# Patient Record
Sex: MALE | Race: WHITE | Employment: OTHER | URBAN - METROPOLITAN AREA
[De-identification: names, ages, dates, MRNs, and addresses within clinical notes are randomized per-mention and may not be internally consistent; named-entity substitution may affect disease eponyms.]

---

## 2017-11-14 RX ORDER — HYDROXYZINE HYDROCHLORIDE 10 MG/1
10 TABLET, FILM COATED ORAL
COMMUNITY
End: 2018-03-13

## 2017-11-15 ENCOUNTER — HOSPITAL ENCOUNTER (OUTPATIENT)
Dept: CARDIAC CATH/INVASIVE PROCEDURES | Age: 82
Discharge: HOME | End: 2017-11-15
Attending: INTERNAL MEDICINE | Admitting: INTERNAL MEDICINE
Payer: MEDICARE

## 2017-11-15 VITALS
RESPIRATION RATE: 24 BRPM | SYSTOLIC BLOOD PRESSURE: 148 MMHG | DIASTOLIC BLOOD PRESSURE: 74 MMHG | WEIGHT: 252.06 LBS | HEART RATE: 69 BPM | HEIGHT: 71 IN | TEMPERATURE: 98.5 F | BODY MASS INDEX: 35.29 KG/M2 | OXYGEN SATURATION: 94 %

## 2017-11-15 PROCEDURE — 74011000250 HC RX REV CODE- 250

## 2017-11-15 PROCEDURE — 74011250636 HC RX REV CODE- 250/636

## 2017-11-15 PROCEDURE — 74011250636 HC RX REV CODE- 250/636: Performed by: INTERNAL MEDICINE

## 2017-11-15 PROCEDURE — 77030013797 CARDIAC CATHETERIZATION

## 2017-11-15 PROCEDURE — 74011636320 HC RX REV CODE- 636/320: Performed by: INTERNAL MEDICINE

## 2017-11-15 RX ORDER — METOPROLOL SUCCINATE 25 MG/1
50 TABLET, EXTENDED RELEASE ORAL DAILY
Status: DISCONTINUED | OUTPATIENT
Start: 2017-11-15 | End: 2017-11-15 | Stop reason: HOSPADM

## 2017-11-15 RX ORDER — METOPROLOL SUCCINATE 50 MG/1
50 TABLET, EXTENDED RELEASE ORAL DAILY
Qty: 30 TAB | Refills: 6 | Status: SHIPPED | OUTPATIENT
Start: 2017-11-15 | End: 2018-03-13

## 2017-11-15 RX ORDER — MIDAZOLAM HYDROCHLORIDE 1 MG/ML
.5-2 INJECTION, SOLUTION INTRAMUSCULAR; INTRAVENOUS
Status: DISCONTINUED | OUTPATIENT
Start: 2017-11-15 | End: 2017-11-15 | Stop reason: HOSPADM

## 2017-11-15 RX ORDER — NITROGLYCERIN 0.4 MG/1
0.4 TABLET SUBLINGUAL
Status: DISCONTINUED | OUTPATIENT
Start: 2017-11-15 | End: 2017-11-15 | Stop reason: HOSPADM

## 2017-11-15 RX ORDER — SODIUM CHLORIDE 0.9 % (FLUSH) 0.9 %
5-10 SYRINGE (ML) INJECTION AS NEEDED
Status: DISCONTINUED | OUTPATIENT
Start: 2017-11-15 | End: 2017-11-15 | Stop reason: HOSPADM

## 2017-11-15 RX ORDER — SIMVASTATIN 20 MG/1
20 TABLET, FILM COATED ORAL
Status: DISCONTINUED | OUTPATIENT
Start: 2017-11-15 | End: 2017-11-15 | Stop reason: HOSPADM

## 2017-11-15 RX ORDER — MIDAZOLAM HYDROCHLORIDE 1 MG/ML
INJECTION, SOLUTION INTRAMUSCULAR; INTRAVENOUS
Status: COMPLETED
Start: 2017-11-15 | End: 2017-11-15

## 2017-11-15 RX ORDER — SOLIFENACIN SUCCINATE 5 MG/1
10 TABLET, FILM COATED ORAL DAILY
Status: DISCONTINUED | OUTPATIENT
Start: 2017-11-16 | End: 2017-11-15 | Stop reason: HOSPADM

## 2017-11-15 RX ORDER — LIDOCAINE HYDROCHLORIDE 10 MG/ML
INJECTION INFILTRATION; PERINEURAL
Status: COMPLETED
Start: 2017-11-15 | End: 2017-11-15

## 2017-11-15 RX ORDER — IBUPROFEN 200 MG
400 TABLET ORAL
Status: ON HOLD | COMMUNITY
End: 2018-01-10

## 2017-11-15 RX ORDER — FENTANYL CITRATE 50 UG/ML
INJECTION, SOLUTION INTRAMUSCULAR; INTRAVENOUS
Status: COMPLETED
Start: 2017-11-15 | End: 2017-11-15

## 2017-11-15 RX ORDER — FENTANYL CITRATE 50 UG/ML
25-100 INJECTION, SOLUTION INTRAMUSCULAR; INTRAVENOUS
Status: DISCONTINUED | OUTPATIENT
Start: 2017-11-15 | End: 2017-11-15 | Stop reason: HOSPADM

## 2017-11-15 RX ORDER — HYDROXYZINE HYDROCHLORIDE 10 MG/1
10 TABLET, FILM COATED ORAL
Status: DISCONTINUED | OUTPATIENT
Start: 2017-11-15 | End: 2017-11-15 | Stop reason: HOSPADM

## 2017-11-15 RX ORDER — HEPARIN SODIUM 200 [USP'U]/100ML
500 INJECTION, SOLUTION INTRAVENOUS
Status: DISCONTINUED | OUTPATIENT
Start: 2017-11-15 | End: 2017-11-15 | Stop reason: HOSPADM

## 2017-11-15 RX ORDER — HEPARIN SODIUM 200 [USP'U]/100ML
INJECTION, SOLUTION INTRAVENOUS
Status: COMPLETED
Start: 2017-11-15 | End: 2017-11-15

## 2017-11-15 RX ORDER — IBUPROFEN 400 MG/1
400 TABLET ORAL
Status: DISCONTINUED | OUTPATIENT
Start: 2017-11-15 | End: 2017-11-15 | Stop reason: HOSPADM

## 2017-11-15 RX ORDER — TELMISARTAN 40 MG/1
20 TABLET ORAL DAILY
Status: DISCONTINUED | OUTPATIENT
Start: 2017-11-16 | End: 2017-11-15 | Stop reason: HOSPADM

## 2017-11-15 RX ORDER — METOPROLOL SUCCINATE 50 MG/1
50 TABLET, EXTENDED RELEASE ORAL DAILY
Qty: 30 TAB | Refills: 6 | Status: ON HOLD | OUTPATIENT
Start: 2017-11-15 | End: 2017-11-29

## 2017-11-15 RX ORDER — ACETAMINOPHEN 325 MG/1
650 TABLET ORAL
Status: DISCONTINUED | OUTPATIENT
Start: 2017-11-15 | End: 2017-11-15 | Stop reason: HOSPADM

## 2017-11-15 RX ORDER — LIDOCAINE HYDROCHLORIDE 10 MG/ML
3-30 INJECTION INFILTRATION; PERINEURAL
Status: DISCONTINUED | OUTPATIENT
Start: 2017-11-15 | End: 2017-11-15 | Stop reason: HOSPADM

## 2017-11-15 RX ORDER — SODIUM CHLORIDE 9 MG/ML
50 INJECTION, SOLUTION INTRAVENOUS CONTINUOUS
Status: DISCONTINUED | OUTPATIENT
Start: 2017-11-15 | End: 2017-11-15 | Stop reason: HOSPADM

## 2017-11-15 RX ORDER — SODIUM CHLORIDE 0.9 % (FLUSH) 0.9 %
5-10 SYRINGE (ML) INJECTION EVERY 8 HOURS
Status: DISCONTINUED | OUTPATIENT
Start: 2017-11-15 | End: 2017-11-15 | Stop reason: HOSPADM

## 2017-11-15 RX ORDER — METOPROLOL SUCCINATE 50 MG/1
50 TABLET, EXTENDED RELEASE ORAL DAILY
Qty: 30 TAB | Refills: 6 | Status: SHIPPED | OUTPATIENT
Start: 2017-11-15 | End: 2017-11-15

## 2017-11-15 RX ADMIN — LIDOCAINE HYDROCHLORIDE 14 ML: 10 INJECTION, SOLUTION INFILTRATION; PERINEURAL at 11:39

## 2017-11-15 RX ADMIN — FENTANYL CITRATE 50 MCG: 50 INJECTION, SOLUTION INTRAMUSCULAR; INTRAVENOUS at 11:38

## 2017-11-15 RX ADMIN — LIDOCAINE HYDROCHLORIDE 14 ML: 10 INJECTION INFILTRATION; PERINEURAL at 11:39

## 2017-11-15 RX ADMIN — HEPARIN SODIUM 1000 UNITS: 200 INJECTION, SOLUTION INTRAVENOUS at 11:29

## 2017-11-15 RX ADMIN — IOPAMIDOL 120 ML: 612 INJECTION, SOLUTION INTRAVENOUS at 12:01

## 2017-11-15 RX ADMIN — MIDAZOLAM HYDROCHLORIDE 1 MG: 1 INJECTION, SOLUTION INTRAMUSCULAR; INTRAVENOUS at 11:38

## 2017-11-15 RX ADMIN — SODIUM CHLORIDE 50 ML/HR: 9 INJECTION, SOLUTION INTRAVENOUS at 10:42

## 2017-11-15 NOTE — ROUTINE PROCESS
TRANSFER - OUT REPORT:  Verbal report given to 58Khushboo Jai Arreola RN(name) on Mary Funk being transferred to Care(unit) for routine progression of care   Report consisted of patients Situation, Background, Assessment and   Recommendations(SBAR). Information from the following report(s) Kardex, Procedure Summary, Recent Results, Med Rec Status and Cardiac Rhythm NSR was reviewed with the receiving nurse.     Cath Lab Report:    Procedure:  FerrenZanCanavan ] LHC  [ ] RHC  [ ] PTCA   [ ] Peripheral   [ ] Pacemaker [ ] LIAM  [ ] [facility]    Access site:   [ ] Radial     [ ] Brachial     Ferren.Canavan ] Femoral    [ ] Jugular   [ ] Chest Wall       Sheath:           FerrenZanCanavan ] Pulled in Cath Lab   [ ] In place   [ ] To be pulled after:         Closure:          [ ] Burma Arnoldo [ ] Radial Band  [ ] Right [ ] Left    [ ] Manual Pressure     FerrenZanCanavan ] [place]in[place] Anton     [ ] Star Close    [ ] Per Close    [ ] Safe Guard    Site Assessment:   Katen ] Clean, Dry, No bleeding    [ ] Minor oozing          [ ] Hematoma: Description:    Stents(s) Placement:  [ ] Left Main:                 [ ] LAD:                [ ] Circ:                [ ] RCA:                [ ] EF:     [ ] Peripheral:      Denisen.Canavan ] N/A        Intra procedure Medications:    Fentanyl:50 mcg  Versed: 1 mg  Lines:        Peripheral IV 11/15/17 Right Forearm (Active)   Site Assessment Clean, dry, & intact 11/15/2017 10:42 AM   Phlebitis Assessment 0 11/15/2017 10:42 AM   Infiltration Assessment 0 11/15/2017 10:42 AM   Dressing Status Clean, dry, & intact 11/15/2017 10:42 AM          Patient Vitals for the past 4 hrs:   Temp Pulse Resp BP SpO2   11/15/17 1026 97.7 °F (36.5 °C) (!) 57 16 145/68 94 %         Extended / Orthostatic Vitals:    Vital Signs  Level of Consciousness: Alert (11/15/17 1026)  Temp: 97.7 °F (36.5 °C) (11/15/17 1026)  Temp Source: Oral (11/15/17 1026)  Pulse (Heart Rate): (!) 57 (11/15/17 1026)  Heart Rate Source: Monitor (11/15/17 1026)  Cardiac Rhythm: Normal sinus rhythm (11/15/17 1026)  Resp Rate: 16 (11/15/17 1026)  BP: 145/68 (11/15/17 1026)  MAP (Calculated): 94 (11/15/17 1026)  BP 1 Location: Right arm (11/15/17 1026)  BP 1 Method: Automatic (11/15/17 1026)  BP Patient Position: At rest (11/15/17 1026)  MEWS Score: 1 (11/15/17 1026)         Oxygen Therapy  O2 Sat (%): 94 % (11/15/17 1026)  O2 Device: Room air (11/15/17 1026)          Opportunity for questions and clarification was provided.

## 2017-11-15 NOTE — PROGRESS NOTES
Pt arrived to care unit S/P cardiac cath. Rt femoral accessed with Tegaderm dressing in place. No signs of bleeding nor hematoma.

## 2017-11-15 NOTE — PROGRESS NOTES
Pt discharged via amb in care of daughters, pt aaox3 at time of discharge, dressing to right groin area clean, dry and intact. Pedal pulses clean, dry and intact.  Arm bands removed and shredded

## 2017-11-15 NOTE — IP AVS SNAPSHOT
Jean Lala 
 
 
 [address] [facility] [address] 
[phone] Patient: Skye Rosario MRN: [id number] HIE:37/30/[id number] About your hospitalization You were admitted on:  November 15, 2017 You last received care in the:  [address] You were discharged on:  November 15, 2017 Why you were hospitalized Your primary diagnosis was:  Not on File Things You Need To Do (next 8 weeks) Follow up with Leonides Morton MD  
as scheduled Phone:  [phone] Where:  [address], [address] [facility] [address] [id number] Follow up with Taylor Altamirano NP Phone:  [phone] Where:  [address], [address] [facility] [address] Discharge Orders None A check elisabeth indicates which time of day the medication should be taken. My Medications TAKE these medications as instructed Instructions Each Dose to Equal  
 Morning Noon Evening Bedtime  
 hydrOXYzine HCl 10 mg tablet Commonly known as:  ATARAX Your last dose was: Your next dose is: Take 10 mg by mouth nightly. 10 mg  
    
   
   
   
  
 ibuprofen 200 mg tablet Commonly known as:  MOTRIN Your last dose was: Your next dose is: Take 400 mg by mouth every six (6) hours as needed for Pain. 400 mg  
    
   
   
   
  
 * metoprolol succinate 50 mg XL tablet Commonly known as:  TOPROL XL Your last dose was: Your next dose is: Take 1 Tab by mouth daily. 50 mg  
    
   
   
   
  
 * metoprolol succinate 50 mg XL tablet Commonly known as:  TOPROL-XL Your last dose was: Your next dose is: Take 1 Tab by mouth daily. 50 mg  
    
   
   
   
  
 MICARDIS 20 mg tablet Generic drug:  telmisartan Your last dose was: Your next dose is: Take  by mouth daily. MYRBETRIQ 25 mg ER tablet Generic drug:  mirabegron ER Your last dose was: Your next dose is: Take 50 mg by mouth daily. 50 mg  
    
   
   
   
  
 VESIcare 10 mg tablet Generic drug:  solifenacin Your last dose was: Your next dose is: TAKE 1 TABLET DAILY ZOCOR 20 mg tablet Generic drug:  simvastatin Your last dose was: Your next dose is: Take  by mouth nightly. * Notice: This list has 2 medication(s) that are the same as other medications prescribed for you. Read the directions carefully, and ask your doctor or other care provider to review them with you. Where to Get Your Medications These medications were sent to [address], [address] [place] [address] Phone:  [phone]  
  metoprolol succinate 50 mg XL tablet  
 metoprolol succinate 50 mg XL tablet Discharge Instructions HEART CATHETERIZATION/ANGIOGRAPHY DISCHARGE INSTRUCTIONS 1. Check puncture site frequently for swelling or bleeding. If there is any bleeding, lie down and apply pressure over the area with a clean towel or washcloth. Notify your doctor for any redness, swelling, drainage, or oozing from the puncture site. Notify your doctor for any fever or chills. If the extremity becomes cold, numb, or painful go to the emergency room DISCHARGE SUMMARY from Nurse PATIENT INSTRUCTIONS: 
 
After general anesthesia or intravenous sedation, for 24 hours or while taking prescription Narcotics: 
Limit your activities Do not drive and operate hazardous machinery Do not make important personal or business decisions Do  not drink alcoholic beverages If you have not urinated within 8 hours after discharge, please contact your surgeon on call. Report the following to your surgeon: Excessive pain, swelling, redness or odor of or around the surgical area Temperature over 100.5 Nausea and vomiting lasting longer than 4 hours or if unable to take medications Any signs of decreased circulation or nerve impairment to extremity: change in color, persistent  numbness, tingling, coldness or increase pain Any questions What to do at Home: 
Recommended activity: Activity as tolerated and no driving for today, *  Please give a list of your current medications to your Primary Care Provider. *  Please update this list whenever your medications are discontinued, doses are 
    changed, or new medications (including over-the-counter products) are added. *  Please carry medication information at all times in case of emergency situations. These are general instructions for a healthy lifestyle: No smoking/ No tobacco products/ Avoid exposure to second hand smoke Surgeon General's Warning:  Quitting smoking now greatly reduces serious risk to your health. Obesity, smoking, and sedentary lifestyle greatly increases your risk for illness A healthy diet, regular physical exercise & weight monitoring are important for maintaining a healthy lifestyle You may be retaining fluid if you have a history of heart failure or if you experience any of the following symptoms:  Weight gain of 3 pounds or more overnight or 5 pounds in a week, increased swelling in our hands or feet or shortness of breath while lying flat in bed. Please call your doctor as soon as you notice any of these symptoms; do not wait until your next office visit. Recognize signs and symptoms of STROKE: 
 
F-face looks uneven A-arms unable to move or move unevenly S-speech slurred or non-existent T-time-call 911 as soon as signs and symptoms begin-DO NOT go Back to bed or wait to see if you get better-TIME IS BRAIN. Warning Signs of HEART ATTACK Call 911 if you have these symptoms: Chest discomfort. Most heart attacks involve discomfort in the center of the chest that lasts more than a few minutes, or that goes away and comes back. It can feel like uncomfortable pressure, squeezing, fullness, or pain. Discomfort in other areas of the upper body. Symptoms can include pain or discomfort in one or both arms, the back, neck, jaw, or stomach. Shortness of breath with or without chest discomfort. Other signs may include breaking out in a cold sweat, nausea, or lightheadedness. Don't wait more than five minutes to call [address]! Fast action can save your life. Calling 911 is almost always the fastest way to get lifesaving treatment. Emergency Medical Services staff can begin treatment when they arrive  up to an hour sooner than if someone gets to the hospital by car. The discharge information has been reviewed with the patient. The patient verbalized understanding. Discharge medications reviewed with the patient and appropriate educational materials and side effects teaching were provided. 2. ___________________________________________________________________________________________________________________________________ 3. Activity should be limited for the next 48 hours. Climb stairs as little as possible and avoid any stooping, bending, or strenuous activity for 48 hours. No heavy lifting (anything over 10 pounds) for 3 days. 4. You may resume your usual diet. Drink more fluids than usual. 
5. Have a responsible person drive you home and stay with you for at least 24 hours after your heart catheterization/angiography. 6. You may remove bandage from your {ARM/GROIN:63315} in 24 hours. You may shower in 24 hours. No tub baths, hot tubs, or swimming for 1 week. Do not place any lotions, creams, powders, or ointments over puncture site for 1 week. You may place a clean band-aid over the puncture site each day for 5 days. Change daily. I have read the above instructions and have had the opportunity to ask questions. Patient: ________________________   Date: 11/15/2017 Witness: _______________________   Date: 11/15/2017 Introducing [facility]! Dear Kita Lopez: Thank you for requesting a Social Touch account. Our records indicate that you already have an active Social Touch account. You can access your account anytime at [email or web address] Did you know that you can access your hospital and ER discharge instructions at any time in Social Touch? You can also review all of your test results from your hospital stay or ER visit. Additional Information If you have questions, please visit the Frequently Asked Questions section of the Social Touch website at [email or web address] Remember, Social Touch is NOT to be used for urgent needs. For medical emergencies, dial 911. Now available from your iPhone and Android! Providers Seen During Your Hospitalization Provider Specialty Primary office phone Luis A Martino MD Cardiology [phone] Your Primary Care Physician (PCP) Primary Care Physician Office Phone Office Fax Dereck Dailey [phone] You are allergic to the following No active allergies Recent Documentation Height Weight BMI Smoking Status 1.791 m 114.3 kg 35.66 kg/m2 Former Smoker Emergency Contacts Name Discharge Info Relation Home Work Mobile ChavarriaNahum DISCHARGE CAREGIVER [3] Spouse [3] [phone] [address] CAREGIVER [3] Daughter [21] [phone] Patient Belongings The following personal items are in your possession at time of discharge: 
     Visual Aid: Glasses   Hearing Aids/Status: With patient Please provide this summary of care documentation to your next provider. Signatures-by signing, you are acknowledging that this After Visit Summary has been reviewed with you and you have received a copy. Patient Signature:  ____________________________________________________________ Date:  ____________________________________________________________  
  
Laila Artist Provider Signature:  ____________________________________________________________ Date:  ____________________________________________________________

## 2017-11-15 NOTE — H&P
Date of Surgery Update:  Master Eye was seen and examined. History and physical has been reviewed. The patient has been examined. There have been no significant clinical changes since the completion of the originally dated History and Physical.  Plan cath with moderate sedation.     Signed By: Jcarlos Banks MD     November 15, 2017 11:31 AM

## 2017-11-15 NOTE — ROUTINE PROCESS
Cardiac Cath Lab:  Pre Procedure Chart Check     Patients chart was accessed and reviewed for possible and/or scheduled procedure. Creatinine Clearance:  Creatinine clearance 105.871 ml/min  Total Contrast  Load:  3 x estimated clearance amount=  317.613   ml    75% of Contrast Load:  0.75 x Total Contrast Load=      238.2  ml    No results for input(s): WBC, RBC, HCT, HGB, PLT, INR, APTT, PTP, NA, K, BUN, CREA, GFRAA, GFRNA, CA, MAG, CPK, CKMB, CKNDX, CKND1, TROPT, TROIQ, BNPP, BNP, HCTEXT, HGBEXT, PLTEXT in the last 72 hours. No lab exists for component: DDIMER, GLUCOSE, INREXT    BMI: Body mass index is 35.66 kg/(m^2).     ALLERGIES: No Known Allergies    Lines:        Peripheral IV 11/15/17 Right Forearm (Active)   Site Assessment Clean, dry, & intact 11/15/2017 10:42 AM   Phlebitis Assessment 0 11/15/2017 10:42 AM   Infiltration Assessment 0 11/15/2017 10:42 AM   Dressing Status Clean, dry, & intact 11/15/2017 10:42 AM          History:    Past Medical History:   Diagnosis Date    CAP (community acquired pneumonia)     DI (detrusor instability)     Erectile dysfunction     [facility] [address]     Urgency of urination     Urinary frequency      Past Surgical History:   Procedure Laterality Date    HX CHOLECYSTECTOMY      HX HERNIA REPAIR      HX KNEE REPLACEMENT      bi-late    HX SHOULDER REPLACEMENT       Patient Active Problem List   Diagnosis Code    Malignant neoplasm of prostate ([facility] [address] C61

## 2017-11-15 NOTE — PROCEDURES
Brief Cardiac Catheterization Procedure Note    Patient: Angélica Walsh MRN: [id number]  SSN: [id number]    YOB: 1935  Age: 80 y.o. Sex: male      Date of Procedure: 11/15/2017     Pre-procedure Diagnosis:  Coronary Artery Disease, hypertension    Post-procedure Diagnosis: Coronary Artery Disease, hypertension    Procedure: Left Heart Catheterization, bilateral renal angiograms    Performed By: Dulce Matthews MD     Anesthesia: Moderate Sedation    Estimated Blood Loss: Less than 10 mL      Specimens: None    Findings: Severe 3 vessel CAD. Complications: None    Implants: None    Recommendations: Continue medical therapy. Cardiac surgery consult.      Signed By: Dulce Matthews MD     November 15, 2017

## 2017-11-15 NOTE — DISCHARGE INSTRUCTIONS
HEART CATHETERIZATION/ANGIOGRAPHY DISCHARGE INSTRUCTIONS    1. Check puncture site frequently for swelling or bleeding. If there is any bleeding, lie down and apply pressure over the area with a clean towel or washcloth. Notify your doctor for any redness, swelling, drainage, or oozing from the puncture site. Notify your doctor for any fever or chills. If the extremity becomes cold, numb, or painful go to the emergency room  DISCHARGE SUMMARY from Nurse    PATIENT INSTRUCTIONS:    After general anesthesia or intravenous sedation, for 24 hours or while taking prescription Narcotics:  Limit your activities  Do not drive and operate hazardous machinery  Do not make important personal or business decisions  Do  not drink alcoholic beverages  If you have not urinated within 8 hours after discharge, please contact your surgeon on call. Report the following to your surgeon:  Excessive pain, swelling, redness or odor of or around the surgical area  Temperature over 100.5  Nausea and vomiting lasting longer than 4 hours or if unable to take medications  Any signs of decreased circulation or nerve impairment to extremity: change in color, persistent  numbness, tingling, coldness or increase pain  Any questions    What to do at Home:  Recommended activity: Activity as tolerated and no driving for today,     *  Please give a list of your current medications to your Primary Care Provider. *  Please update this list whenever your medications are discontinued, doses are      changed, or new medications (including over-the-counter products) are added. *  Please carry medication information at all times in case of emergency situations. These are general instructions for a healthy lifestyle:    No smoking/ No tobacco products/ Avoid exposure to second hand smoke  Surgeon General's Warning:  Quitting smoking now greatly reduces serious risk to your health.     Obesity, smoking, and sedentary lifestyle greatly increases your risk for illness    A healthy diet, regular physical exercise & weight monitoring are important for maintaining a healthy lifestyle    You may be retaining fluid if you have a history of heart failure or if you experience any of the following symptoms:  Weight gain of 3 pounds or more overnight or 5 pounds in a week, increased swelling in our hands or feet or shortness of breath while lying flat in bed. Please call your doctor as soon as you notice any of these symptoms; do not wait until your next office visit. Recognize signs and symptoms of STROKE:    F-face looks uneven    A-arms unable to move or move unevenly    S-speech slurred or non-existent    T-time-call 911 as soon as signs and symptoms begin-DO NOT go       Back to bed or wait to see if you get better-TIME IS BRAIN. Warning Signs of HEART ATTACK     Call 911 if you have these symptoms:  Chest discomfort. Most heart attacks involve discomfort in the center of the chest that lasts more than a few minutes, or that goes away and comes back. It can feel like uncomfortable pressure, squeezing, fullness, or pain. Discomfort in other areas of the upper body. Symptoms can include pain or discomfort in one or both arms, the back, neck, jaw, or stomach. Shortness of breath with or without chest discomfort. Other signs may include breaking out in a cold sweat, nausea, or lightheadedness. Don't wait more than five minutes to call 911 - MINUTES MATTER! Fast action can save your life. Calling 911 is almost always the fastest way to get lifesaving treatment. Emergency Medical Services staff can begin treatment when they arrive -- up to an hour sooner than if someone gets to the hospital by car. The discharge information has been reviewed with the patient. The patient verbalized understanding.   Discharge medications reviewed with the patient and appropriate educational materials and side effects teaching were provided. 2. ___________________________________________________________________________________________________________________________________  3. Activity should be limited for the next 48 hours. Climb stairs as little as possible and avoid any stooping, bending, or strenuous activity for 48 hours. No heavy lifting (anything over 10 pounds) for 3 days. 4. You may resume your usual diet. Drink more fluids than usual.  5. Have a responsible person drive you home and stay with you for at least 24 hours after your heart catheterization/angiography. 6. You may remove bandage from your {ARM/GROIN:35527} in 24 hours. You may shower in 24 hours. No tub baths, hot tubs, or swimming for 1 week. Do not place any lotions, creams, powders, or ointments over puncture site for 1 week. You may place a clean band-aid over the puncture site each day for 5 days. Change daily. I have read the above instructions and have had the opportunity to ask questions.       Patient: ________________________   Date: 11/15/2017    Witness: _______________________   Date: 11/15/2017

## 2017-11-16 ENCOUNTER — TELEPHONE (OUTPATIENT)
Dept: CARDIOTHORACIC SURGERY | Age: 82
End: 2017-11-16

## 2017-11-16 NOTE — TELEPHONE ENCOUNTER
Called and spoke to patient to set up a consult with Dr. Kane Fontaine . Referred from Dr. Kylah Alvarez  He is ok with next Wed.  At 11:00 AM

## 2017-11-17 ENCOUNTER — TELEPHONE (OUTPATIENT)
Dept: CARDIOTHORACIC SURGERY | Age: 82
End: 2017-11-17

## 2017-11-17 NOTE — TELEPHONE ENCOUNTER
S/w regarding pt's scheduled appt for Monday up at [facility]  @ 11:00 AM.  Pt is aware of date/time/location of appt.

## 2017-11-18 NOTE — PROCEDURES
[address] [facility] CATH LAB    Name:  Kaitlin Tapia  MR#:  [id number]  :  1935  Account #:  [de-identified]  Date of Adm:  11/15/2017  Date of Service:  11/15/2017      PROCEDURES PERFORMED  1. Cardiac catheterization. 2. Renal angiogram.    PREOPERATIVE DIAGNOSIS: Coronary artery disease. POSTOPERATIVE DIAGNOSIS: Coronary artery disease. BRIEF CLINICAL HISTORY: The patient is a 44-year-old man with  multiple risk factors for coronary artery disease. He has had some  exertional dyspnea and chest tightness. Therefore, it was felt that he  should have cardiac catheterization and coronary angiography. SPECIMENS REMOVED: None. ESTIMATED BLOOD LOSS: Minimal.    TECHNIQUE: The patient was brought to catheterization lab, prepped  and placed on the table in the usual fashion. The right groin was  prepped and draped. After lidocaine local anesthesia, a 6-[place]  sheath was placed in the right femoral artery using micropuncture  technique. Next, the right coronary catheter was introduced and right  coronary angiograms were obtained. Right catheter was exchanged for  a left coronary catheter. Left coronary angiograms were obtained. Next, the right catheter was reintroduced and bilateral selective renal  angiograms were performed. Next, the right femoral angiogram was  performed through the sheath. The artery was sufficiently large to use  a closure device. The patient was then reprepped, redraped and we  changed gloves. Angio-Seal was deployed. Hemostasis was assured. A dry sterile dressing was applied. The patient returned to recovery  room in satisfactory condition, having tolerated the procedure well. There were no complications. RESULTS  HEMODYNAMICS: ANGIOGRAPHY    1. RIGHT CORONARY ARTERY: Right coronary artery is dominant  and supplies the posterior descending coronary artery.  Right common  iliac artery is ectatic and diffusely diseased throughout its course. There is a subtotal occlusion at the crux affecting the posterolateral  branches. The PDA has diffuse disease in it. 2. LEFT CORONARY ARTERY: The left main coronary artery is  patent. The left anterior descending has a severe stenosis in its  proximal aspect compromised by the lumen, but looks like 90%. Also,  the diagonal branch has a severe stenosis estimated to be about 90%. There is a ramus intermedius branch which is a long subtotal  occlusion. The circumflex also has significant disease with in a long  segment of occlusion in posterolateral vessels and first OM, which is  small and appears to have a subtotal occlusion as well. 3. RIGHT RENAL ARTERY: Right renal artery is patent. 4. LEFT RENAL ARTERY: There are 2 left renal arteries. There is a  small one to the upper pole of the left kidney and a lower one which  supplies rest of the left kidney. Renals are patent. CONCLUSIONS  1. Severe triple-vessel coronary disease. 2. Patent renal arteries. RECOMMENDATIONS: Continue medical therapy. Surgical  consultation for revascularization.         MD Tremaine Montalvo / Sarah Page  D:  11/17/2017   12:36  T:  11/17/2017   20:30  Job #:  [id number]

## 2017-11-19 NOTE — PROGRESS NOTES
CARDIOTHORACIC SURGERY CONSULTATION NOTE    11/19/2017  4:53 PM    I am seeing this patient for the first time in consultation at the request of Dr. Maximo Parks. I have also reviewed his records and pertinent studies, and have obtained additional information on the patient's history from the patient's daughterwho was present during the evaluation. Landon Whitt is a [de-identified] y.o. male who presents with dyspnea. This has been present for 5 - 6 years, and is increasing in severity recently. He is able to climb a flight of stairs, but slowly. He also experiences pain between his shoulder blades. The pain is moderate and is without radiation. It is not associated with exertion, emotional stress, or eating. It disappears when he takes ibuprofen. He has also been experiencing dizziness, but denies chest pain, chest pressure/discomfort, claudication, fatigue, lower extremity edema, near-syncope, orthopnea, palpitations, paroxysmal nocturnal dyspnea, syncope, tachypnea. A stress test was apparently negative, according to him (I do not have those results in front of me at this time). Cardiac risk factors include dyslipidemia, hypertension and a family history of CAD  There is no recent history of smoking of diabetes. Past Medical History:   Diagnosis Date    CAP (community acquired pneumonia)     DI (detrusor instability)     Erectile dysfunction     Prostate cancer ([facility] [address]     Urgency of urination     Urinary frequency        The past medical history is also notable for gout, varicose veins in his both legs, arthritis, pneumonia, and mitral valve prolapse. Past Surgical History:   Procedure Laterality Date    HX CHOLECYSTECTOMY      HX HERNIA REPAIR      HX KNEE REPLACEMENT      bi-late    HX SHOULDER REPLACEMENT         The past surgical history is also notable for vein stripping from the left leg.     Present medications include   Current Outpatient Prescriptions   Medication Sig Dispense Refill    ibuprofen (MOTRIN) 200 mg tablet Take 400 mg by mouth every six (6) hours as needed for Pain.  metoprolol succinate (TOPROL XL) 50 mg XL tablet Take 1 Tab by mouth daily. 30 Tab 6    metoprolol succinate (TOPROL-XL) 50 mg XL tablet Take 1 Tab by mouth daily. 30 Tab 6    mirabegron ER (MYRBETRIQ) 25 mg ER tablet Take 50 mg by mouth daily.  hydrOXYzine HCl (ATARAX) 10 mg tablet Take 10 mg by mouth nightly.  VESICARE 10 mg tablet TAKE 1 TABLET DAILY 90 Tab 0    simvastatin (ZOCOR) 20 mg tablet Take  by mouth nightly.  telmisartan (MICARDIS) 20 mg tablet Take  by mouth daily. Drug allergies: No Known Allergies    Family History: There is a history of heart disease in the family. Social History: Smoking history as above. He consumes alcohol socially. He lives with his spouse and is a retired .     REVIEW OF SYSTEMS:   Constitutional:        negative for significant weight gain or loss recently       negative for fevers, chills    Integumentary:       negative for recent rashes  Eyes:        negative for diplopia, transient visual loss  ENMT:        positive for hearing loss        negative for sinus congestion        negative for sore throat  Respiratory:        negative for chronic cough        negative for recent productive cough  Cardiovascular: as noted above in history   Gastrointestinal:        negative for abdominal pain        negative for diarrhea       negative for constipation  Genitourinary:        negative for dysuria  Musculoskeletal:        negative for chronic back pain        negative for joint pain        negative for recent fractures        negative for leg cramping  Hematologic / Lymphatic:        negative for easy bruisability        negative for clots in legs  Neurological:       negative for headaches***        negative for seizures  Psychiatric:        positive for anxiety        negative for depression    PHYSICAL EXAMINATION:  Vital signs: BP Readings from Last 3 Encounters:   11/15/17 148/74   02/10/17 120/60   02/08/16 128/70     @FBBY(70)@  Wt Readings from Last 3 Encounters:   11/15/17 114.3 kg (252 lb 1 oz)   02/10/17 115.2 kg (254 lb)   02/08/16 114.8 kg (253 lb)     Ht Readings from Last 3 Encounters:   11/15/17 5' 10.5\" (1.791 m)   02/10/17 5' 10\" (1.778 m)   02/08/16 5' 10\" (1.778 m)     General appearance:  He appeared well-nourished and of large build. Integument: The skin was warm and dry and had fair turgor. There were no lower extremity venous stasis changes. Head and Neck: The head was normocephalic and was atraumatic. The neck was supple with good range of motion. Thyromegaly was absent, and cervical and supraclavicular lymphadenopathy were absent. EENMT: Conjunctivae were not injected. The sclerae were anicteric, and the naris was not patent on the left. Oral mucosa were not moist.  The tongue was in the midline. Dentition was good. Back: CVA and vertebral tenderness were absent. He was not kyphotic. Lungs: Respirations were not labored, and the lungs were clear to auscultation bilaterally, without rales, without rhonchi, and without wheezes. Heart: The rate and rhythm were regular, without an S3, without JVD, and without a murmur. The PMI was not displaced laterally. Abdomen: The abdomen was globoid and was soft and was not tender, with good bowel sounds, and hepatomegaly was absent, and splenomegaly was absent. Pulsatile masses and bruits in the abdomen were absent. Vascular: Carotid pulses were 1+ bilaterally without bruits, and radial pulses were 2+ bilaterally. Pedal pulses were present bilaterally. Varicose veins were present in the right leg. Extremities: Clubbing was absent, cyanosis was absent, and pedal edema was absent. Neurologic: He was alert and oriented, and was a good historian. Strength and motion appeared normal and symmetrical in his extremities.    Psychiatric: He was pleasant, calm, and had a normal affect. LABORATORIES:   No results found for: WBC, HCT, PLT, HCTEXT, PLTEXT   No results found for: NA, K, CL, CO2, GLU, BUN, CREA    I have also personally reviewed the cardiac catheterization images. Coronary arteriography was noted to reveal the following atheromatous plaque stenoses in the native coronary arteries:   Proximal left anterior descending 95%  mid LAD 70%  1st diagonal 95%  proximal LCX subtotally occluded  distal RCA 60%  posterolateral branch of the RCA 95%. All of his coronary arteries were small in caliber and heavily diseased. Impression:  Diagnoses:  1. Coronary artery disease (progressive, severe)  2. Essential hypertension  3. hypercholesterolemia    His 3-vessel CAD is quite severe, with nonbypassable targets except perhaps for the LAD. Thus I doubt that he would have much potential benefit from surgical coronary revascularization given that the conduits would be unlikely to stay open, if we could even place them at all. I have discussed the operation in detail with him and his daughter, along with the alternatives, which would include medical therapy and / or PCI. I also outlined the risks and benefits of the surgery, which would include, but not be limited to, operative mortality, stroke, pneumonia, renal failure, infection, bleeding and need for re-exploration, perioperative myocardial infarction, postoperative arrhythmias, sternal dehiscence, hand dysesthesias and/or weakness, and blood component transfusions. Recommendations:  Given the poor quality of his targets, I think CABG is not an option and PCI needs to be considered. Thank you for involving me in his care.     Fan Baker MD                                  [place]  Physical Exam

## 2017-11-20 ENCOUNTER — OFFICE VISIT (OUTPATIENT)
Dept: CARDIOTHORACIC SURGERY | Age: 82
End: 2017-11-20

## 2017-11-20 VITALS — DIASTOLIC BLOOD PRESSURE: 64 MMHG | HEART RATE: 68 BPM | SYSTOLIC BLOOD PRESSURE: 120 MMHG | RESPIRATION RATE: 18 BRPM

## 2017-11-20 DIAGNOSIS — I25.118 CORONARY ARTERY DISEASE OF NATIVE ARTERY OF NATIVE HEART WITH STABLE ANGINA PECTORIS (HCC): Primary | ICD-10-CM

## 2017-11-20 NOTE — LETTER
2017 Patient:  Magdalena Hagen MRN:     [id number] :     1935 Dear Issa Lopez: 
 
I had the pleasure of seeing Magdalena Hagen in consultation today at [facility].  Thanks very much for involving me in his care. He is a [de-identified] y.o. male who presents with dyspnea. Cardiac catheterization revealed severe three-vessel coronary artery disease with poor targets due to small size and diffuse disease. I had a lengthy discussion with him and his daughter regarding treatment options. He will be seeing you tomorrow to discuss PCI vs medical therapy. I appreciate you referring him along to me. If you have any questions about the case, please feel free to contact me. Best regards, Eddie Tineo MD 
Medical Director, [facility] [place] DR. KYLE SANTIAGO

## 2017-11-20 NOTE — PROGRESS NOTES
CARDIOTHORACIC SURGERY CONSULTATION NOTE    11/19/2017  4:53 PM    I am seeing this patient for the first time in consultation at the request of Dr. Cheryl Meier. I have also reviewed his records and pertinent studies, and have obtained additional information on the patient's history from the patient's daughterwho was present during the evaluation. Liban Wiggins is a [de-identified] y.o. male who presents with dyspnea. This has been present for 5 - 6 years, and is increasing in severity recently. He is able to climb a flight of stairs, but slowly. He also experiences pain between his shoulder blades. The pain is moderate and is without radiation. It is not associated with exertion, emotional stress, or eating. It disappears when he takes ibuprofen. He has also been experiencing dizziness, but denies chest pain, chest pressure/discomfort, claudication, fatigue, lower extremity edema, near-syncope, orthopnea, palpitations, paroxysmal nocturnal dyspnea, syncope, tachypnea. A stress test was apparently negative, according to him (I do not have those results in front of me at this time). Cardiac risk factors include dyslipidemia, hypertension and a family history of CAD  There is no recent history of smoking of diabetes. Past Medical History:   Diagnosis Date    CAP (community acquired pneumonia)     DI (detrusor instability)     Erectile dysfunction     Prostate cancer ([facility] [address]     Urgency of urination     Urinary frequency        The past medical history is also notable for gout, varicose veins in his both legs, arthritis, pneumonia, and mitral valve prolapse. Past Surgical History:   Procedure Laterality Date    HX CHOLECYSTECTOMY      HX HERNIA REPAIR      HX KNEE REPLACEMENT      bi-late    HX SHOULDER REPLACEMENT         The past surgical history is also notable for vein stripping from the left leg.     Present medications include   Current Outpatient Prescriptions   Medication Sig Dispense Refill    ibuprofen (MOTRIN) 200 mg tablet Take 400 mg by mouth every six (6) hours as needed for Pain.  metoprolol succinate (TOPROL XL) 50 mg XL tablet Take 1 Tab by mouth daily. 30 Tab 6    metoprolol succinate (TOPROL-XL) 50 mg XL tablet Take 1 Tab by mouth daily. 30 Tab 6    mirabegron ER (MYRBETRIQ) 25 mg ER tablet Take 50 mg by mouth daily.  hydrOXYzine HCl (ATARAX) 10 mg tablet Take 10 mg by mouth nightly.  VESICARE 10 mg tablet TAKE 1 TABLET DAILY 90 Tab 0    simvastatin (ZOCOR) 20 mg tablet Take  by mouth nightly.  telmisartan (MICARDIS) 20 mg tablet Take  by mouth daily. Drug allergies: No Known Allergies    Family History: There is a history of heart disease in the family. Social History: Smoking history as above. He consumes alcohol socially. He lives with his spouse and is a retired .     REVIEW OF SYSTEMS:   Constitutional:        negative for significant weight gain or loss recently       negative for fevers, chills    Integumentary:       negative for recent rashes  Eyes:        negative for diplopia, transient visual loss  ENMT:        positive for hearing loss        negative for sinus congestion        negative for sore throat  Respiratory:        negative for chronic cough        negative for recent productive cough  Cardiovascular: as noted above in history   Gastrointestinal:        negative for abdominal pain        negative for diarrhea       negative for constipation  Genitourinary:        negative for dysuria  Musculoskeletal:        negative for chronic back pain        negative for joint pain        negative for recent fractures        negative for leg cramping  Hematologic / Lymphatic:        negative for easy bruisability        negative for clots in legs  Neurological:       negative for headaches        negative for seizures  Psychiatric:        positive for anxiety        negative for depression    PHYSICAL EXAMINATION:  Vital signs: BP Readings from Last 3 Encounters:   11/15/17 148/74   02/10/17 120/60   02/08/16 128/70     @BXAP(44)@  Wt Readings from Last 3 Encounters:   11/15/17 114.3 kg (252 lb 1 oz)   02/10/17 115.2 kg (254 lb)   02/08/16 114.8 kg (253 lb)     Ht Readings from Last 3 Encounters:   11/15/17 5' 10.5\" (1.791 m)   02/10/17 5' 10\" (1.778 m)   02/08/16 5' 10\" (1.778 m)     General appearance:  He appeared well-nourished and of large build. Integument: The skin was warm and dry and had fair turgor. There were no lower extremity venous stasis changes. Head and Neck: The head was normocephalic and was atraumatic. The neck was supple with good range of motion. Thyromegaly was absent, and cervical and supraclavicular lymphadenopathy were absent. EENMT: Conjunctivae were not injected. The sclerae were anicteric, and the naris was not patent on the left. Oral mucosa were not moist.  The tongue was in the midline. Dentition was good. Back: CVA and vertebral tenderness were absent. He was not kyphotic. Lungs: Respirations were not labored, and the lungs were clear to auscultation bilaterally, without rales, without rhonchi, and without wheezes. Heart: The rate and rhythm were regular, without an S3, without JVD, and without a murmur. The PMI was not displaced laterally. Abdomen: The abdomen was globoid and was soft and was not tender, with good bowel sounds, and hepatomegaly was absent, and splenomegaly was absent. Pulsatile masses and bruits in the abdomen were absent. Vascular: Carotid pulses were 1+ bilaterally without bruits, and radial pulses were 2+ bilaterally. Pedal pulses were present bilaterally. Varicose veins were present in the right leg. Extremities: Clubbing was absent, cyanosis was absent, and pedal edema was absent. Neurologic: He was alert and oriented, and was a good historian. Strength and motion appeared normal and symmetrical in his extremities.    Psychiatric: He was pleasant, calm, and had a normal affect. LABORATORIES:   No results found for: WBC, HCT, PLT, HCTEXT, PLTEXT   No results found for: NA, K, CL, CO2, GLU, BUN, CREA    I have also personally reviewed the cardiac catheterization images. Coronary arteriography was noted to reveal the following atheromatous plaque stenoses in the native coronary arteries:   Proximal left anterior descending 95%  mid LAD 70%  1st diagonal 95%  proximal LCX subtotally occluded  distal RCA 60%  posterolateral branch of the RCA 95%. All of his coronary arteries were small in caliber and heavily diseased. Impression:  Diagnoses:  1. Coronary artery disease (progressive, severe)  2. Essential hypertension  3. hypercholesterolemia    His 3-vessel CAD is quite severe, with nonbypassable targets except perhaps for the LAD. Thus I doubt that he would have much potential benefit from surgical coronary revascularization given that the conduits would be unlikely to stay open, if we could even place them at all. I have discussed the operation in detail with him and his daughter, along with the alternatives, which would include medical therapy and / or PCI. I also outlined the risks and benefits of the surgery, which would include, but not be limited to, operative mortality, stroke, pneumonia, renal failure, infection, bleeding and need for re-exploration, perioperative myocardial infarction, postoperative arrhythmias, sternal dehiscence, hand dysesthesias and/or weakness, and blood component transfusions. Recommendations:  Given the poor quality of his targets, I think CABG is not an option and PCI needs to be considered. Thank you for involving me in his care.     Manuel Fletcher MD

## 2017-11-28 RX ORDER — FENTANYL CITRATE 50 UG/ML
25-100 INJECTION, SOLUTION INTRAMUSCULAR; INTRAVENOUS
Status: CANCELLED | OUTPATIENT
Start: 2017-11-28

## 2017-11-28 RX ORDER — LIDOCAINE HYDROCHLORIDE 10 MG/ML
3-30 INJECTION INFILTRATION; PERINEURAL
Status: CANCELLED | OUTPATIENT
Start: 2017-11-28

## 2017-11-28 RX ORDER — HEPARIN SODIUM 200 [USP'U]/100ML
500 INJECTION, SOLUTION INTRAVENOUS
Status: CANCELLED | OUTPATIENT
Start: 2017-11-28

## 2017-11-28 RX ORDER — MIDAZOLAM HYDROCHLORIDE 1 MG/ML
.5-2 INJECTION, SOLUTION INTRAMUSCULAR; INTRAVENOUS
Status: CANCELLED | OUTPATIENT
Start: 2017-11-28

## 2017-11-29 ENCOUNTER — HOSPITAL ENCOUNTER (OUTPATIENT)
Dept: CARDIAC CATH/INVASIVE PROCEDURES | Age: 82
Discharge: HOME | End: 2017-11-29
Attending: INTERNAL MEDICINE | Admitting: INTERNAL MEDICINE
Payer: MEDICARE

## 2017-11-29 VITALS
HEART RATE: 45 BPM | TEMPERATURE: 98.2 F | WEIGHT: 247.13 LBS | HEIGHT: 71 IN | OXYGEN SATURATION: 92 % | BODY MASS INDEX: 34.6 KG/M2 | RESPIRATION RATE: 18 BRPM | DIASTOLIC BLOOD PRESSURE: 67 MMHG | SYSTOLIC BLOOD PRESSURE: 138 MMHG

## 2017-11-29 LAB
BASOPHILS # BLD: 0 K/UL (ref 0–0.06)
BASOPHILS NFR BLD: 1 % (ref 0–2)
DIFFERENTIAL METHOD BLD: ABNORMAL
EOSINOPHIL # BLD: 0.2 K/UL (ref 0–0.4)
EOSINOPHIL NFR BLD: 3 % (ref 0–5)
ERYTHROCYTE [DISTWIDTH] IN BLOOD BY AUTOMATED COUNT: 14.9 % (ref 11.6–14.5)
HCT VFR BLD AUTO: 41.3 % (ref 36–48)
HGB BLD-MCNC: 13.5 G/DL (ref 13–16)
LYMPHOCYTES # BLD: 2.2 K/UL (ref 0.9–3.6)
LYMPHOCYTES NFR BLD: 26 % (ref 21–52)
MCH RBC QN AUTO: 29.6 PG (ref 24–34)
MCHC RBC AUTO-ENTMCNC: 32.7 G/DL (ref 31–37)
MCV RBC AUTO: 90.6 FL (ref 74–97)
MONOCYTES # BLD: 0.7 K/UL (ref 0.05–1.2)
MONOCYTES NFR BLD: 8 % (ref 3–10)
NEUTS SEG # BLD: 5.4 K/UL (ref 1.8–8)
NEUTS SEG NFR BLD: 62 % (ref 40–73)
PLATELET # BLD AUTO: 221 K/UL (ref 135–420)
PMV BLD AUTO: 12 FL (ref 9.2–11.8)
RBC # BLD AUTO: 4.56 M/UL (ref 4.7–5.5)
WBC # BLD AUTO: 8.5 K/UL (ref 4.6–13.2)

## 2017-11-29 PROCEDURE — 36415 COLL VENOUS BLD VENIPUNCTURE: CPT | Performed by: INTERNAL MEDICINE

## 2017-11-29 PROCEDURE — 85025 COMPLETE CBC W/AUTO DIFF WBC: CPT | Performed by: INTERNAL MEDICINE

## 2017-11-29 RX ORDER — CLOPIDOGREL BISULFATE 75 MG/1
75 TABLET ORAL
COMMUNITY
End: 2019-03-18

## 2017-11-29 RX ORDER — SODIUM CHLORIDE 9 MG/ML
50 INJECTION, SOLUTION INTRAVENOUS CONTINUOUS
Status: DISCONTINUED | OUTPATIENT
Start: 2017-11-29 | End: 2017-11-29

## 2017-11-29 NOTE — PROGRESS NOTES
Pt came in to the care unit , c/o feeling sick, chest congestion and diarrhea for 2 days. Dr Janina Beverly notified, came to assess pt and decide to cancel the case for today. CBC ordered and drawn before discharge home. Pt left with Family, discharge to home.

## 2017-11-29 NOTE — DISCHARGE INSTRUCTIONS
Cardiac Catheterization/Angiography Discharge Instructions    *Check the puncture site frequently for swelling or bleeding. If you see any bleeding, lie down and apply pressure over the area with a clean town or washcloth. Notify your doctor for any redness, swelling, drainage or oozing from the puncture site. Notify your doctor for any fever or chills. *If the leg or arm with the puncture becomes cold, numb or painful, call Dr George Grounds   *Activity should be limited for the next 48 hours. Climb stairs as little as possible and avoid any stooping, bending or strenuous activity for 48 hours. No heavy lifting (anything over 10 pounds) for three days. *Do not drive for 48 hours. *You may resume your usual diet. Drink more fluids than usual.    *Have a responsible person drive you home and stay with you for at least 24 hours after your heart catheterization/angiography. *You may remove the bandage from your {ARM/GROIN:42747} in 24 hours. You may shower in 24 hours. No tub baths, hot tubs or swimming for one week. Do not place any lotions, creams, powders, ointments over the puncture site for one week. You may place a clean band-aid over the puncture site each day for 5 days. Change this daily. Sedation for a Medical Procedure: Care Instructions  Your Care Instructions    For a minor procedure or surgery, you will get a sedative to help you relax. This drug will make you sleepy. It is usually given in a vein (by IV). A shot may also be used to numb the area. If you had local anesthesia, you may feel some pain and discomfort as it wears off. If you have pain, don't be afraid to say so. Pain medicine works better if you take it before the pain gets bad. Common side effects from sedation include:  · Feeling sleepy. (Your doctors and nurses will make sure you are not too sleepy to go home.)  · Nausea and vomiting. This usually does not last long. · Feeling tired.   Follow-up care is a key part of your treatment and safety. Be sure to make and go to all appointments, and call your doctor if you are having problems. It's also a good idea to know your test results and keep a list of the medicines you take. How can you care for yourself at home? Activity  ? · Don't do anything for 24 hours that requires attention to detail. It takes time for the medicine effects to completely wear off.   ? · For your safety, you should not drive or operate any machinery that could be dangerous until the medicine wears off and you can think clearly and react easily. ? · Rest when you feel tired. Getting enough sleep will help you recover. Diet  ? · You can eat your normal diet, unless your doctor gives you other instructions. If your stomach is upset, try clear liquids and bland, low-fat foods like plain toast or rice. ? · Drink plenty of fluids (unless your doctor tells you not to). ? · Don't drink alcohol for 24 hours. Medicines  ? · Be safe with medicines. Read and follow all instructions on the label. ¨ If the doctor gave you a prescription medicine for pain, take it as prescribed. ¨ If you are not taking a prescription pain medicine, ask your doctor if you can take an over-the-counter medicine. ? · If you think your pain medicine is making you sick to your stomach:  ¨ Take your medicine after meals (unless your doctor has told you not to). ¨ Ask your doctor for a different pain medicine. When should you call for help? Call 911 anytime you think you may need emergency care. For example, call if:  ? · You have severe trouble breathing. ? · You passed out (lost consciousness). ?Call your doctor now or seek immediate medical care if:  ? · You have trouble breathing. ? · You have ongoing or worsening nausea or vomiting. ? · You have a fever. ? · You have a new or worse headache. ? · The medicine is not wearing off and you can't think clearly. ? Watch closely for changes in your health, and be sure to contact your doctor if:  ? · You do not get better as expected. Where can you learn more? Go to http://ann-jose.info/. Enter B381 in the search box to learn more about \"Sedation for a Medical Procedure: Care Instructions. \"  Current as of: August 14, 2016  Content Version: 11.4  © 1439-3706 [employer]. Care instructions adapted under license by [employer] (which disclaims liability or warranty for this information). If you have questions about a medical condition or this instruction, always ask your healthcare professional. [place] [address] any warranty or liability for your use of this information. DISCHARGE SUMMARY from Nurse    PATIENT INSTRUCTIONS:    After general anesthesia or intravenous sedation, for 24 hours or while taking prescription Narcotics:  · Limit your activities  · Do not drive and operate hazardous machinery  · Do not make important personal or business decisions  · Do  not drink alcoholic beverages  · If you have not urinated within 8 hours after discharge, please contact your surgeon on call. Report the following to your surgeon:  · Excessive pain, swelling, redness or odor of or around the surgical area  · Temperature over 100.5  · Nausea and vomiting lasting longer than 4 hours or if unable to take medications  · Any signs of decreased circulation or nerve impairment to extremity: change in color, persistent  numbness, tingling, coldness or increase pain  · Any questions    What to do at Home:  Recommended activity: Activity as tolerated,     *  Please give a list of your current medications to your Primary Care Provider. *  Please update this list whenever your medications are discontinued, doses are      changed, or new medications (including over-the-counter products) are added. *  Please carry medication information at all times in case of emergency situations.     These are general instructions for a healthy lifestyle:    No smoking/ No tobacco products/ Avoid exposure to second hand smoke  Surgeon General's Warning:  Quitting smoking now greatly reduces serious risk to your health. Obesity, smoking, and sedentary lifestyle greatly increases your risk for illness    A healthy diet, regular physical exercise & weight monitoring are important for maintaining a healthy lifestyle    You may be retaining fluid if you have a history of heart failure or if you experience any of the following symptoms:  Weight gain of 3 pounds or more overnight or 5 pounds in a week, increased swelling in our hands or feet or shortness of breath while lying flat in bed. Please call your doctor as soon as you notice any of these symptoms; do not wait until your next office visit. Recognize signs and symptoms of STROKE:    F-face looks uneven    A-arms unable to move or move unevenly    S-speech slurred or non-existent    T-time-call 911 as soon as signs and symptoms begin-DO NOT go       Back to bed or wait to see if you get better-TIME IS BRAIN. Warning Signs of HEART ATTACK     Call 911 if you have these symptoms:   Chest discomfort. Most heart attacks involve discomfort in the center of the chest that lasts more than a few minutes, or that goes away and comes back. It can feel like uncomfortable pressure, squeezing, fullness, or pain.  Discomfort in other areas of the upper body. Symptoms can include pain or discomfort in one or both arms, the back, neck, jaw, or stomach.  Shortness of breath with or without chest discomfort.  Other signs may include breaking out in a cold sweat, nausea, or lightheadedness. Don't wait more than five minutes to call 911 - MINUTES MATTER! Fast action can save your life. Calling 911 is almost always the fastest way to get lifesaving treatment.  Emergency Medical Services staff can begin treatment when they arrive -- up to an hour sooner than if someone gets to the [facility] by car. The discharge information has been reviewed with the patient and family. The patient and family verbalized understanding. Discharge medications reviewed with the patient and family and appropriate educational materials and side effects teaching were provided.       Patient armband removed and shredded    ___________________________________________________________________________________________________________________________________

## 2017-12-12 RX ORDER — ASPIRIN 81 MG/1
81 TABLET ORAL DAILY
COMMUNITY
End: 2018-10-19

## 2017-12-13 ENCOUNTER — HOSPITAL ENCOUNTER (INPATIENT)
Dept: CARDIAC CATH/INVASIVE PROCEDURES | Age: 82
LOS: 1 days | Discharge: HOME | DRG: 247 | End: 2017-12-15
Attending: INTERNAL MEDICINE | Admitting: INTERNAL MEDICINE
Payer: MEDICARE

## 2017-12-13 PROBLEM — I25.10 CAD (CORONARY ARTERY DISEASE): Status: ACTIVE | Noted: 2017-12-13

## 2017-12-13 LAB
ALBUMIN SERPL-MCNC: 3.3 G/DL (ref 3.4–5)
ALBUMIN/GLOB SERPL: 1 {RATIO} (ref 0.8–1.7)
ALP SERPL-CCNC: 84 U/L (ref 45–117)
ALT SERPL-CCNC: 23 U/L (ref 16–61)
ANION GAP SERPL CALC-SCNC: 5 MMOL/L (ref 3–18)
APTT PPP: 29.6 SEC (ref 23–36.4)
AST SERPL-CCNC: 20 U/L (ref 15–37)
BASOPHILS # BLD: 0 K/UL (ref 0–0.06)
BASOPHILS NFR BLD: 1 % (ref 0–2)
BILIRUB SERPL-MCNC: 0.7 MG/DL (ref 0.2–1)
BUN SERPL-MCNC: 14 MG/DL (ref 7–18)
BUN/CREAT SERPL: 14 (ref 12–20)
CALCIUM SERPL-MCNC: 8.8 MG/DL (ref 8.5–10.1)
CHLORIDE SERPL-SCNC: 107 MMOL/L (ref 100–108)
CO2 SERPL-SCNC: 32 MMOL/L (ref 21–32)
CREAT SERPL-MCNC: 1.02 MG/DL (ref 0.6–1.3)
DIFFERENTIAL METHOD BLD: ABNORMAL
EOSINOPHIL # BLD: 0.2 K/UL (ref 0–0.4)
EOSINOPHIL NFR BLD: 3 % (ref 0–5)
ERYTHROCYTE [DISTWIDTH] IN BLOOD BY AUTOMATED COUNT: 14.8 % (ref 11.6–14.5)
GLOBULIN SER CALC-MCNC: 3.4 G/DL (ref 2–4)
GLUCOSE SERPL-MCNC: 111 MG/DL (ref 74–99)
HCT VFR BLD AUTO: 41.4 % (ref 36–48)
HGB BLD-MCNC: 13.6 G/DL (ref 13–16)
INR PPP: 1.1 (ref 0.8–1.2)
LYMPHOCYTES # BLD: 2.4 K/UL (ref 0.9–3.6)
LYMPHOCYTES NFR BLD: 36 % (ref 21–52)
MCH RBC QN AUTO: 29.8 PG (ref 24–34)
MCHC RBC AUTO-ENTMCNC: 32.9 G/DL (ref 31–37)
MCV RBC AUTO: 90.6 FL (ref 74–97)
MONOCYTES # BLD: 0.4 K/UL (ref 0.05–1.2)
MONOCYTES NFR BLD: 5 % (ref 3–10)
NEUTS SEG # BLD: 3.7 K/UL (ref 1.8–8)
NEUTS SEG NFR BLD: 55 % (ref 40–73)
PLATELET # BLD AUTO: 176 K/UL (ref 135–420)
PMV BLD AUTO: 11.5 FL (ref 9.2–11.8)
POTASSIUM SERPL-SCNC: 4.2 MMOL/L (ref 3.5–5.5)
PROT SERPL-MCNC: 6.7 G/DL (ref 6.4–8.2)
PROTHROMBIN TIME: 13.7 SEC (ref 11.5–15.2)
RBC # BLD AUTO: 4.57 M/UL (ref 4.7–5.5)
SODIUM SERPL-SCNC: 144 MMOL/L (ref 136–145)
WBC # BLD AUTO: 6.7 K/UL (ref 4.6–13.2)

## 2017-12-13 PROCEDURE — 80053 COMPREHEN METABOLIC PANEL: CPT | Performed by: INTERNAL MEDICINE

## 2017-12-13 PROCEDURE — 99218 HC RM OBSERVATION: CPT

## 2017-12-13 PROCEDURE — 36415 COLL VENOUS BLD VENIPUNCTURE: CPT | Performed by: INTERNAL MEDICINE

## 2017-12-13 PROCEDURE — 93452 LEFT HRT CATH W/VENTRCLGRPHY: CPT

## 2017-12-13 PROCEDURE — 85730 THROMBOPLASTIN TIME PARTIAL: CPT | Performed by: INTERNAL MEDICINE

## 2017-12-13 PROCEDURE — 74011250637 HC RX REV CODE- 250/637: Performed by: INTERNAL MEDICINE

## 2017-12-13 PROCEDURE — 74011250636 HC RX REV CODE- 250/636: Performed by: INTERNAL MEDICINE

## 2017-12-13 PROCEDURE — 77030027138 HC INCENT SPIROMETER -A

## 2017-12-13 PROCEDURE — 74011000250 HC RX REV CODE- 250

## 2017-12-13 PROCEDURE — 85025 COMPLETE CBC W/AUTO DIFF WBC: CPT | Performed by: INTERNAL MEDICINE

## 2017-12-13 PROCEDURE — 85610 PROTHROMBIN TIME: CPT | Performed by: INTERNAL MEDICINE

## 2017-12-13 PROCEDURE — 74011250636 HC RX REV CODE- 250/636

## 2017-12-13 RX ORDER — HEPARIN SODIUM 200 [USP'U]/100ML
INJECTION, SOLUTION INTRAVENOUS
Status: DISPENSED
Start: 2017-12-13 | End: 2017-12-13

## 2017-12-13 RX ORDER — HEPARIN SODIUM 1000 [USP'U]/ML
4000 INJECTION, SOLUTION INTRAVENOUS; SUBCUTANEOUS
Status: DISCONTINUED | OUTPATIENT
Start: 2017-12-13 | End: 2017-12-13 | Stop reason: HOSPADM

## 2017-12-13 RX ORDER — TRIPROLIDINE/PSEUDOEPHEDRINE 2.5MG-60MG
200 TABLET ORAL
Status: DISCONTINUED | OUTPATIENT
Start: 2017-12-13 | End: 2017-12-15 | Stop reason: HOSPADM

## 2017-12-13 RX ORDER — TELMISARTAN 40 MG/1
20 TABLET ORAL DAILY
Status: DISCONTINUED | OUTPATIENT
Start: 2017-12-13 | End: 2017-12-15 | Stop reason: HOSPADM

## 2017-12-13 RX ORDER — HEPARIN SODIUM 200 [USP'U]/100ML
500 INJECTION, SOLUTION INTRAVENOUS
Status: DISCONTINUED | OUTPATIENT
Start: 2017-12-13 | End: 2017-12-13 | Stop reason: HOSPADM

## 2017-12-13 RX ORDER — METOPROLOL SUCCINATE 25 MG/1
50 TABLET, EXTENDED RELEASE ORAL DAILY
Status: DISCONTINUED | OUTPATIENT
Start: 2017-12-13 | End: 2017-12-13 | Stop reason: SDUPTHER

## 2017-12-13 RX ORDER — LORAZEPAM 0.5 MG/1
0.5 TABLET ORAL
Status: DISCONTINUED | OUTPATIENT
Start: 2017-12-13 | End: 2017-12-15 | Stop reason: HOSPADM

## 2017-12-13 RX ORDER — FENTANYL CITRATE 50 UG/ML
25-100 INJECTION, SOLUTION INTRAMUSCULAR; INTRAVENOUS
Status: DISCONTINUED | OUTPATIENT
Start: 2017-12-13 | End: 2017-12-13 | Stop reason: HOSPADM

## 2017-12-13 RX ORDER — HYDROXYZINE HYDROCHLORIDE 10 MG/1
10 TABLET, FILM COATED ORAL
Status: DISCONTINUED | OUTPATIENT
Start: 2017-12-13 | End: 2017-12-15 | Stop reason: HOSPADM

## 2017-12-13 RX ORDER — SOLIFENACIN SUCCINATE 5 MG/1
10 TABLET, FILM COATED ORAL DAILY
Status: DISCONTINUED | OUTPATIENT
Start: 2017-12-13 | End: 2017-12-15 | Stop reason: HOSPADM

## 2017-12-13 RX ORDER — CLOPIDOGREL BISULFATE 75 MG/1
75 TABLET ORAL DAILY
Status: DISCONTINUED | OUTPATIENT
Start: 2017-12-13 | End: 2017-12-13 | Stop reason: SDUPTHER

## 2017-12-13 RX ORDER — IBUPROFEN 200 MG
200 TABLET ORAL
Status: DISCONTINUED | OUTPATIENT
Start: 2017-12-13 | End: 2017-12-13 | Stop reason: SDUPTHER

## 2017-12-13 RX ORDER — METOPROLOL SUCCINATE 50 MG/1
50 TABLET, EXTENDED RELEASE ORAL DAILY
Status: DISCONTINUED | OUTPATIENT
Start: 2017-12-13 | End: 2017-12-15 | Stop reason: HOSPADM

## 2017-12-13 RX ORDER — SIMVASTATIN 20 MG/1
20 TABLET, FILM COATED ORAL
Status: DISCONTINUED | OUTPATIENT
Start: 2017-12-13 | End: 2017-12-15 | Stop reason: HOSPADM

## 2017-12-13 RX ORDER — FENTANYL CITRATE 50 UG/ML
INJECTION, SOLUTION INTRAMUSCULAR; INTRAVENOUS
Status: COMPLETED
Start: 2017-12-13 | End: 2017-12-13

## 2017-12-13 RX ORDER — GUAIFENESIN 100 MG/5ML
81 LIQUID (ML) ORAL DAILY
Status: DISCONTINUED | OUTPATIENT
Start: 2017-12-13 | End: 2017-12-15 | Stop reason: HOSPADM

## 2017-12-13 RX ORDER — LIDOCAINE HYDROCHLORIDE 10 MG/ML
INJECTION INFILTRATION; PERINEURAL
Status: COMPLETED
Start: 2017-12-13 | End: 2017-12-13

## 2017-12-13 RX ORDER — BIVALIRUDIN 250 MG/5ML
0.75 INJECTION, POWDER, LYOPHILIZED, FOR SOLUTION INTRAVENOUS ONCE
Status: DISCONTINUED | OUTPATIENT
Start: 2017-12-13 | End: 2017-12-13 | Stop reason: HOSPADM

## 2017-12-13 RX ORDER — GUAIFENESIN 100 MG/5ML
81 LIQUID (ML) ORAL DAILY
Status: DISCONTINUED | OUTPATIENT
Start: 2017-12-13 | End: 2017-12-13 | Stop reason: SDUPTHER

## 2017-12-13 RX ORDER — CLOPIDOGREL BISULFATE 75 MG/1
75 TABLET ORAL DAILY
Status: DISCONTINUED | OUTPATIENT
Start: 2017-12-13 | End: 2017-12-15 | Stop reason: HOSPADM

## 2017-12-13 RX ORDER — MIDAZOLAM HYDROCHLORIDE 1 MG/ML
INJECTION, SOLUTION INTRAMUSCULAR; INTRAVENOUS
Status: COMPLETED
Start: 2017-12-13 | End: 2017-12-13

## 2017-12-13 RX ORDER — SODIUM CHLORIDE 900 MG/100ML
INJECTION INTRAVENOUS
Status: DISPENSED
Start: 2017-12-13 | End: 2017-12-13

## 2017-12-13 RX ORDER — NITROGLYCERIN 5 MG/ML
INJECTION, SOLUTION INTRAVENOUS
Status: DISPENSED
Start: 2017-12-13 | End: 2017-12-13

## 2017-12-13 RX ORDER — LIDOCAINE HYDROCHLORIDE 10 MG/ML
10-30 INJECTION INFILTRATION; PERINEURAL
Status: DISCONTINUED | OUTPATIENT
Start: 2017-12-13 | End: 2017-12-13 | Stop reason: HOSPADM

## 2017-12-13 RX ORDER — MIDAZOLAM HYDROCHLORIDE 1 MG/ML
.5-2 INJECTION, SOLUTION INTRAMUSCULAR; INTRAVENOUS
Status: DISCONTINUED | OUTPATIENT
Start: 2017-12-13 | End: 2017-12-13 | Stop reason: HOSPADM

## 2017-12-13 RX ORDER — SODIUM CHLORIDE 9 MG/ML
50 INJECTION, SOLUTION INTRAVENOUS CONTINUOUS
Status: DISCONTINUED | OUTPATIENT
Start: 2017-12-13 | End: 2017-12-15 | Stop reason: HOSPADM

## 2017-12-13 RX ORDER — BIVALIRUDIN 250 MG/5ML
INJECTION, POWDER, LYOPHILIZED, FOR SOLUTION INTRAVENOUS
Status: DISCONTINUED
Start: 2017-12-13 | End: 2017-12-13

## 2017-12-13 RX ADMIN — SODIUM CHLORIDE 50 ML/HR: 900 INJECTION, SOLUTION INTRAVENOUS at 07:52

## 2017-12-13 RX ADMIN — SIMVASTATIN 20 MG: 20 TABLET, FILM COATED ORAL at 23:20

## 2017-12-13 RX ADMIN — SODIUM CHLORIDE 50 ML/HR: 900 INJECTION, SOLUTION INTRAVENOUS at 18:50

## 2017-12-13 RX ADMIN — CLOPIDOGREL BISULFATE 75 MG: 75 TABLET ORAL at 10:03

## 2017-12-13 RX ADMIN — HYDROXYZINE HYDROCHLORIDE 10 MG: 10 TABLET ORAL at 23:20

## 2017-12-13 RX ADMIN — FENTANYL CITRATE 50 MCG: 50 INJECTION, SOLUTION INTRAMUSCULAR; INTRAVENOUS at 08:25

## 2017-12-13 RX ADMIN — SODIUM CHLORIDE 50 ML/HR: 900 INJECTION, SOLUTION INTRAVENOUS at 10:05

## 2017-12-13 RX ADMIN — SODIUM CHLORIDE 50 ML/HR: 900 INJECTION, SOLUTION INTRAVENOUS at 08:42

## 2017-12-13 RX ADMIN — LIDOCAINE HYDROCHLORIDE 15 ML: 10 INJECTION, SOLUTION INFILTRATION; PERINEURAL at 08:43

## 2017-12-13 RX ADMIN — MIDAZOLAM HYDROCHLORIDE 1 MG: 1 INJECTION, SOLUTION INTRAMUSCULAR; INTRAVENOUS at 08:25

## 2017-12-13 RX ADMIN — METOPROLOL SUCCINATE 50 MG: 50 TABLET, EXTENDED RELEASE ORAL at 10:03

## 2017-12-13 RX ADMIN — LIDOCAINE HYDROCHLORIDE 15 ML: 10 INJECTION INFILTRATION; PERINEURAL at 08:43

## 2017-12-13 RX ADMIN — TELMISARTAN 20 MG: 40 TABLET ORAL at 10:03

## 2017-12-13 RX ADMIN — ASPIRIN 81 MG 81 MG: 81 TABLET ORAL at 10:03

## 2017-12-13 RX ADMIN — SOLIFENACIN SUCCINATE 10 MG: 5 TABLET, FILM COATED ORAL at 10:04

## 2017-12-13 NOTE — ROUTINE PROCESS
Cardiac Cath Lab:  Pre Procedure Chart Check     Patients chart was accessed and reviewed for possible and/or scheduled procedure. Creatinine Clearance: Total Contrast  Load:  3 x estimated clearance amount=  317.4  ml    75% of Contrast Load:  0.75 x Total Contrast Load=  238.0      ml    Recent Labs      12/13/17   0719   INR  1.1   APTT  29.6   PTP  13.7       BMI: Body mass index is 35.57 kg/(m^2).     ALLERGIES: No Known Allergies    Lines:        Peripheral IV 12/13/17 Right Forearm (Active)   Site Assessment Clean, dry, & intact 12/13/2017  7:51 AM   Phlebitis Assessment 0 12/13/2017  7:51 AM   Infiltration Assessment 0 12/13/2017  7:51 AM   Dressing Status Clean, dry, & intact 12/13/2017  7:51 AM   Dressing Type Tape;Transparent 12/13/2017  7:51 AM   Hub Color/Line Status Infusing 12/13/2017  7:51 AM   Action Taken Open ports on tubing capped 12/13/2017  7:51 AM   Alcohol Cap Used Yes 12/13/2017  7:51 AM          History:    Past Medical History:   Diagnosis Date    CAP (community acquired pneumonia)     DI (detrusor instability)     Erectile dysfunction     Prostate cancer ([address]     Urgency of urination     Urinary frequency      Past Surgical History:   Procedure Laterality Date    HX CHOLECYSTECTOMY      HX HERNIA REPAIR      HX KNEE REPLACEMENT      bi-late    HX SHOULDER REPLACEMENT       Patient Active Problem List   Diagnosis Code    Malignant neoplasm of prostate ([address] Shira Joseph

## 2017-12-13 NOTE — ROUTINE PROCESS
TRANSFER - IN REPORT:    Verbal report received from [address] [place] [address], RN(name) on Landon Whitt  being received from Cath Lab(unit) for routine progression of care      Report consisted of patients Situation, Background, Assessment and   Recommendations(SBAR). Information from the following report(s) SBAR, Kardex, Procedure Summary, Intake/Output, MAR, Recent Results and Med Rec Status was reviewed with the receiving nurse. Opportunity for questions and clarification was provided. Assessment completed upon patients arrival to unit and care assumed.

## 2017-12-13 NOTE — PROGRESS NOTES
1100 Pt transferred from the Cath lab. No signs of distress noted. Oriented to room. Call bell and phone are within reach.

## 2017-12-13 NOTE — PROGRESS NOTES
Patient in for cath as outpatient. Had issues with right groin sheath and it had to be removed. Will monitor groin, etc overnight and proceed with cath tomorrow AM if all is well.      Bee Dhillon MD

## 2017-12-13 NOTE — ROUTINE PROCESS
TRANSFER - OUT REPORT:    Verbal report given to Marianne Coates RN(name) on Anibal Hurtado  being transferred to [address] [place] [address] for routine progression of care       Report consisted of patients Situation, Background, Assessment and   Recommendations(SBAR). Information from the following report(s) SBAR, Kardex, Procedure Summary, Intake/Output, Accordion, Recent Results, Med Rec Status and Cardiac Rhythm SB was reviewed with the receiving nurse. Lines:   Peripheral IV 12/13/17 Right Forearm (Active)   Site Assessment Clean, dry, & intact 12/13/2017  7:51 AM   Phlebitis Assessment 0 12/13/2017  7:51 AM   Infiltration Assessment 0 12/13/2017  7:51 AM   Dressing Status Clean, dry, & intact 12/13/2017  7:51 AM   Dressing Type Tape;Transparent 12/13/2017  7:51 AM   Hub Color/Line Status Infusing 12/13/2017  7:51 AM   Action Taken Open ports on tubing capped 12/13/2017  7:51 AM   Alcohol Cap Used Yes 12/13/2017  7:51 AM        Opportunity for questions and clarification was provided.       Patient transported with:   Monitor

## 2017-12-13 NOTE — H&P
Date of Surgery Update:  Ab Trevizo was seen and examined. History and physical has been reviewed. The patient has been examined. There have been no significant clinical changes since the completion of the originally dated History and Physical. Plan cath with moderate sedation.        Signed By: Jairon Will MD     December 13, 2017 8:24 AM

## 2017-12-13 NOTE — ROUTINE PROCESS
TRANSFER - OUT REPORT:  Verbal report given to Antoine Benoit RN on Estefany Nichols being transferred to care unit for routine post - op   Report consisted of patients Situation, Background, Assessment and   Recommendations(SBAR). Information from the following report(s) SBAR was reviewed with the receiving nurse.     Cath Lab Report:    Procedure:  [ ] LHC  [ ] RHC  [ ] PTCA   [x ] Peripheral   [ ] Pacemaker [ ] LIAM  [ ] [address]    Access site:   [ ] Radial     [ ] Brachial     [RFA ] Femoral    [ ] Jugular   [ ] Chest Wall       Sheath:           [x ] Pulled in Cath Lab   [ ] In place   [ ] To be pulled after:         Closure:          [ ] TR Band [ ] Radial Band  [ ] Right [ ] Left    Elver.Pablo ] Manual Pressure     [ ] Dunham Karine     [ ] Star Close    [ ] Per Close    [ ] Safe Guard    Site Assessment:   [x ] Clean, Dry, No bleeding    [ ] Minor oozing          [ ] Hematoma: Description:  Lines:        Peripheral IV 12/13/17 Right Forearm (Active)   Site Assessment Clean, dry, & intact 12/13/2017  7:51 AM   Phlebitis Assessment 0 12/13/2017  7:51 AM   Infiltration Assessment 0 12/13/2017  7:51 AM   Dressing Status Clean, dry, & intact 12/13/2017  7:51 AM   Dressing Type Tape;Transparent 12/13/2017  7:51 AM   Hub Color/Line Status Infusing 12/13/2017  7:51 AM   Action Taken Open ports on tubing capped 12/13/2017  7:51 AM   Alcohol Cap Used Yes 12/13/2017  7:51 AM          Patient Vitals for the past 4 hrs:   Temp Pulse Resp BP SpO2   12/13/17 0859 98.1 °F (36.7 °C) (!) 58 14 141/74 98 %   12/13/17 0744 97.9 °F (36.6 °C) (!) 48 - 145/65 -         Extended / Orthostatic Vitals:    Vital Signs  Level of Consciousness: Alert (12/13/17 0859)  Temp: 98.1 °F (36.7 °C) (12/13/17 0859)  Temp Source: Oral (12/13/17 0859)  Pulse (Heart Rate): (!) 58 (12/13/17 0859)  Heart Rate Source: Monitor (12/13/17 0859)  Cardiac Rhythm: Sinus bradycardia (12/13/17 0859)  Resp Rate: 14 (12/13/17 0859)  BP: 141/74 (12/13/17 0859)  MAP (Calculated): 96 (12/13/17 0859)  BP 1 Location: Right arm (12/13/17 0744)  BP 1 Method: Automatic (12/13/17 0744)  BP Patient Position: At rest (12/13/17 0744)  MEWS Score: 0 (12/13/17 0859)         Oxygen Therapy  O2 Sat (%): 98 % (12/13/17 0859)  O2 Device: Nasal cannula (12/13/17 0859)  O2 Flow Rate (L/min): 2 l/min (12/13/17 0859)          Opportunity for questions and clarification was provided.

## 2017-12-13 NOTE — ROUTINE PROCESS
TRANSFER - IN REPORT:    Verbal report received from Antoine Benoit RN on Estefany Simone  being received from care unit for ordered procedure      Report consisted of patients Situation, Background, Assessment and   Recommendations(SBAR). Information from the following report(s) SBAR was reviewed with the receiving nurse. Opportunity for questions and clarification was provided. Assessment completed upon patients arrival to unit and care assumed.        Sheath:                            Peripheral Intravenous Line:  Peripheral IV 12/13/17 Right Forearm (Active)   Site Assessment Clean, dry, & intact 12/13/2017  7:51 AM   Phlebitis Assessment 0 12/13/2017  7:51 AM   Infiltration Assessment 0 12/13/2017  7:51 AM   Dressing Status Clean, dry, & intact 12/13/2017  7:51 AM   Dressing Type Tape;Transparent 12/13/2017  7:51 AM   Hub Color/Line Status Infusing 12/13/2017  7:51 AM   Action Taken Open ports on tubing capped 12/13/2017  7:51 AM   Alcohol Cap Used Yes 12/13/2017  7:51 AM       Extended / Orthostatic Vitals:    Vital Signs  Level of Consciousness: Alert (12/13/17 0744)  Temp: 97.9 °F (36.6 °C) (12/13/17 0744)  Temp Source: Oral (12/13/17 0744)  Pulse (Heart Rate): (!) 48 (12/13/17 0744)  Heart Rate Source: Monitor (12/13/17 0744)  Cardiac Rhythm: Sinus bradycardia (12/13/17 0744)  BP: 145/65 (12/13/17 0744)  MAP (Calculated): 92 (12/13/17 0744)  BP 1 Location: Right arm (12/13/17 0744)  BP 1 Method: Automatic (12/13/17 0744)  BP Patient Position: At rest (12/13/17 0744)         Oxygen Therapy  O2 Device: Room air (12/13/17 0744)    Accompanied by Cath Lab [facility]

## 2017-12-13 NOTE — IP AVS SNAPSHOT
Roseann Su 
 
 
 [address] [facility] [address] 
[phone] Patient: Hemant Schreiber MRN: [id number] THQ:[id number] About your hospitalization You were admitted on:  December 13, 2017 You last received care in the:  [facility] You were discharged on:  December 15, 2017 Why you were hospitalized Your primary diagnosis was:  Not on File Your diagnoses also included:  Cad (Coronary Artery Disease) Things You Need To Do (next 8 weeks) Follow up with Jami Bansal MD  
[de-identified] office is closed today. Please call Monday morning to schedule a follow-up appointment. Phone:  [phone] Where:  [address], [address] [facility] [address] [address] Follow up with Niraj Ngo NP Phone:  [phone] Where:  [address], [address] [place] [address] Discharge Orders None A check elisabeth indicates which time of day the medication should be taken. My Medications TAKE these medications as instructed Instructions Each Dose to Equal  
 Morning Noon Evening Bedtime  
 aspirin delayed-release 81 mg tablet Your next dose is:  12/16/17 am  
   
 Take 81 mg by mouth daily. 81 mg  
    
  
   
   
   
  
 hydrOXYzine HCl 10 mg tablet Commonly known as:  ATARAX Your next dose is:  12/15/17 bedtime Take 10 mg by mouth nightly. 10 mg  
    
   
   
   
  
  
 ibuprofen 200 mg tablet Commonly known as:  MOTRIN Your last dose was:  None given today Your next dose is: Take as directed Take 400 mg by mouth every six (6) hours as needed for Pain. 400 mg  
    
   
   
   
  
 metoprolol succinate 50 mg XL tablet Commonly known as:  TOPROL XL Your next dose is:  12/16/17 am 
  
   
 Take 1 Tab by mouth daily. 50 mg  
    
  
   
   
   
  
 MICARDIS 20 mg tablet Generic drug:  telmisartan Your next dose is:  12/15/17 bedtime Take  by mouth daily. MYRBETRIQ 25 mg ER tablet Generic drug:  mirabegron ER Your next dose is:  12/15/17 noon Notes to Patient:  Not available by pharmacy this morning Take 50 mg by mouth daily. 50 mg  
    
   
  
   
   
  
 PLAVIX 75 mg Tab Generic drug:  clopidogrel Your next dose is:  12/16/17 am 
  
   
 Take 75 mg by mouth. 75 mg  
    
  
   
   
   
  
 VESIcare 10 mg tablet Generic drug:  solifenacin Your next dose is:  12/15/17 bedtime TAKE 1 TABLET DAILY ZOCOR 20 mg tablet Generic drug:  simvastatin Your next dose is:  12/15/17 bedtime Take  by mouth nightly. Discharge Instructions DISCHARGE SUMMARY from Nurse PATIENT INSTRUCTIONS: 
 
 
F-face looks uneven A-arms unable to move or move unevenly S-speech slurred or non-existent T-time-call 911 as soon as signs and symptoms begin-DO NOT go Back to bed or wait to see if you get better-TIME IS BRAIN. Warning Signs of HEART ATTACK Call 911 if you have these symptoms: 
? Chest discomfort. Most heart attacks involve discomfort in the center of the chest that lasts more than a few minutes, or that goes away and comes back. It can feel like uncomfortable pressure, squeezing, fullness, or pain. ? Discomfort in other areas of the upper body. Symptoms can include pain or discomfort in one or both arms, the back, neck, jaw, or stomach. ? Shortness of breath with or without chest discomfort. ? Other signs may include breaking out in a cold sweat, nausea, or lightheadedness. Don't wait more than five minutes to call [address]! Fast action can save your life.  Calling 911 is almost always the fastest way to get lifesaving treatment. Emergency Medical Services staff can begin treatment when they arrive  up to an hour sooner than if someone gets to the hospital by car. The discharge information has been reviewed with the patient. The patient verbalized understanding. Discharge medications reviewed with the patient and appropriate educational materials and side effects teaching were provided. Patient armband removed and shredded 
 
___________________________________________________________________________________________________________________________________ Cardiac Catheterization/Angiography Discharge Instructions *Check the puncture site frequently for swelling or bleeding. If you see any bleeding, lie down and apply pressure over the area with a clean town or washcloth. Notify your doctor for any redness, swelling, drainage or oozing from the puncture site. Notify your doctor for any fever or chills. *If the leg or arm with the puncture becomes cold, numb or painful, call Dr Benjamin Swan *Activity should be limited for the next 48 hours. Climb stairs as little as possible and avoid any stooping, bending or strenuous activity for 48 hours. No heavy lifting (anything over 10 pounds) for three days. *Do not drive for 48 hours. *You may resume your usual diet. Drink more fluids than usual. 
 
*Have a responsible person drive you home and stay with you for at least 24 hours after your heart catheterization/angiography. *You may remove the bandage from your Right in 24 hours. You may shower in 24 hours. No tub baths, hot tubs or swimming for one week. Do not place any lotions, creams, powders, ointments over the puncture site for one week. You may place a clean band-aid over the puncture site each day for 5 days. Change this daily. Sedation for a Medical Procedure: Care Instructions Your Care Instructions For a minor procedure or surgery, you will get a sedative to help you relax. This drug will make you sleepy. It is usually given in a vein (by IV). A shot may also be used to numb the area. If you had local anesthesia, you may feel some pain and discomfort as it wears off. If you have pain, don't be afraid to say so. Pain medicine works better if you take it before the pain gets bad. Common side effects from sedation include: · Feeling sleepy. (Your doctors and nurses will make sure you are not too sleepy to go home.) · Nausea and vomiting. This usually does not last long. · Feeling tired. Follow-up care is a key part of your treatment and safety. Be sure to make and go to all appointments, and call your doctor if you are having problems. It's also a good idea to know your test results and keep a list of the medicines you take. How can you care for yourself at home? Activity ? · Don't do anything for 24 hours that requires attention to detail. It takes time for the medicine effects to completely wear off.  
? · For your safety, you should not drive or operate any machinery that could be dangerous until the medicine wears off and you can think clearly and react easily. ? · Rest when you feel tired. Getting enough sleep will help you recover. Diet ? · You can eat your normal diet, unless your doctor gives you other instructions. If your stomach is upset, try clear liquids and bland, low-fat foods like plain toast or rice. ? · Drink plenty of fluids (unless your doctor tells you not to). ? · Don't drink alcohol for 24 hours. Medicines ? · Be safe with medicines. Read and follow all instructions on the label. ¨ If the doctor gave you a prescription medicine for pain, take it as prescribed. ¨ If you are not taking a prescription pain medicine, ask your doctor if you can take an over-the-counter medicine.   
? · If you think your pain medicine is making you sick to your stomach: 
 ¨ Take your medicine after meals (unless your doctor has told you not to). ¨ Ask your doctor for a different pain medicine. When should you call for help? Call 911 anytime you think you may need emergency care. For example, call if: 
? · You have severe trouble breathing. ? · You passed out (lost consciousness). ?Call your doctor now or seek immediate medical care if: 
? · You have trouble breathing. ? · You have ongoing or worsening nausea or vomiting. ? · You have a fever. ? · You have a new or worse headache. ? · The medicine is not wearing off and you can't think clearly. ? Watch closely for changes in your health, and be sure to contact your doctor if: 
? · You do not get better as expected. Where can you learn more? Go to http://ann-jose.info/. Enter O104 in the search box to learn more about \"Sedation for a Medical Procedure: Care Instructions. \" Current as of: August 14, 2016 Content Version: 11.4 © 9982-5722 [employer] Wonks. Care instructions adapted under license by [employer] (which disclaims liability or warranty for this information). If you have questions about a medical condition or this instruction, always ask your healthcare professional. [place] [address] any warranty or liability for your use of this information. DISCHARGE SUMMARY from Nurse PATIENT INSTRUCTIONS: 
 
 
F-face looks uneven A-arms unable to move or move unevenly S-speech slurred or non-existent T-time-call 911 as soon as signs and symptoms begin-DO NOT go Back to bed or wait to see if you get better-TIME IS BRAIN. Warning Signs of HEART ATTACK Call 911 if you have these symptoms: 
? Chest discomfort.  Most heart attacks involve discomfort in the center of the chest that lasts more than a few minutes, or that goes away and comes back. It can feel like uncomfortable pressure, squeezing, fullness, or pain. ? Discomfort in other areas of the upper body. Symptoms can include pain or discomfort in one or both arms, the back, neck, jaw, or stomach. ? Shortness of breath with or without chest discomfort. ? Other signs may include breaking out in a cold sweat, nausea, or lightheadedness. Don't wait more than five minutes to call [address]! Fast action can save your life. Calling 911 is almost always the fastest way to get lifesaving treatment. Emergency Medical Services staff can begin treatment when they arrive  up to an hour sooner than if someone gets to the hospital by car. The discharge information has been reviewed with the patient and caregiver. The patient and caregiver verbalized understanding. Discharge medications reviewed with the patient and guardian and appropriate educational materials and side effects teaching were provided. ___________________________________________________________________________________________________________________________________ FreakOuthart Announcement We are excited to announce that we are making your provider's discharge notes available to you in Yu Rong. You will see these notes when they are completed and signed by the physician that discharged you from your recent hospital stay. If you have any questions or concerns about any information you see in FreakOuthart, please call the Health Information Department where you were seen or reach out to your Primary Care Provider for more information about your plan of care. Introducing [facility] [place] [facility]! Dear Karen Pete: Thank you for requesting a Yu Rong account. Our records indicate that you already have an active Yu Rong account. You can access your account anytime at [email or web address] Did you know that you can access your hospital and ER discharge instructions at any time in YuDoGlobal? You can also review all of your test results from your hospital stay or ER visit. Additional Information If you have questions, please visit the Frequently Asked Questions section of the YuDoGlobal website at [email or web address] Remember, YuDoGlobal is NOT to be used for urgent needs. For medical emergencies, dial 911. Now available from your iPhone and Android! Providers Seen During Your Hospitalization Provider Specialty Primary office phone Jay Tyler MD Cardiology [phone] Your Primary Care Physician (PCP) Primary Care Physician Office Phone Office Fax Stacy Pretty [phone] You are allergic to the following No active allergies Recent Documentation Height Weight BMI Smoking Status 1.791 m 112.9 kg 35.21 kg/m2 Former Smoker Emergency Contacts Name Discharge Info Relation Home Work Mobile ChavarriaМаринаreece DISCHARGE CAREGIVER [3] Spouse [3] [phone] Lisa Zamora DISCHARGE CAREGIVER [3] Daughter [21] [phone] Patient Belongings The following personal items are in your possession at time of discharge: 
  Dental Appliances: None  Visual Aid: Glasses      Home Medications: None   Jewelry: Watch, With patient  Clothing: Footwear, Pants, Shirt, Undergarments    Other Valuables: Cell Phone, Eyeglasses  Personal Items Sent to Safe: n/a Discharge Instructions Attachments/References CARDIAC REHABILITATION (ENGLISH) Patient Handouts Cardiac Rehabilitation: Care Instructions Your Care Instructions Cardiac rehabilitation is a program for people who have a heart problem, such as a heart attack, heart failure, or a heart valve disease.  The program includes exercise, lifestyle changes, education, and emotional support. Cardiac rehab can help you improve the quality of your life through better overall health. It can help you lose weight and feel better about yourself. On your cardiac rehab team, you may have your doctor, a nurse specialist, a dietitian, and a physical therapist. They will design your cardiac rehab program specifically for you. You will learn how to reduce your risk for heart problems, how to manage stress, and how to eat a heart-healthy diet. By the end of the program, you will be ready to maintain a healthier lifestyle on your own. Follow-up care is a key part of your treatment and safety. Be sure to make and go to all appointments, and call your doctor if you are having problems. It's also a good idea to know your test results and keep a list of the medicines you take. How can you care for yourself at home? · Take your medicines exactly as prescribed. Call your doctor if you think you are having a problem with your medicine. You will get more details on the specific medicines your doctor prescribes. · Weigh yourself every day if your doctor tells you to. Watch for sudden weight gain. Weigh yourself on the same scale with the same amount of clothing at the same time of day. · Plan your meals so that you are eating heart-healthy foods. ¨ Eat a variety of foods daily. Fresh fruits and vegetables and whole-grains are good choices. ¨ Limit your fat intake, especially saturated and trans fat. ¨ Limit salt (sodium). ¨ Increase fiber in your diet. ¨ Limit alcohol. · Learn how to take your pulse so that you can track your heart rate during exercise. · Always check with your doctor before you begin a new exercise program. 
· Warm up before you exercise and cool down afterward for at least 15 minutes each. This will help your heart gradually prepare for and recover from exercise and avoid pushing your heart too hard. · Stop exercising if you have any unusual discomfort, such as chest pain. · Do not smoke. Smoking can make heart problems worse. If you need help quitting, talk to your doctor about stop-smoking programs and medicines. These can increase your chances of quitting for good. When should you call for help? Call 911 anytime you think you may need emergency care. For example, call if: 
? · You have severe trouble breathing. ? · You cough up pink, foamy mucus and you have trouble breathing. ? · You have symptoms of a heart attack. These may include: ¨ Chest pain or pressure, or a strange feeling in the chest. 
¨ Sweating. ¨ Shortness of breath. ¨ Nausea or vomiting. ¨ Pain, pressure, or a strange feeling in the back, neck, jaw, or upper belly or in one or both shoulders or arms. ¨ Lightheadedness or sudden weakness. ¨ A fast or irregular heartbeat. After you call 911, the  may tell you to chew 1 adult-strength or 2 to 4 low-dose aspirin. Wait for an ambulance. Do not try to drive yourself. ? · You have angina symptoms (such as chest pain or pressure) that do not go away with rest or are not getting better within 5 minutes after you take a dose of nitroglycerin. ? · You have symptoms of a stroke. These may include: 
¨ Sudden numbness, tingling, weakness, or loss of movement in your face, arm, or leg, especially on only one side of your body. ¨ Sudden vision changes. ¨ Sudden trouble speaking. ¨ Sudden confusion or trouble understanding simple statements. ¨ Sudden problems with walking or balance. ¨ A sudden, severe headache that is different from past headaches. ? · You passed out (lost consciousness). ?Call your doctor now or seek immediate medical care if: 
? · You have new or increased shortness of breath. ? · You are dizzy or lightheaded, or you feel like you may faint. ? · You gain weight suddenly, such as more than 2 to 3 pounds in a day or 5 pounds in a week. (Your doctor may suggest a different range of weight gain.) ? · You have increased swelling in your legs, ankles, or feet. ? Watch closely for changes in your health, and be sure to contact your doctor if you have any problems. Where can you learn more? Go to http://ann-jose.info/. Enter S198 in the search box to learn more about \"Cardiac Rehabilitation: Care Instructions. \" Current as of: September 21, 2016 Content Version: 11.4 © 2006-2017 Healthwise, Incorporated. Care instructions adapted under license by [employer] (which disclaims liability or warranty for this information). If you have questions about a medical condition or this instruction, always ask your healthcare professional. [address] any warranty or liability for your use of this information. Please provide this summary of care documentation to your next provider. Signatures-by signing, you are acknowledging that this After Visit Summary has been reviewed with you and you have received a copy. Patient Signature:  ____________________________________________________________ Date:  ____________________________________________________________  
  
Yadi Lobato Provider Signature:  ____________________________________________________________ Date:  ____________________________________________________________

## 2017-12-13 NOTE — PROGRESS NOTES
Pt is all prepped and ready for procedure    0910 Pt back to care unit , Rt femoral accessed, procedure was not completed, will reattempts tomorrow. Tegaderm in place, dry and intact. Pt to be admitted to Telemetry, supervisor Spoke to for a bed assignment. 1115 Pt transferred in to room 359 via stretcher in stable condition , Bedrest for 4 hours till 1300.

## 2017-12-14 PROCEDURE — 74011000258 HC RX REV CODE- 258: Performed by: INTERNAL MEDICINE

## 2017-12-14 PROCEDURE — 99218 HC RM OBSERVATION: CPT

## 2017-12-14 PROCEDURE — 74011000250 HC RX REV CODE- 250: Performed by: INTERNAL MEDICINE

## 2017-12-14 PROCEDURE — 65660000000 HC RM CCU STEPDOWN

## 2017-12-14 PROCEDURE — 74011250636 HC RX REV CODE- 250/636: Performed by: INTERNAL MEDICINE

## 2017-12-14 PROCEDURE — 02703ZZ DILATION OF CORONARY ARTERY, ONE ARTERY, PERCUTANEOUS APPROACH: ICD-10-PCS | Performed by: INTERNAL MEDICINE

## 2017-12-14 PROCEDURE — B2111ZZ FLUOROSCOPY OF MULTIPLE CORONARY ARTERIES USING LOW OSMOLAR CONTRAST: ICD-10-PCS | Performed by: INTERNAL MEDICINE

## 2017-12-14 PROCEDURE — 4A023N7 MEASUREMENT OF CARDIAC SAMPLING AND PRESSURE, LEFT HEART, PERCUTANEOUS APPROACH: ICD-10-PCS | Performed by: INTERNAL MEDICINE

## 2017-12-14 PROCEDURE — B240ZZ3 ULTRASONOGRAPHY OF SINGLE CORONARY ARTERY, INTRAVASCULAR: ICD-10-PCS | Performed by: INTERNAL MEDICINE

## 2017-12-14 PROCEDURE — 027034Z DILATION OF CORONARY ARTERY, ONE ARTERY WITH DRUG-ELUTING INTRALUMINAL DEVICE, PERCUTANEOUS APPROACH: ICD-10-PCS | Performed by: INTERNAL MEDICINE

## 2017-12-14 PROCEDURE — 74011250636 HC RX REV CODE- 250/636

## 2017-12-14 PROCEDURE — 74011000250 HC RX REV CODE- 250

## 2017-12-14 PROCEDURE — 74011250637 HC RX REV CODE- 250/637: Performed by: INTERNAL MEDICINE

## 2017-12-14 PROCEDURE — 74011636320 HC RX REV CODE- 636/320: Performed by: INTERNAL MEDICINE

## 2017-12-14 PROCEDURE — 92928 PRQ TCAT PLMT NTRAC ST 1 LES: CPT

## 2017-12-14 RX ORDER — SODIUM CHLORIDE 0.9 % (FLUSH) 0.9 %
5-10 SYRINGE (ML) INJECTION AS NEEDED
Status: DISCONTINUED | OUTPATIENT
Start: 2017-12-14 | End: 2017-12-15 | Stop reason: HOSPADM

## 2017-12-14 RX ORDER — SODIUM CHLORIDE 900 MG/100ML
INJECTION INTRAVENOUS
Status: DISPENSED
Start: 2017-12-14 | End: 2017-12-14

## 2017-12-14 RX ORDER — BIVALIRUDIN 250 MG/5ML
INJECTION, POWDER, LYOPHILIZED, FOR SOLUTION INTRAVENOUS
Status: DISPENSED
Start: 2017-12-14 | End: 2017-12-14

## 2017-12-14 RX ORDER — MIDAZOLAM HYDROCHLORIDE 1 MG/ML
INJECTION, SOLUTION INTRAMUSCULAR; INTRAVENOUS
Status: COMPLETED
Start: 2017-12-14 | End: 2017-12-14

## 2017-12-14 RX ORDER — CLOPIDOGREL 300 MG/1
TABLET, FILM COATED ORAL
Status: DISPENSED
Start: 2017-12-14 | End: 2017-12-14

## 2017-12-14 RX ORDER — LIDOCAINE HYDROCHLORIDE 10 MG/ML
INJECTION INFILTRATION; PERINEURAL
Status: COMPLETED
Start: 2017-12-14 | End: 2017-12-14

## 2017-12-14 RX ORDER — MIDAZOLAM HYDROCHLORIDE 1 MG/ML
.5-2 INJECTION, SOLUTION INTRAMUSCULAR; INTRAVENOUS
Status: DISCONTINUED | OUTPATIENT
Start: 2017-12-14 | End: 2017-12-14 | Stop reason: HOSPADM

## 2017-12-14 RX ORDER — LIDOCAINE HYDROCHLORIDE 10 MG/ML
3-20 INJECTION INFILTRATION; PERINEURAL
Status: DISCONTINUED | OUTPATIENT
Start: 2017-12-14 | End: 2017-12-14 | Stop reason: HOSPADM

## 2017-12-14 RX ORDER — SODIUM CHLORIDE 0.9 % (FLUSH) 0.9 %
5-10 SYRINGE (ML) INJECTION EVERY 8 HOURS
Status: DISCONTINUED | OUTPATIENT
Start: 2017-12-14 | End: 2017-12-15 | Stop reason: HOSPADM

## 2017-12-14 RX ORDER — HEPARIN SODIUM 1000 [USP'U]/ML
1000-4000 INJECTION, SOLUTION INTRAVENOUS; SUBCUTANEOUS
Status: DISCONTINUED | OUTPATIENT
Start: 2017-12-14 | End: 2017-12-14 | Stop reason: HOSPADM

## 2017-12-14 RX ORDER — LIDOCAINE HYDROCHLORIDE AND EPINEPHRINE 10; 10 MG/ML; UG/ML
1.5 INJECTION, SOLUTION INFILTRATION; PERINEURAL ONCE
Status: COMPLETED | OUTPATIENT
Start: 2017-12-14 | End: 2017-12-14

## 2017-12-14 RX ORDER — BIVALIRUDIN 250 MG/5ML
INJECTION, POWDER, LYOPHILIZED, FOR SOLUTION INTRAVENOUS
Status: COMPLETED
Start: 2017-12-14 | End: 2017-12-14

## 2017-12-14 RX ORDER — HEPARIN SODIUM 200 [USP'U]/100ML
500 INJECTION, SOLUTION INTRAVENOUS
Status: DISCONTINUED | OUTPATIENT
Start: 2017-12-14 | End: 2017-12-14 | Stop reason: HOSPADM

## 2017-12-14 RX ORDER — CLOPIDOGREL 300 MG/1
300 TABLET, FILM COATED ORAL ONCE
Status: COMPLETED | OUTPATIENT
Start: 2017-12-14 | End: 2017-12-14

## 2017-12-14 RX ORDER — HEPARIN SODIUM 200 [USP'U]/100ML
INJECTION, SOLUTION INTRAVENOUS
Status: COMPLETED
Start: 2017-12-14 | End: 2017-12-14

## 2017-12-14 RX ORDER — FENTANYL CITRATE 50 UG/ML
25-100 INJECTION, SOLUTION INTRAMUSCULAR; INTRAVENOUS
Status: DISCONTINUED | OUTPATIENT
Start: 2017-12-14 | End: 2017-12-14 | Stop reason: HOSPADM

## 2017-12-14 RX ORDER — GUAIFENESIN 100 MG/5ML
LIQUID (ML) ORAL
Status: DISPENSED
Start: 2017-12-14 | End: 2017-12-14

## 2017-12-14 RX ORDER — ONDANSETRON 2 MG/ML
4 INJECTION INTRAMUSCULAR; INTRAVENOUS
Status: DISCONTINUED | OUTPATIENT
Start: 2017-12-14 | End: 2017-12-15 | Stop reason: HOSPADM

## 2017-12-14 RX ORDER — FENTANYL CITRATE 50 UG/ML
INJECTION, SOLUTION INTRAMUSCULAR; INTRAVENOUS
Status: COMPLETED
Start: 2017-12-14 | End: 2017-12-14

## 2017-12-14 RX ADMIN — FENTANYL CITRATE 50 MCG: 50 INJECTION, SOLUTION INTRAMUSCULAR; INTRAVENOUS at 09:40

## 2017-12-14 RX ADMIN — IOPAMIDOL 300 ML: 612 INJECTION, SOLUTION INTRAVENOUS at 09:47

## 2017-12-14 RX ADMIN — SIMVASTATIN 20 MG: 20 TABLET, FILM COATED ORAL at 21:48

## 2017-12-14 RX ADMIN — FENTANYL CITRATE 25 MCG: 50 INJECTION, SOLUTION INTRAMUSCULAR; INTRAVENOUS at 09:08

## 2017-12-14 RX ADMIN — LIDOCAINE HYDROCHLORIDE 15 ML: 10 INJECTION, SOLUTION INFILTRATION; PERINEURAL at 08:13

## 2017-12-14 RX ADMIN — BIVALIRUDIN 1.75 MG/KG/HR: 250 INJECTION, POWDER, LYOPHILIZED, FOR SOLUTION INTRAVENOUS at 08:21

## 2017-12-14 RX ADMIN — HYDROXYZINE HYDROCHLORIDE 10 MG: 10 TABLET ORAL at 21:48

## 2017-12-14 RX ADMIN — BIVALIRUDIN 1.75 MG/KG/HR: 250 INJECTION, POWDER, LYOPHILIZED, FOR SOLUTION INTRAVENOUS at 09:06

## 2017-12-14 RX ADMIN — MIDAZOLAM HYDROCHLORIDE 1 MG: 1 INJECTION, SOLUTION INTRAMUSCULAR; INTRAVENOUS at 07:46

## 2017-12-14 RX ADMIN — LIDOCAINE HYDROCHLORIDE 15 ML: 10 INJECTION INFILTRATION; PERINEURAL at 08:13

## 2017-12-14 RX ADMIN — FENTANYL CITRATE 25 MCG: 50 INJECTION, SOLUTION INTRAMUSCULAR; INTRAVENOUS at 08:08

## 2017-12-14 RX ADMIN — HEPARIN SODIUM 1000 UNITS: 200 INJECTION, SOLUTION INTRAVENOUS at 08:09

## 2017-12-14 RX ADMIN — ONDANSETRON 4 MG: 2 INJECTION INTRAMUSCULAR; INTRAVENOUS at 13:41

## 2017-12-14 RX ADMIN — SODIUM CHLORIDE 50 ML/HR: 900 INJECTION, SOLUTION INTRAVENOUS at 16:34

## 2017-12-14 RX ADMIN — CLOPIDOGREL BISULFATE 300 MG: 300 TABLET, FILM COATED ORAL at 09:50

## 2017-12-14 RX ADMIN — FENTANYL CITRATE 25 MCG: 50 INJECTION, SOLUTION INTRAMUSCULAR; INTRAVENOUS at 09:48

## 2017-12-14 RX ADMIN — BIVALIRUDIN 1.75 MG/KG/HR: 250 INJECTION, POWDER, LYOPHILIZED, FOR SOLUTION INTRAVENOUS at 11:35

## 2017-12-14 RX ADMIN — MIDAZOLAM HYDROCHLORIDE 0.5 MG: 1 INJECTION, SOLUTION INTRAMUSCULAR; INTRAVENOUS at 08:08

## 2017-12-14 RX ADMIN — BIVALIRUDIN 1.75 MG/KG/HR: 250 INJECTION, POWDER, LYOPHILIZED, FOR SOLUTION INTRAVENOUS at 10:05

## 2017-12-14 RX ADMIN — FENTANYL CITRATE 50 MCG: 50 INJECTION, SOLUTION INTRAMUSCULAR; INTRAVENOUS at 07:46

## 2017-12-14 RX ADMIN — LIDOCAINE HYDROCHLORIDE,EPINEPHRINE BITARTRATE 4 ML: 10; .01 INJECTION, SOLUTION INFILTRATION; PERINEURAL at 12:35

## 2017-12-14 RX ADMIN — MIDAZOLAM HYDROCHLORIDE 0.5 MG: 1 INJECTION, SOLUTION INTRAMUSCULAR; INTRAVENOUS at 09:07

## 2017-12-14 RX ADMIN — FENTANYL CITRATE 25 MCG: 50 INJECTION, SOLUTION INTRAMUSCULAR; INTRAVENOUS at 09:25

## 2017-12-14 RX ADMIN — BIVALIRUDIN 84.5 MG: 250 INJECTION, POWDER, LYOPHILIZED, FOR SOLUTION INTRAVENOUS at 08:21

## 2017-12-14 RX ADMIN — ASPIRIN 81 MG 81 MG: 81 TABLET ORAL at 09:50

## 2017-12-14 NOTE — PROGRESS NOTES
Pt returned to care unit  S/P stent placement. Rt femoral accessed, Tegaderm in place, with some bleeding, safeguard dressing applied by RCIS.

## 2017-12-14 NOTE — H&P
Date of Surgery Update:  Estefany Nichols was seen and examined. History and physical has been reviewed. The patient has been examined. There have been no significant clinical changes since the completion of the originally dated History and Physical.  Plan cath with moderate sedation.      Signed By: Yulissa Hamlin MD     December 14, 2017 7:57 AM

## 2017-12-14 NOTE — ROUTINE PROCESS
Bedside and Verbal shift change report given to Elise Edward RN (oncoming nurse) by Pietro Garza RN   (offgoing nurse). Report included the following information SBAR, Kardex, Procedure Summary, Intake/Output, MAR, Recent Results and Med Rec Status.

## 2017-12-14 NOTE — PROGRESS NOTES
1930: Assumed patient care, he was alert and oriented to person, place, time and situation. Right groin cath site dressing was clean, dry and intact with no signs of active bleeding, hematoma or infection noted. Respiratory status was stable on room air. Vital signs were stable. MEWS score was a two. Patient denied any nausea vomiting dizziness or anxiety. White board was updated and explained. Bed was locked and in lowest position. Call bell, water and personal belongings were within reach. Patient had no questions, comments or concerns after bedside shift report. 0000: Patient was NPO for a possible cardiac stent placement in the morning. He verbalized understanding. 0700: Patient had an uneventful shift. Respiratory status, vital signs and MEWS score remained stable. Patient was resting quietly with no signs of distress noted. Bed locked and in lowest position. Call bell water and personal belongings were within reach. Patient had no questions, comments or concerns after bedside shift report.  Bedside report given to Liliana Lentz R.N.

## 2017-12-14 NOTE — PROGRESS NOTES
TRANSFER - OUT REPORT:    Verbal report given to Roge Escudero RN on Jane Ledezma  being transferred to [facility] for routine progression of care       Report consisted of patients Situation, Background, Assessment and   Recommendations(SBAR). Information from the following report(s) Procedure Summary, Intake/Output, MAR and Cardiac Rhythm Sinus Henry Willson was reviewed with the receiving nurse. Lines:   Peripheral IV 12/13/17 Right Forearm (Active)   Site Assessment Clean, dry, & intact 12/13/2017  7:30 PM   Phlebitis Assessment 0 12/13/2017  7:30 PM   Infiltration Assessment 0 12/13/2017  7:30 PM   Dressing Status Clean, dry, & intact 12/13/2017  7:30 PM   Dressing Type Transparent;Tape 12/13/2017  7:30 PM   Hub Color/Line Status Pink;Flushed;Patent;Capped 12/13/2017  7:30 PM   Action Taken Open ports on tubing capped 12/13/2017  7:30 PM   Alcohol Cap Used Yes 12/13/2017  7:30 PM        Opportunity for questions and clarification was provided.       Patient transported with:   Monitor  Registered Nurse

## 2017-12-14 NOTE — PROCEDURES
Brief Cardiac Catheterization Procedure Note    Patient: Endy Vivar MRN: [id number]  SSN: [id number]    YOB: 1935  Age: [de-identified] y.o. Sex: male      Date of Procedure: 12/14/2017     Pre-procedure Diagnosis:  Coronary Artery Disease    Post-procedure Diagnosis: Same    Procedure: Left Heart Catheterization with Percutaneous Coronary Intervention    Performed By: Rosalina Bailey MD     Anesthesia: Moderate Sedation    Estimated Blood Loss: Less than 10 mL      Specimens: None    Findings: LAD, diagonal, CX and RCA stenoses. LAD treated with JUDY, angioplasty to Diag    Complications: None    Implants: 3.5mm x 16mm [facility] JUDY    Recommendations: Continue medical therapy.     Signed By: Rosalina Bailey MD     December 14, 2017

## 2017-12-14 NOTE — PROGRESS NOTES
8321 Patient transported to cath lab for procedure    0730 Assumed responsibility for patient from Macy Aceves RN. Patient had issues with room staying warm during the night and room would need to be changed. 1508 Received report from [facility] in cardiac care unit. Patient to be returning to floor shortly. 1545 Patient back on floor. Telemetry box and linens changed. [phone] with pharmacy about overdue meds from 900. Pharmacy advised to speak with [facility] as meds were sent to  for administration. Linwood Cottrell in [facility] stated patient did not receive any medications sent from pharmacy. Called pharmacy back and was instructed to send message to retime overdue medications. Cath lab advised to hold plavix as patient received loading dose in cath lab. Bedside shift change report given to Melody Mar RN (oncoming nurse) by Gage Mahajan RN (offgoing nurse). Report included the following information SBAR, Kardex and MAR.

## 2017-12-14 NOTE — PROGRESS NOTES
Dressing reapplied and pressure held for 30 minutes manually.   No hematoma or bleeding noted when checked with Markus Diaz RN

## 2017-12-14 NOTE — PHYSICIAN ADVISORY
Letter of Determination: Upgrade from Observation to Inpatient Status    Jane Ledezma was hospitalized on 12/13/2017 for a scheduled cardiac cath. The procedure was cancelled due to issues with right groin sheath, which had to be removed. His hospital stay has already crossed one medically necessary midnight for management of complication related to vascular access, requiring continued monitoring. On day 2, ongoing hospitalization is warranted for this patient with coronary disease, who just underwent left heart cath with PCI, has persistent bradycardia, with current plans including continued monitoring, and is expected to remain hospitalized through [facility]. Since Mr. Jane Ledezma is expected to need at least two midnights of medically necessary hospital care, he will meet the benchmark for Inpatient Admission status in accordance with CMS regulation Section 43 .3. Based on the documented clinical condition and care plan, we recommend upgrading patient's hospitalization status from OBSERVATION to INPATIENT status. The final decision regarding the patient's hospitalization status depends on the attending physician's clinical judgment.        Ana Aguayo MD, MARTA, FACP, [facility]  Physician [place].    December 14, 2017   12:16 PM

## 2017-12-14 NOTE — PROGRESS NOTES
Puncture site to right groin continues to steadily ooze. Dressing now saturated. Cath lab staff notified.

## 2017-12-14 NOTE — PROGRESS NOTES
Dressing changed and lido-epi administered to stop capillary bleeding at right groin per Dr. Lucius Jay. No hematoma or bleeding noted with RN after redressing.

## 2017-12-14 NOTE — PROGRESS NOTES
NUTRITION SCREENING    Recommendations: Continue w/ POC    RD ASSESSMENT/PLAN:     Diet:  Cardiac  Height: 5' 10.5\" (179.1 cm)     Food Allergies: NKFA  Weight: 112.7 kg (248 lb 7.3 oz)    PO Intake:  Patient Vitals for the past 100 hrs:   % Diet Eaten   12/13/17 1930 100 %   12/13/17 1707 100 %      BMI: 35.1 kg/m^2 is  obese (30%-39.9% BMI)      PMH: hernia, gout, anxiety, varicose veins, osteoarthritis, prostate ca, hyperlipidemia, mitral valve disorder, murmur, HTN, anxiety, chest pain, LVH, arthritis    Current Hospital Problems: Pt here for cath lab procedure in observation now. Nutrition intervention not currently indicated. Pt is not at nutritional risk at this time. Will rescreen per policy.      REASON FOR ASSESSMENT:   []  RN Referral:    [x] MST score >/=2  Malnutrition Screening Tool (MST):  Recently Lost Weight Without Trying: No  If Yes, How Much Weight Loss: Unsure  Eating Poorly Due to Decreased Appetite: No  MST Score: Mckenna Puga [address], RD  Pager: [phone]

## 2017-12-14 NOTE — ROUTINE PROCESS
Cardiac Cath Lab:  Pre Procedure Chart Check     Patients chart was accessed and reviewed for possible and/or scheduled procedure. Creatinine Clearance:  CREATININE: 1.02 MG/DL (12/13/17 0940)  Estimated creatinine clearance: 73.2 mL/min    Total Contrast  Load:  3 x estimated clearance amount=  219.6  ml    75% of Contrast Load:  0.75 x Total Contrast Load=   164.7   ml    Recent Labs      12/13/17   0940  12/13/17   0719   WBC  6.7   --    RBC  4.57*   --    HCT  41.4   --    HGB  13.6   --    PLT  176   --    INR   --   1.1   APTT   --   29.6   PTP   --   13.7   NA  144   --    K  4.2   --    BUN  14   --    CREA  1.02   --    GFRAA  >60   --    GFRNA  >60   --    CA  8.8   --        BMI: Body mass index is 35.15 kg/(m^2).     ALLERGIES: No Known Allergies    Lines:        Peripheral IV 12/13/17 Right Forearm (Active)   Site Assessment Clean, dry, & intact 12/13/2017  7:30 PM   Phlebitis Assessment 0 12/13/2017  7:30 PM   Infiltration Assessment 0 12/13/2017  7:30 PM   Dressing Status Clean, dry, & intact 12/13/2017  7:30 PM   Dressing Type Transparent;Tape 12/13/2017  7:30 PM   Hub Color/Line Status Pink;Flushed;Patent;Capped 12/13/2017  7:30 PM   Action Taken Open ports on tubing capped 12/13/2017  7:30 PM   Alcohol Cap Used Yes 12/13/2017  7:30 PM          History:    Past Medical History:   Diagnosis Date    CAP (community acquired pneumonia)     DI (detrusor instability)     Erectile dysfunction     Prostate cancer ([facility] [address]     Urgency of urination     Urinary frequency      Past Surgical History:   Procedure Laterality Date    HX CHOLECYSTECTOMY      HX HERNIA REPAIR      HX KNEE REPLACEMENT      bi-late    HX SHOULDER REPLACEMENT       Patient Active Problem List   Diagnosis Code    Malignant neoplasm of prostate ([facility] [address] C61    CAD (coronary artery disease) I25.10

## 2017-12-14 NOTE — ROUTINE PROCESS
TRANSFER - OUT REPORT:  Verbal report given to Professor Aneudy Curtis RN(name) on Wilber Pearson being transferred to CARE(unit) for routine progression of care   Report consisted of patients Situation, Background, Assessment and   Recommendations(SBAR). Information from the following report(s) Procedure Summary, Recent Results, Med Rec Status and Cardiac Rhythm SB was reviewed with the receiving nurse.     Cath Lab Report:    Procedure:  [facility] ] PTCA      Access site:  [facility] Femoral   RIGHT    [facility]           Closure:             [facility] Angio Seal     [facility]   [facility] Clean, Dry, No bleeding    [ ] Minor oozing          [ ] Hematoma: Description:    Stents(s) Placement:  [ ] Left Main:                 [facility] ] LAD:                [ ] Circ:                [ ] RCA:                [ ] EF:     [ ] Peripheral:      [ ] N/A    Infusion [X ]Angiomax   Intra procedure Medications:    Fentanyl:200 MCG  Versed:2 MG  PLAVIX 300 MG  ASA 81 MG    Lines:        Peripheral IV 12/13/17 Right Forearm (Active)   Site Assessment Clean, dry, & intact 12/13/2017  7:30 PM   Phlebitis Assessment 0 12/13/2017  7:30 PM   Infiltration Assessment 0 12/13/2017  7:30 PM   Dressing Status Clean, dry, & intact 12/13/2017  7:30 PM   Dressing Type Transparent;Tape 12/13/2017  7:30 PM   Hub Color/Line Status Pink;Flushed;Patent;Capped 12/13/2017  7:30 PM   Action Taken Open ports on tubing capped 12/13/2017  7:30 PM   Alcohol Cap Used Yes 12/13/2017  7:30 PM          Patient Vitals for the past 4 hrs:   Temp Pulse Resp BP SpO2   12/14/17 0954 98 °F (36.7 °C) (!) 44 18 148/73 98 %   12/14/17 0741 97.5 °F (36.4 °C) (!) 49 20 152/59 99 %         Extended / Orthostatic Vitals:    Vital Signs  Level of Consciousness: Alert (12/14/17 0954)  Temp: 98 °F (36.7 °C) (12/14/17 0954)  Temp Source: Oral (12/14/17 0954)  Pulse (Heart Rate): (!) 44 (12/14/17 0954)  Heart Rate Source: Monitor (12/14/17 9322)  Cardiac Rhythm: Sinus bradycardia (12/14/17 0954)  Resp Rate: 18 (12/14/17 0954)  BP: 148/73 (12/14/17 0954)  MAP (Monitor): 78 (12/13/17 1000)  MAP (Calculated): 98 (12/14/17 0954)  BP 1 Location: Left arm (12/14/17 0954)  BP 1 Method: Automatic (12/14/17 0954)  BP Patient Position: At rest (12/14/17 0954)  MEWS Score: 2 (12/14/17 0954)         Oxygen Therapy  O2 Sat (%): 98 % (12/14/17 0954)  Pulse via Oximetry: 51 beats per minute (12/13/17 1000)  O2 Device: Room air (12/14/17 0741)  O2 Flow Rate (L/min): 2 l/min (12/13/17 0859)          Opportunity for questions and clarification was provided.

## 2017-12-15 VITALS
HEART RATE: 49 BPM | DIASTOLIC BLOOD PRESSURE: 60 MMHG | HEIGHT: 71 IN | TEMPERATURE: 98.1 F | RESPIRATION RATE: 16 BRPM | WEIGHT: 248.9 LBS | BODY MASS INDEX: 34.85 KG/M2 | OXYGEN SATURATION: 93 % | SYSTOLIC BLOOD PRESSURE: 118 MMHG

## 2017-12-15 PROCEDURE — 74011250637 HC RX REV CODE- 250/637: Performed by: INTERNAL MEDICINE

## 2017-12-15 RX ADMIN — CLOPIDOGREL BISULFATE 75 MG: 75 TABLET ORAL at 08:56

## 2017-12-15 RX ADMIN — METOPROLOL SUCCINATE 50 MG: 50 TABLET, EXTENDED RELEASE ORAL at 08:57

## 2017-12-15 RX ADMIN — ASPIRIN 81 MG 81 MG: 81 TABLET ORAL at 08:56

## 2017-12-15 NOTE — PROGRESS NOTES
1930: Report received from [facility]. Pt appears stable. Groin site clean dry and intact. Will monitor. 2200: Pt took PO meds at this time. Tolerated well. No acute changes. No c/o pain. Will monitor    0000: Pt resting. No requests at this time. Will monitor. 0730: Bedside and Verbal shift change report given to Belkys SHER (oncoming nurse) by Regis Cespedes RN   (offgoing nurse). Report included the following information SBAR, Kardex and MAR. Uneventful shift. Groin assessment completed with off going nurse.

## 2017-12-15 NOTE — DISCHARGE INSTRUCTIONS
DISCHARGE SUMMARY from Nurse    PATIENT INSTRUCTIONS:    After general anesthesia or intravenous sedation, for 24 hours or while taking prescription Narcotics:  · Limit your activities  · Do not drive and operate hazardous machinery  · Do not make important personal or business decisions  · Do  not drink alcoholic beverages  · If you have not urinated within 8 hours after discharge, please contact your surgeon on call. Report the following to your surgeon:  · Excessive pain, swelling, redness or odor of or around the surgical area  · Temperature over 100.5  · Nausea and vomiting lasting longer than 4 hours or if unable to take medications  · Any signs of decreased circulation or nerve impairment to extremity: change in color, persistent  numbness, tingling, coldness or increase pain  · Any questions    What to do at Home:  Recommended activity: Activity as tolerated. please follow up with Dr Anibal Holland. *  Please give a list of your current medications to your Primary Care Provider. *  Please update this list whenever your medications are discontinued, doses are      changed, or new medications (including over-the-counter products) are added. *  Please carry medication information at all times in case of emergency situations. These are general instructions for a healthy lifestyle:    No smoking/ No tobacco products/ Avoid exposure to second hand smoke  Surgeon General's Warning:  Quitting smoking now greatly reduces serious risk to your health.     Obesity, smoking, and sedentary lifestyle greatly increases your risk for illness    A healthy diet, regular physical exercise & weight monitoring are important for maintaining a healthy lifestyle    You may be retaining fluid if you have a history of heart failure or if you experience any of the following symptoms:  Weight gain of 3 pounds or more overnight or 5 pounds in a week, increased swelling in our hands or feet or shortness of breath while lying flat in bed. Please call your doctor as soon as you notice any of these symptoms; do not wait until your next office visit. Recognize signs and symptoms of STROKE:    F-face looks uneven    A-arms unable to move or move unevenly    S-speech slurred or non-existent    T-time-call 911 as soon as signs and symptoms begin-DO NOT go       Back to bed or wait to see if you get better-TIME IS BRAIN. Warning Signs of HEART ATTACK     Call 911 if you have these symptoms:   Chest discomfort. Most heart attacks involve discomfort in the center of the chest that lasts more than a few minutes, or that goes away and comes back. It can feel like uncomfortable pressure, squeezing, fullness, or pain.  Discomfort in other areas of the upper body. Symptoms can include pain or discomfort in one or both arms, the back, neck, jaw, or stomach.  Shortness of breath with or without chest discomfort.  Other signs may include breaking out in a cold sweat, nausea, or lightheadedness. Don't wait more than five minutes to call 911 - MINUTES MATTER! Fast action can save your life. Calling 911 is almost always the fastest way to get lifesaving treatment. Emergency Medical Services staff can begin treatment when they arrive -- up to an hour sooner than if someone gets to the hospital by car. The discharge information has been reviewed with the patient. The patient verbalized understanding. Discharge medications reviewed with the patient and appropriate educational materials and side effects teaching were provided. Patient armband removed and shredded    ___________________________________________________________________________________________________________________________________                     Cardiac Catheterization/Angiography Discharge Instructions    *Check the puncture site frequently for swelling or bleeding.  If you see any bleeding, lie down and apply pressure over the area with a clean town or washcloth. Notify your doctor for any redness, swelling, drainage or oozing from the puncture site. Notify your doctor for any fever or chills. *If the leg or arm with the puncture becomes cold, numb or painful, call Dr Te Stinson     *Activity should be limited for the next 48 hours. Climb stairs as little as possible and avoid any stooping, bending or strenuous activity for 48 hours. No heavy lifting (anything over 10 pounds) for three days. *Do not drive for 48 hours. *You may resume your usual diet. Drink more fluids than usual.    *Have a responsible person drive you home and stay with you for at least 24 hours after your heart catheterization/angiography. *You may remove the bandage from your Right in 24 hours. You may shower in 24 hours. No tub baths, hot tubs or swimming for one week. Do not place any lotions, creams, powders, ointments over the puncture site for one week. You may place a clean band-aid over the puncture site each day for 5 days. Change this daily. Sedation for a Medical Procedure: Care Instructions  Your Care Instructions    For a minor procedure or surgery, you will get a sedative to help you relax. This drug will make you sleepy. It is usually given in a vein (by IV). A shot may also be used to numb the area. If you had local anesthesia, you may feel some pain and discomfort as it wears off. If you have pain, don't be afraid to say so. Pain medicine works better if you take it before the pain gets bad. Common side effects from sedation include:  · Feeling sleepy. (Your doctors and nurses will make sure you are not too sleepy to go home.)  · Nausea and vomiting. This usually does not last long. · Feeling tired. Follow-up care is a key part of your treatment and safety. Be sure to make and go to all appointments, and call your doctor if you are having problems. It's also a good idea to know your test results and keep a list of the medicines you take.   How can you care for yourself at home? Activity  ? · Don't do anything for 24 hours that requires attention to detail. It takes time for the medicine effects to completely wear off.   ? · For your safety, you should not drive or operate any machinery that could be dangerous until the medicine wears off and you can think clearly and react easily. ? · Rest when you feel tired. Getting enough sleep will help you recover. Diet  ? · You can eat your normal diet, unless your doctor gives you other instructions. If your stomach is upset, try clear liquids and bland, low-fat foods like plain toast or rice. ? · Drink plenty of fluids (unless your doctor tells you not to). ? · Don't drink alcohol for 24 hours. Medicines  ? · Be safe with medicines. Read and follow all instructions on the label. ¨ If the doctor gave you a prescription medicine for pain, take it as prescribed. ¨ If you are not taking a prescription pain medicine, ask your doctor if you can take an over-the-counter medicine. ? · If you think your pain medicine is making you sick to your stomach:  ¨ Take your medicine after meals (unless your doctor has told you not to). ¨ Ask your doctor for a different pain medicine. When should you call for help? Call 911 anytime you think you may need emergency care. For example, call if:  ? · You have severe trouble breathing. ? · You passed out (lost consciousness). ?Call your doctor now or seek immediate medical care if:  ? · You have trouble breathing. ? · You have ongoing or worsening nausea or vomiting. ? · You have a fever. ? · You have a new or worse headache. ? · The medicine is not wearing off and you can't think clearly. ? Watch closely for changes in your health, and be sure to contact your doctor if:  ? · You do not get better as expected. Where can you learn more? Go to http://ann-jose.info/.   Enter T768 in the search box to learn more about \"Sedation for a Medical Procedure: Care Instructions. \"  Current as of: August 14, 2016  Content Version: 11.4  © 1100-7508 [employer]. Care instructions adapted under license by [employer] (which disclaims liability or warranty for this information). If you have questions about a medical condition or this instruction, always ask your healthcare professional. [address] any warranty or liability for your use of this information. DISCHARGE SUMMARY from Nurse    PATIENT INSTRUCTIONS:    After general anesthesia or intravenous sedation, for 24 hours or while taking prescription Narcotics:  · Limit your activities  · Do not drive and operate hazardous machinery  · Do not make important personal or business decisions  · Do  not drink alcoholic beverages  · If you have not urinated within 8 hours after discharge, please contact your surgeon on call. Report the following to your surgeon:  · Excessive pain, swelling, redness or odor of or around the surgical area  · Temperature over 100.5  · Nausea and vomiting lasting longer than 4 hours or if unable to take medications  · Any signs of decreased circulation or nerve impairment to extremity: change in color, persistent  numbness, tingling, coldness or increase pain  · Any questions    What to do at Home:  Recommended activity: Activity as tolerated,     *  Please give a list of your current medications to your Primary Care Provider. *  Please update this list whenever your medications are discontinued, doses are      changed, or new medications (including over-the-counter products) are added. *  Please carry medication information at all times in case of emergency situations. These are general instructions for a healthy lifestyle:    No smoking/ No tobacco products/ Avoid exposure to second hand smoke  Surgeon General's Warning:  Quitting smoking now greatly reduces serious risk to your health.     Obesity, smoking, and sedentary lifestyle greatly increases your risk for illness    A healthy diet, regular physical exercise & weight monitoring are important for maintaining a healthy lifestyle    You may be retaining fluid if you have a history of heart failure or if you experience any of the following symptoms:  Weight gain of 3 pounds or more overnight or 5 pounds in a week, increased swelling in our hands or feet or shortness of breath while lying flat in bed. Please call your doctor as soon as you notice any of these symptoms; do not wait until your next office visit. Recognize signs and symptoms of STROKE:    F-face looks uneven    A-arms unable to move or move unevenly    S-speech slurred or non-existent    T-time-call 911 as soon as signs and symptoms begin-DO NOT go       Back to bed or wait to see if you get better-TIME IS BRAIN. Warning Signs of HEART ATTACK     Call 911 if you have these symptoms:   Chest discomfort. Most heart attacks involve discomfort in the center of the chest that lasts more than a few minutes, or that goes away and comes back. It can feel like uncomfortable pressure, squeezing, fullness, or pain.  Discomfort in other areas of the upper body. Symptoms can include pain or discomfort in one or both arms, the back, neck, jaw, or stomach.  Shortness of breath with or without chest discomfort.  Other signs may include breaking out in a cold sweat, nausea, or lightheadedness. Don't wait more than five minutes to call 911 - MINUTES MATTER! Fast action can save your life. Calling 911 is almost always the fastest way to get lifesaving treatment. Emergency Medical Services staff can begin treatment when they arrive -- up to an hour sooner than if someone gets to the hospital by car. The discharge information has been reviewed with the patient and caregiver. The patient and caregiver verbalized understanding.   Discharge medications reviewed with the patient and guardian and appropriate educational materials and side effects teaching were provided.   ___________________________________________________________________________________________________________________________________

## 2017-12-15 NOTE — DISCHARGE SUMMARY
Cardiology Discharge Summary     Patient ID:  Lola Cao  [id number]  31 y.o.  1935    Admit Date: 12/13/2017    Discharge Date: 12/15/2017     Admitting Physician: Lisbeth Chi MD     Discharge Physician: Lisbeth Chi MD    * Admission Diagnoses: care unit  CAD (coronary artery disease)  CAD (coronary artery disease)  CAD (coronary artery disease)    * Discharge Diagnoses:   Hospital Problems as of 12/15/2017  Date Reviewed: 12/15/2017          Codes Class Noted - Resolved POA    CAD (coronary artery disease) ICD-10-CM: I25.10  ICD-9-CM: 414.00  12/13/2017 - Present Unknown              * Discharge Condition: good    * Cardiology Procedures this Admission:  Left heart catheterization with PCI  Cardiology and IR Orders (Through next 24h)    Start     Ordered    12/14/17 0730  [facility] CATH LAB PROC  [[id number]]  ONE TIME      12/14/17 0726    12/14/17 0000  CARDIAC CATHETERIZATION  [[id number]]  ONE TIME      12/13/17 0920    12/13/17 0845  [facility] CATH LAB PROC  [[id number]]  ONE TIME      12/13/17 0832    12/13/17 0800  CARDIAC CATHETERIZATION  [[id number]]  ONE TIME      12/12/17 1053          * Hospital Course: Had to remove sheath before procedure because of kink in sheath probably caused by entering a side branch. Kept in hospital for groin issues. Cath yesterday with PCI to LAD and Diag. Stable overnight. Groin and pulses are okay. Consults: None    Discharge Exam:     Visit Vitals    /60 (BP 1 Location: Left arm, BP Patient Position: At rest)    Pulse (!) 49    Temp 98.1 °F (36.7 °C)    Resp 16    Ht 5' 10.5\" (1.791 m)    Wt 112.9 kg (248 lb 14.4 oz)    SpO2 93%    BMI 35.21 kg/m2     General Appearance:  Well developed, well nourished,alert and oriented x 3, and individual in no acute distress. Ears/Nose/Mouth/Throat:   Hearing grossly normal.         Neck: Supple. Chest:   Lungs clear to auscultation bilaterally. Cardiovascular:  Regular rate and rhythm, S1, S2 normal, no murmur. Abdomen:   Soft, non-tender, bowel sounds are active. Extremities: No edema bilaterally. Skin: Warm and dry. * Disposition: Home    Discharge Medications: Current Discharge Medication List      CONTINUE these medications which have NOT CHANGED    Details   aspirin delayed-release 81 mg tablet Take 81 mg by mouth daily. clopidogrel (PLAVIX) 75 mg tab Take 75 mg by mouth. ibuprofen (MOTRIN) 200 mg tablet Take 400 mg by mouth every six (6) hours as needed for Pain.      metoprolol succinate (TOPROL XL) 50 mg XL tablet Take 1 Tab by mouth daily. Qty: 30 Tab, Refills: 6      mirabegron ER (MYRBETRIQ) 25 mg ER tablet Take 50 mg by mouth daily. hydrOXYzine HCl (ATARAX) 10 mg tablet Take 10 mg by mouth nightly. VESICARE 10 mg tablet TAKE 1 TABLET DAILY  Qty: 90 Tab, Refills: 0      simvastatin (ZOCOR) 20 mg tablet Take  by mouth nightly. Associated Diagnoses: Malignant neoplasm of prostate (HCC)      telmisartan (MICARDIS) 20 mg tablet Take  by mouth daily. Associated Diagnoses: Malignant neoplasm of prostate ([address]             * Follow-up Care/Patient Instructions: Activity:Activity as tolerated  Diet: Cardiac Diet  Wound Care: Keep wound clean and dry    Follow-up Information     Follow up With Details Comments Contact Info    Mark Yates MD  Physician's office is closed today. Please call Monday morning to schedule a follow-up appointment.  [address] [facility]  [address] [place] [address],    [id number] [facility]  [phone]            Signed:  Mark Yates MD  12/15/2017  10:28 AM

## 2017-12-15 NOTE — PROGRESS NOTES
Chart Reviewed. Noted patient admitted to the hospital for further medical management. Care Management to follow up with patient and/or family for transition of care needs prn. Readmission Risk Assessment: Low Risk and MSSP/Good Help ACO patients    RRAT Score: 1 - 12    Initial Assessment:    Emergency Contact: see face sheet    Pertinent Medical Hx: Gout, arthiriitis, prostate cancer, anxiety    PCP/Specialists: NIKKI Dinero    Community Services:     DME:     Low Risk Care Transition Plan:  1. Evaluate for [facility] or [address] coordination of resources  2. Involve patient/caregiver in assessment, planning, education and implement of intervention. 3. CM daily patient care huddles/interdisciplinary rounds. 4. PCP/Specialist appointment within 7 - 10 days made prior to discharge. 5. Facilitate transportation and logistics for follow-up appointments.   6. Handoff to [address] Nurse Navigator or PCP practice

## 2017-12-15 NOTE — PROGRESS NOTES
D/c plan: Home today  Met with patient at bedside and Dr. Thony Rosenbaum. Patient states he is ready for d/c, Dr. Thony Rosenbaum concurs. Patient states he lives with his wife, states he still drives. He denies needs or other dme equipment at this time. Dr. Thony Rosenbaum informed patient to call his office for follow up appointment. His office is closed today. Care Management Interventions  PCP Verified by CM:  Yes  Transition of Care Consult (CM Consult): Discharge Planning  Current Support Network: Lives with Spouse  Confirm Follow Up Transport: Family  Plan discussed with Pt/Family/Caregiver: Yes  Freedom of Choice Offered: Yes  Discharge Location  Discharge Placement: Home with family assistance

## 2017-12-15 NOTE — PROGRESS NOTES
Problem: Falls - Risk of  Goal: *Absence of Falls  Document Chelsie Fall Risk and appropriate interventions in the flowsheet.    Outcome: Progressing Towards Goal  Fall Risk Interventions:            Medication Interventions: Bed/chair exit alarm, Patient to call before getting OOB, Teach patient to arise slowly         History of Falls Interventions: Bed/chair exit alarm, Room close to nurse's station, Investigate reason for fall

## 2017-12-15 NOTE — ROUTINE PROCESS
Dual AVS reviewed with ANJELICA. [address] [place]. All medications reviewed individually with patient. Opportunities for questions and concerns provided. Patient discharged via (mode of transport ie. Car, ambulance or air transport) car with daughter. Patient's arm band appropriately discarded.

## 2017-12-16 NOTE — PROCEDURES
[facility] CATH    Mak Gui  MR#: [id number]  : 1935  ACCOUNT #: [de-identified]   DATE OF SERVICE: 2017    PREOPERATIVE DIAGNOSIS: Coronary artery disease. POSTOPERATIVE DIAGNOSIS: Coronary artery disease. PROCEDURES PERFORMED:   1. Left heart catheterization. 2. Coronary angiogram.  3. Percutaneous coronary intervention. 4. Intravascular ultrasound or IVUS. 5. Stent deployment left anterior descending coronary artery. 6. Angioplasty diagonal coronary artery. CODES: 80110 with 26 modifier, [id number] and [id number] with a 26 modifier, 90789 for conscious sedation first 15 minutes, and 99153 x6 for each additional 15 minutes of conscious sedation for the procedure. SURGEON: Kim Davila MD     BRIEF CLINICAL HISTORY: The patient is an [de-identified] man who had a catheterization some time ago showing triple vessel coronary disease. He was referred to surgery, but they had felt that surgery was not suitable and recommended coronary intervention on the vessels that could be intervened on. Accordingly, he is brought in at this time for catheterization and intervention on his LAD and diagonal.     TECHNIQUE: The patient was brought to the catheterization laboratory and placed on the table in the usual fashion. Right groin was prepped and draped. After lidocaine local anesthesia and conscious sedation, a 7-Setswana sheath was placed in the right femoral artery using micropuncture technique. Next coronary angiograms were obtained. Next, a guide was introduced. We used an EBU 3.75 guide. After Angiomax bolus, BMW guidewire was used to wire the LAD. Next a Luge wire was used to wire the diagonal. The diagonal was then dilated with a 2.0 x 12 balloon. We attempted a 2 mm to 6 mm cutting balloon. That was unsuccessful. We were unable to deploy a stent in the diagonal because of calcium spicules. We then dilated the diagonal with a 2.5 x 12 mm balloon.  Next, the LAD was dilated with a 3.0 balloon. Subsequently, IVUS was introduced and the LAD was visualized with the IVUS system. We chose a 3.5 x 16 mm stent. The stent was then deployed. There was a residual stenosis in the stent where the original stenosis was. We post-dilated that with an NC TREK noncompliant balloon up to 17 atmospheres which resulted in full deployment of the stent in that section. Post-stent deployment angiogram revealed an open stent site with no residual stenosis and no dissection. The diagonal remained patent after the angioplasty, but there was still a narrowing at its origin. Next, the guidewire and balloon were removed. Angiogram was performed through the sheath. The artery was sufficiently large to use a closure device and the sheath entered above the bifurcation. The patient was then reprepped, redraped, and we changed gloves. Angio-Seal was deployed. Hemostasis was assured. A dry sterile dressing was applied, and patient returned to recovery in satisfactory condition having tolerated the procedure well. There were no complications. RESULTS   1. Triple-vessel coronary artery disease. 2. Successful intervention in the left anterior descending with deployment of a 3.5 x 16 mm [employer] drug-eluting stenting without residual stenosis or dissection. PLAN: Continue medical followup. Consider further intervention in the future.       MD Cj Perez  D: 12/14/2017 10:45     T: 12/16/2017 16:40  JOB #: [id number]

## 2018-01-09 RX ORDER — ALPRAZOLAM 0.5 MG/1
0.5 TABLET ORAL
COMMUNITY
End: 2018-03-13

## 2018-01-10 ENCOUNTER — HOSPITAL ENCOUNTER (OUTPATIENT)
Dept: CARDIAC CATH/INVASIVE PROCEDURES | Age: 83
Setting detail: OBSERVATION
Discharge: HOME | End: 2018-01-11
Attending: INTERNAL MEDICINE | Admitting: INTERNAL MEDICINE
Payer: MEDICARE

## 2018-01-10 DIAGNOSIS — F41.9 ANXIETY: Primary | ICD-10-CM

## 2018-01-10 PROBLEM — Z95.5 STENTED CORONARY ARTERY: Status: ACTIVE | Noted: 2018-01-10

## 2018-01-10 LAB
ANION GAP SERPL CALC-SCNC: 9 MMOL/L (ref 3–18)
BUN SERPL-MCNC: 19 MG/DL (ref 7–18)
BUN/CREAT SERPL: 19 (ref 12–20)
CALCIUM SERPL-MCNC: 8.9 MG/DL (ref 8.5–10.1)
CHLORIDE SERPL-SCNC: 105 MMOL/L (ref 100–108)
CO2 SERPL-SCNC: 28 MMOL/L (ref 21–32)
CREAT SERPL-MCNC: 1 MG/DL (ref 0.6–1.3)
ERYTHROCYTE [DISTWIDTH] IN BLOOD BY AUTOMATED COUNT: 15.1 % (ref 11.6–14.5)
GLUCOSE BLD STRIP.AUTO-MCNC: 125 MG/DL (ref 70–110)
GLUCOSE SERPL-MCNC: 101 MG/DL (ref 74–99)
HCT VFR BLD AUTO: 43.3 % (ref 36–48)
HGB BLD-MCNC: 13.9 G/DL (ref 13–16)
INR PPP: 1.1 (ref 0.8–1.2)
MCH RBC QN AUTO: 29.1 PG (ref 24–34)
MCHC RBC AUTO-ENTMCNC: 32.1 G/DL (ref 31–37)
MCV RBC AUTO: 90.6 FL (ref 74–97)
PLATELET # BLD AUTO: 172 K/UL (ref 135–420)
PMV BLD AUTO: 11.8 FL (ref 9.2–11.8)
POTASSIUM SERPL-SCNC: 4.9 MMOL/L (ref 3.5–5.5)
PROTHROMBIN TIME: 13.7 SEC (ref 11.5–15.2)
RBC # BLD AUTO: 4.78 M/UL (ref 4.7–5.5)
SODIUM SERPL-SCNC: 142 MMOL/L (ref 136–145)
WBC # BLD AUTO: 6.6 K/UL (ref 4.6–13.2)

## 2018-01-10 PROCEDURE — 93005 ELECTROCARDIOGRAM TRACING: CPT

## 2018-01-10 PROCEDURE — 74011000250 HC RX REV CODE- 250: Performed by: INTERNAL MEDICINE

## 2018-01-10 PROCEDURE — 74011250636 HC RX REV CODE- 250/636

## 2018-01-10 PROCEDURE — 74011250636 HC RX REV CODE- 250/636: Performed by: INTERNAL MEDICINE

## 2018-01-10 PROCEDURE — 74011000250 HC RX REV CODE- 250

## 2018-01-10 PROCEDURE — 85027 COMPLETE CBC AUTOMATED: CPT | Performed by: INTERNAL MEDICINE

## 2018-01-10 PROCEDURE — 82962 GLUCOSE BLOOD TEST: CPT

## 2018-01-10 PROCEDURE — 74011636320 HC RX REV CODE- 636/320: Performed by: INTERNAL MEDICINE

## 2018-01-10 PROCEDURE — 99218 HC RM OBSERVATION: CPT

## 2018-01-10 PROCEDURE — 74011000258 HC RX REV CODE- 258: Performed by: INTERNAL MEDICINE

## 2018-01-10 PROCEDURE — 74011250637 HC RX REV CODE- 250/637: Performed by: INTERNAL MEDICINE

## 2018-01-10 PROCEDURE — 93571 IV DOP VEL&/PRESS C FLO 1ST: CPT

## 2018-01-10 PROCEDURE — 74011250637 HC RX REV CODE- 250/637

## 2018-01-10 PROCEDURE — 85610 PROTHROMBIN TIME: CPT | Performed by: INTERNAL MEDICINE

## 2018-01-10 PROCEDURE — 80048 BASIC METABOLIC PNL TOTAL CA: CPT | Performed by: INTERNAL MEDICINE

## 2018-01-10 RX ORDER — ATROPINE SULFATE 0.1 MG/ML
0.5 INJECTION INTRAVENOUS AS NEEDED
Status: DISCONTINUED | OUTPATIENT
Start: 2018-01-10 | End: 2018-01-11 | Stop reason: HOSPADM

## 2018-01-10 RX ORDER — HEPARIN SODIUM 1000 [USP'U]/ML
4000 INJECTION, SOLUTION INTRAVENOUS; SUBCUTANEOUS ONCE
Status: DISCONTINUED | OUTPATIENT
Start: 2018-01-10 | End: 2018-01-10 | Stop reason: HOSPADM

## 2018-01-10 RX ORDER — FENTANYL CITRATE 50 UG/ML
INJECTION, SOLUTION INTRAMUSCULAR; INTRAVENOUS
Status: COMPLETED
Start: 2018-01-10 | End: 2018-01-10

## 2018-01-10 RX ORDER — HEPARIN SODIUM 200 [USP'U]/100ML
500 INJECTION, SOLUTION INTRAVENOUS
Status: DISCONTINUED | OUTPATIENT
Start: 2018-01-10 | End: 2018-01-10 | Stop reason: HOSPADM

## 2018-01-10 RX ORDER — MIDAZOLAM HYDROCHLORIDE 1 MG/ML
.5-2 INJECTION, SOLUTION INTRAMUSCULAR; INTRAVENOUS
Status: DISCONTINUED | OUTPATIENT
Start: 2018-01-10 | End: 2018-01-10 | Stop reason: HOSPADM

## 2018-01-10 RX ORDER — SODIUM CHLORIDE 9 MG/ML
50 INJECTION, SOLUTION INTRAVENOUS CONTINUOUS
Status: DISCONTINUED | OUTPATIENT
Start: 2018-01-10 | End: 2018-01-11 | Stop reason: HOSPADM

## 2018-01-10 RX ORDER — SODIUM CHLORIDE 0.9 % (FLUSH) 0.9 %
5-10 SYRINGE (ML) INJECTION AS NEEDED
Status: DISCONTINUED | OUTPATIENT
Start: 2018-01-10 | End: 2018-01-11 | Stop reason: HOSPADM

## 2018-01-10 RX ORDER — CLOPIDOGREL BISULFATE 75 MG/1
TABLET ORAL
Status: COMPLETED
Start: 2018-01-10 | End: 2018-01-10

## 2018-01-10 RX ORDER — NITROGLYCERIN 5 MG/ML
INJECTION, SOLUTION INTRAVENOUS
Status: DISPENSED
Start: 2018-01-10 | End: 2018-01-10

## 2018-01-10 RX ORDER — SODIUM CHLORIDE 900 MG/100ML
INJECTION INTRAVENOUS
Status: DISPENSED
Start: 2018-01-10 | End: 2018-01-10

## 2018-01-10 RX ORDER — BIVALIRUDIN 250 MG/5ML
0.75 INJECTION, POWDER, LYOPHILIZED, FOR SOLUTION INTRAVENOUS ONCE
Status: DISCONTINUED | OUTPATIENT
Start: 2018-01-10 | End: 2018-01-10 | Stop reason: HOSPADM

## 2018-01-10 RX ORDER — CLOPIDOGREL BISULFATE 75 MG/1
75 TABLET ORAL DAILY
Status: DISCONTINUED | OUTPATIENT
Start: 2018-01-10 | End: 2018-01-11 | Stop reason: HOSPADM

## 2018-01-10 RX ORDER — TELMISARTAN 40 MG/1
20 TABLET ORAL DAILY
Status: DISCONTINUED | OUTPATIENT
Start: 2018-01-10 | End: 2018-01-11 | Stop reason: HOSPADM

## 2018-01-10 RX ORDER — LIDOCAINE HYDROCHLORIDE 10 MG/ML
3-20 INJECTION INFILTRATION; PERINEURAL
Status: DISCONTINUED | OUTPATIENT
Start: 2018-01-10 | End: 2018-01-10 | Stop reason: HOSPADM

## 2018-01-10 RX ORDER — MIDAZOLAM HYDROCHLORIDE 1 MG/ML
INJECTION, SOLUTION INTRAMUSCULAR; INTRAVENOUS
Status: COMPLETED
Start: 2018-01-10 | End: 2018-01-10

## 2018-01-10 RX ORDER — CLOPIDOGREL BISULFATE 75 MG/1
150 TABLET ORAL ONCE
Status: COMPLETED | OUTPATIENT
Start: 2018-01-10 | End: 2018-01-10

## 2018-01-10 RX ORDER — FENTANYL CITRATE 50 UG/ML
25-100 INJECTION, SOLUTION INTRAMUSCULAR; INTRAVENOUS
Status: DISCONTINUED | OUTPATIENT
Start: 2018-01-10 | End: 2018-01-10 | Stop reason: HOSPADM

## 2018-01-10 RX ORDER — SIMVASTATIN 20 MG/1
20 TABLET, FILM COATED ORAL
Status: DISCONTINUED | OUTPATIENT
Start: 2018-01-10 | End: 2018-01-11 | Stop reason: HOSPADM

## 2018-01-10 RX ORDER — ALPRAZOLAM 0.5 MG/1
0.5 TABLET ORAL
Status: DISCONTINUED | OUTPATIENT
Start: 2018-01-10 | End: 2018-01-11 | Stop reason: HOSPADM

## 2018-01-10 RX ORDER — HEPARIN SODIUM 1000 [USP'U]/ML
4000 INJECTION, SOLUTION INTRAVENOUS; SUBCUTANEOUS
Status: DISCONTINUED | OUTPATIENT
Start: 2018-01-10 | End: 2018-01-10 | Stop reason: HOSPADM

## 2018-01-10 RX ORDER — HYDROMORPHONE HYDROCHLORIDE 1 MG/ML
0.5 INJECTION, SOLUTION INTRAMUSCULAR; INTRAVENOUS; SUBCUTANEOUS ONCE
Status: COMPLETED | OUTPATIENT
Start: 2018-01-10 | End: 2018-01-10

## 2018-01-10 RX ORDER — HYDROXYZINE HYDROCHLORIDE 10 MG/1
10 TABLET, FILM COATED ORAL
Status: DISCONTINUED | OUTPATIENT
Start: 2018-01-10 | End: 2018-01-11 | Stop reason: HOSPADM

## 2018-01-10 RX ORDER — METOPROLOL SUCCINATE 50 MG/1
50 TABLET, EXTENDED RELEASE ORAL DAILY
Status: DISCONTINUED | OUTPATIENT
Start: 2018-01-10 | End: 2018-01-11 | Stop reason: HOSPADM

## 2018-01-10 RX ORDER — ACETAMINOPHEN 325 MG/1
650 TABLET ORAL
Status: DISCONTINUED | OUTPATIENT
Start: 2018-01-10 | End: 2018-01-11 | Stop reason: HOSPADM

## 2018-01-10 RX ORDER — ASPIRIN 81 MG/1
81 TABLET ORAL DAILY
Status: DISCONTINUED | OUTPATIENT
Start: 2018-01-10 | End: 2018-01-11 | Stop reason: HOSPADM

## 2018-01-10 RX ORDER — SOLIFENACIN SUCCINATE 5 MG/1
10 TABLET, FILM COATED ORAL
Status: DISCONTINUED | OUTPATIENT
Start: 2018-01-10 | End: 2018-01-11 | Stop reason: HOSPADM

## 2018-01-10 RX ORDER — LIDOCAINE HYDROCHLORIDE 10 MG/ML
10-30 INJECTION INFILTRATION; PERINEURAL
Status: DISCONTINUED | OUTPATIENT
Start: 2018-01-10 | End: 2018-01-10 | Stop reason: HOSPADM

## 2018-01-10 RX ORDER — HEPARIN SODIUM 200 [USP'U]/100ML
INJECTION, SOLUTION INTRAVENOUS
Status: COMPLETED
Start: 2018-01-10 | End: 2018-01-10

## 2018-01-10 RX ORDER — LORAZEPAM 2 MG/ML
INJECTION INTRAMUSCULAR
Status: DISPENSED
Start: 2018-01-10 | End: 2018-01-10

## 2018-01-10 RX ORDER — LORAZEPAM 2 MG/ML
1 INJECTION INTRAMUSCULAR ONCE
Status: DISCONTINUED | OUTPATIENT
Start: 2018-01-10 | End: 2018-01-10 | Stop reason: HOSPADM

## 2018-01-10 RX ORDER — BIVALIRUDIN 250 MG/5ML
INJECTION, POWDER, LYOPHILIZED, FOR SOLUTION INTRAVENOUS
Status: COMPLETED
Start: 2018-01-10 | End: 2018-01-10

## 2018-01-10 RX ORDER — SODIUM CHLORIDE 0.9 % (FLUSH) 0.9 %
5-10 SYRINGE (ML) INJECTION EVERY 8 HOURS
Status: DISCONTINUED | OUTPATIENT
Start: 2018-01-10 | End: 2018-01-11 | Stop reason: HOSPADM

## 2018-01-10 RX ORDER — LIDOCAINE HYDROCHLORIDE 10 MG/ML
INJECTION INFILTRATION; PERINEURAL
Status: COMPLETED
Start: 2018-01-10 | End: 2018-01-10

## 2018-01-10 RX ORDER — NITROGLYCERIN 400 UG/1
1 SPRAY ORAL
Status: DISCONTINUED | OUTPATIENT
Start: 2018-01-10 | End: 2018-01-10 | Stop reason: ALTCHOICE

## 2018-01-10 RX ORDER — NITROGLYCERIN 0.4 MG/1
0.4 TABLET SUBLINGUAL AS NEEDED
Status: DISCONTINUED | OUTPATIENT
Start: 2018-01-10 | End: 2018-01-11 | Stop reason: HOSPADM

## 2018-01-10 RX ORDER — BIVALIRUDIN 250 MG/5ML
INJECTION, POWDER, LYOPHILIZED, FOR SOLUTION INTRAVENOUS
Status: DISPENSED
Start: 2018-01-10 | End: 2018-01-10

## 2018-01-10 RX ORDER — HEPARIN SODIUM 1000 [USP'U]/ML
INJECTION, SOLUTION INTRAVENOUS; SUBCUTANEOUS
Status: DISPENSED
Start: 2018-01-10 | End: 2018-01-10

## 2018-01-10 RX ADMIN — LIDOCAINE HYDROCHLORIDE 10 ML: 10 INJECTION, SOLUTION INFILTRATION; PERINEURAL at 08:50

## 2018-01-10 RX ADMIN — BIVALIRUDIN 1.75 MG/KG/HR: 250 INJECTION, POWDER, LYOPHILIZED, FOR SOLUTION INTRAVENOUS at 09:14

## 2018-01-10 RX ADMIN — HEPARIN SODIUM 1000 UNITS: 200 INJECTION, SOLUTION INTRAVENOUS at 09:16

## 2018-01-10 RX ADMIN — BIVALIRUDIN 1.75 MG/KG/HR: 250 INJECTION, POWDER, LYOPHILIZED, FOR SOLUTION INTRAVENOUS at 09:05

## 2018-01-10 RX ADMIN — Medication 10 ML: at 21:25

## 2018-01-10 RX ADMIN — HYDROXYZINE HYDROCHLORIDE 10 MG: 10 TABLET ORAL at 21:25

## 2018-01-10 RX ADMIN — ASPIRIN 81 MG: 81 TABLET, COATED ORAL at 14:14

## 2018-01-10 RX ADMIN — FENTANYL CITRATE 100 MCG: 50 INJECTION, SOLUTION INTRAMUSCULAR; INTRAVENOUS at 08:55

## 2018-01-10 RX ADMIN — SOLIFENACIN SUCCINATE 10 MG: 5 TABLET, FILM COATED ORAL at 21:24

## 2018-01-10 RX ADMIN — Medication 10 ML: at 14:00

## 2018-01-10 RX ADMIN — SODIUM CHLORIDE 50 ML/HR: 900 INJECTION, SOLUTION INTRAVENOUS at 10:57

## 2018-01-10 RX ADMIN — IOPAMIDOL 110 ML: 510 INJECTION, SOLUTION INTRAVASCULAR at 09:38

## 2018-01-10 RX ADMIN — MIRABEGRON 50 MG: 25 TABLET, FILM COATED, EXTENDED RELEASE ORAL at 14:16

## 2018-01-10 RX ADMIN — MIDAZOLAM HYDROCHLORIDE 2 MG: 1 INJECTION, SOLUTION INTRAMUSCULAR; INTRAVENOUS at 08:54

## 2018-01-10 RX ADMIN — HEPARIN SODIUM 1000 UNITS: 200 INJECTION, SOLUTION INTRAVENOUS at 09:00

## 2018-01-10 RX ADMIN — CLOPIDOGREL BISULFATE 150 MG: 75 TABLET ORAL at 09:44

## 2018-01-10 RX ADMIN — SODIUM CHLORIDE 12.5 MG: 9 INJECTION INTRAMUSCULAR; INTRAVENOUS; SUBCUTANEOUS at 12:28

## 2018-01-10 RX ADMIN — SIMVASTATIN 20 MG: 20 TABLET, FILM COATED ORAL at 21:24

## 2018-01-10 RX ADMIN — NITROGLYCERIN 0.4 MG: 0.4 TABLET SUBLINGUAL at 10:33

## 2018-01-10 RX ADMIN — TELMISARTAN 20 MG: 40 TABLET ORAL at 14:14

## 2018-01-10 RX ADMIN — SODIUM CHLORIDE 50 ML/HR: 900 INJECTION, SOLUTION INTRAVENOUS at 07:39

## 2018-01-10 RX ADMIN — HYDROMORPHONE HYDROCHLORIDE 0.5 MG: 1 INJECTION, SOLUTION INTRAMUSCULAR; INTRAVENOUS; SUBCUTANEOUS at 10:31

## 2018-01-10 RX ADMIN — CLOPIDOGREL BISULFATE 75 MG: 75 TABLET ORAL at 14:14

## 2018-01-10 RX ADMIN — BIVALIRUDIN 85 MG: 250 INJECTION, POWDER, LYOPHILIZED, FOR SOLUTION INTRAVENOUS at 09:05

## 2018-01-10 RX ADMIN — METOPROLOL SUCCINATE 50 MG: 50 TABLET, EXTENDED RELEASE ORAL at 14:14

## 2018-01-10 RX ADMIN — ACETAMINOPHEN 650 MG: 325 TABLET ORAL at 18:25

## 2018-01-10 RX ADMIN — LIDOCAINE HYDROCHLORIDE 10 ML: 10 INJECTION INFILTRATION; PERINEURAL at 08:50

## 2018-01-10 RX ADMIN — SODIUM CHLORIDE 50 ML/HR: 900 INJECTION, SOLUTION INTRAVENOUS at 09:05

## 2018-01-10 NOTE — ROUTINE PROCESS
Cardiac Cath Lab:  Pre Procedure Chart Check     Patients chart was accessed and reviewed for possible and/or scheduled procedure. Creatinine Clearance:  CREATININE: 1 MG/DL (01/10/18 0724)  Estimated creatinine clearance: 74.2 mL/min    Total Contrast  Load:  3 x estimated clearance amount=  222.6 ml    75% of Contrast Load:  0.75 x Total Contrast Load=   166.9 ml    Recent Labs      01/10/18   0724   WBC  6.6   RBC  4.78   HCT  43.3   HGB  13.9   PLT  172   INR  1.1   PTP  13.7   NA  142   K  4.9   BUN  19*   CREA  1.00   GFRAA  >60   GFRNA  >60   CA  8.9       BMI: Body mass index is 35.74 kg/(m^2).     ALLERGIES: No Known Allergies    Lines:        Peripheral IV 01/10/18 Right Hand (Active)   Site Assessment Clean, dry, & intact 1/10/2018  7:39 AM   Phlebitis Assessment 0 1/10/2018  7:39 AM   Infiltration Assessment 0 1/10/2018  7:39 AM   Dressing Status Clean, dry, & intact 1/10/2018  7:39 AM   Dressing Type Transparent 1/10/2018  7:39 AM   Hub Color/Line Status Pink 1/10/2018  7:39 AM   Action Taken Blood drawn 1/10/2018  7:39 AM   Alcohol Cap Used Yes 1/10/2018  7:39 AM          History:    Past Medical History:   Diagnosis Date    CAP (community acquired pneumonia)     DI (detrusor instability)     Erectile dysfunction     Prostate [facility] ([facility] [address]     Urgency of urination     Urinary frequency      Past Surgical History:   Procedure Laterality Date    HX CHOLECYSTECTOMY      HX HERNIA REPAIR      HX KNEE REPLACEMENT      bi-late    HX SHOULDER REPLACEMENT       Patient Active Problem List   Diagnosis Code    Malignant neoplasm of prostate ([facility] [address] C61    CAD (coronary artery disease) I25.10

## 2018-01-10 NOTE — PROGRESS NOTES
Left message to call back.  ROXANNE Sanchez     Pt transferred to room 357. Had large emesis upon completing transfer from stretcher to bed. Continues to deny any pain. R groin site assessed with receiving RN and is wnl.

## 2018-01-10 NOTE — PROGRESS NOTES
1022: Pt back on unit from procedure. Awake and alert and c/o tightness in chest and arm. Dr Maximus Cadet notified and order received for prn dilaudid and nitroglycerin. 1040: EKG completed and prn meds administered. Pt reports some relief and is resting quietly with eyes closed. Family at bedside. 1130: TRANSFER - OUT REPORT:    Verbal report given to CHRISTOS Han on Ifeanyi Albarran  being transferred to [employer] for routine progression of care       Report consisted of patients Situation, Background, Assessment and   Recommendations(SBAR). Information from the following report(s) Procedure Summary, Intake/Output, MAR and Cardiac Rhythm Sinus Luisa Razo was reviewed with the receiving nurse. Lines:   Peripheral IV 01/10/18 Right Hand (Active)   Site Assessment Clean, dry, & intact 1/10/2018 12:37 PM   Phlebitis Assessment 0 1/10/2018 12:37 PM   Infiltration Assessment 0 1/10/2018 12:37 PM   Dressing Status Clean, dry, & intact 1/10/2018 12:37 PM   Dressing Type Transparent 1/10/2018 12:37 PM   Hub Color/Line Status Infusing 1/10/2018 12:37 PM   Action Taken Blood drawn 1/10/2018  7:39 AM   Alcohol Cap Used Yes 1/10/2018 12:37 PM        Opportunity for questions and clarification was provided.       Patient transported with:   Monitor  Registered Nurse

## 2018-01-10 NOTE — H&P
Date of Surgery Update:  Bruce Lane was seen and examined. History and physical has been reviewed. The patient has been examined. There have been no significant clinical changes since the completion of the originally dated History and Physical. Plan cath with moderate sedation.      Signed By: Jhonny Troncoso MD     January 10, 2018 8:34 AM

## 2018-01-10 NOTE — IP AVS SNAPSHOT
[address] [place] [id number] 
[phone] Patient: Martir Myers MRN: [id number] MBV:42/40/5257 A check elisabeth indicates which time of day the medication should be taken. My Medications CHANGE how you take these medications Instructions Each Dose to Equal  
 Morning Noon Evening Bedtime * XANAX 0.5 mg tablet Generic drug:  ALPRAZolam  
What changed:  Another medication with the same name was added. Make sure you understand how and when to take each. Take 0.5 mg by mouth two (2) times daily as needed for Anxiety. 0.5 mg  
    
   
   
   
  
 * ALPRAZolam 0.5 mg tablet Commonly known as:  Ivonne Kaur What changed: You were already taking a medication with the same name, and this prescription was added. Make sure you understand how and when to take each. Take 1 Tab by mouth two (2) times daily as needed for Anxiety for up to 10 days. Indications: anxiety 0.5 mg  
    
   
   
   
  
 * Notice: This list has 2 medication(s) that are the same as other medications prescribed for you. Read the directions carefully, and ask your doctor or other care provider to review them with you. CONTINUE taking these medications Instructions Each Dose to Equal  
 Morning Noon Evening Bedtime  
 aspirin delayed-release 81 mg tablet Your next dose is:  Tomorrow morning Take 81 mg by mouth daily. 81 mg  
    
  
   
   
   
  
 hydrOXYzine HCl 10 mg tablet Commonly known as:  ATARAX Your next dose is: Tonight Take 10 mg by mouth nightly. 10 mg  
    
   
   
  
   
  
 metoprolol succinate 50 mg XL tablet Commonly known as:  TOPROL XL Your next dose is:  Tomorrow morning Take 1 Tab by mouth daily. 50 mg  
    
  
   
   
   
  
 MICARDIS 20 mg tablet Generic drug:  telmisartan Your next dose is:  Tomorrow morning Take  by mouth daily. MYRBETRIQ 25 mg ER tablet Generic drug:  mirabegron ER Your next dose is:  Tomorrow morning Take 50 mg by mouth daily. 50 mg  
    
  
   
   
   
  
 PLAVIX 75 mg Tab Generic drug:  clopidogrel Take 75 mg by mouth. 75 mg  
    
   
   
   
  
 VESIcare 10 mg tablet Generic drug:  solifenacin TAKE 1 TABLET DAILY ZOCOR 20 mg tablet Generic drug:  simvastatin Your next dose is: Tonight Take  by mouth nightly. ASK your doctor about these medications Instructions Each Dose to Equal  
 Morning Noon Evening Bedtime  
 ibuprofen 200 mg tablet Commonly known as:  MOTRIN Take 400 mg by mouth every six (6) hours as needed for Pain. 400 mg Where to Get Your Medications Information on where to get these meds will be given to you by the nurse or doctor. ! Ask your nurse or doctor about these medications ALPRAZolam 0.5 mg tablet

## 2018-01-10 NOTE — IP AVS SNAPSHOT
[facility] 
 
 
 [address] [facility] [address] 
[phone] Patient: Garret Mathew MRN: [id number] SHRAVAN:[id number]/36/[id number] About your hospitalization You were admitted on:  January 10, 2018 You last received care in the:  [address] [place] [address] You were discharged on:  January 11, 2018 Why you were hospitalized Your primary diagnosis was:  Not on File Your diagnoses also included:  Stented Coronary Artery, Cad (Coronary Artery Disease) Follow-up Information Follow up With Details Comments Contact Info Jb Noe NP   [phone] 
[phone] Amarjit Rubio MD Schedule an appointment as soon as possible for a visit in 3 days  [phone] [address] 
[phone] Patient prefers to schedule his own follow-up appointments. Your Scheduled Appointments Tuesday February 13, 2018  8:30 AM EST  
ESTABLISHED PATIENT with Bel Ceballos MD  
[facility] ([facility]) [facility] [address] [place] [address] [address] [place] [address]  
[phone] Discharge Orders None A check elisabeth indicates which time of day the medication should be taken. My Medications CHANGE how you take these medications Instructions Each Dose to Equal  
 Morning Noon Evening Bedtime * XANAX 0.5 mg tablet Generic drug:  ALPRAZolam  
What changed:  Another medication with the same name was added. Make sure you understand how and when to take each. Take 0.5 mg by mouth two (2) times daily as needed for Anxiety. 0.5 mg  
    
   
   
   
  
 * ALPRAZolam 0.5 mg tablet Commonly known as:  Macy Young What changed: You were already taking a medication with the same name, and this prescription was added. Make sure you understand how and when to take each. Take 1 Tab by mouth two (2) times daily as needed for Anxiety for up to 10 days. Indications: anxiety 0.5 mg  
    
   
   
   
  
 * Notice: This list has 2 medication(s) that are the same as other medications prescribed for you. Read the directions carefully, and ask your doctor or other care provider to review them with you. CONTINUE taking these medications Instructions Each Dose to Equal  
 Morning Noon Evening Bedtime  
 aspirin delayed-release 81 mg tablet Your next dose is:  Tomorrow morning Take 81 mg by mouth daily. 81 mg  
    
  
   
   
   
  
 hydrOXYzine HCl 10 mg tablet Commonly known as:  ATARAX Your next dose is: Tonight Take 10 mg by mouth nightly. 10 mg  
    
   
   
  
   
  
 metoprolol succinate 50 mg XL tablet Commonly known as:  TOPROL XL Your next dose is:  Tomorrow morning Take 1 Tab by mouth daily. 50 mg  
    
  
   
   
   
  
 MICARDIS 20 mg tablet Generic drug:  telmisartan Your next dose is:  Tomorrow morning Take  by mouth daily. MYRBETRIQ 25 mg ER tablet Generic drug:  mirabegron ER Your next dose is:  Tomorrow morning Take 50 mg by mouth daily. 50 mg  
    
  
   
   
   
  
 PLAVIX 75 mg Tab Generic drug:  clopidogrel Take 75 mg by mouth. 75 mg  
    
   
   
   
  
 VESIcare 10 mg tablet Generic drug:  solifenacin TAKE 1 TABLET DAILY ZOCOR 20 mg tablet Generic drug:  simvastatin Your next dose is: Tonight Take  by mouth nightly. ASK your doctor about these medications Instructions Each Dose to Equal  
 Morning Noon Evening Bedtime  
 ibuprofen 200 mg tablet Commonly known as:  MOTRIN Take 400 mg by mouth every six (6) hours as needed for Pain. 400 mg Where to Get Your Medications Information on where to get these meds will be given to you by the nurse or doctor. ! Ask your nurse or doctor about these medications ALPRAZolam 0.5 mg tablet Discharge Instructions Cardiac Catheterization/Angiography Discharge Instructions *Check the puncture site frequently for swelling or bleeding. If you see any bleeding, lie down and apply pressure over the area with a clean town or washcloth. Notify your doctor for any redness, swelling, drainage or oozing from the puncture site. Notify your doctor for any fever or chills. *If the leg or arm with the puncture becomes cold, numb or painful, go to your nearest emergency room. *Activity should be limited for the next 48 hours. Climb stairs as little as possible and avoid any stooping, bending or strenuous activity for 48 hours. No heavy lifting (anything over 10 pounds) for three days. *Do not drive for 48 hours. *You may resume your usual diet. Drink more fluids than usual. 
 
*Have a responsible person drive you home and stay with you for at least 24 hours after your heart catheterization/angiography. *You may remove the bandage from your Right and Groin in 24 hours. You may shower in 24 hours. No tub baths, hot tubs or swimming for one week. Do not place any lotions, creams, powders, ointments over the puncture site for one week. You may place a clean band-aid over the puncture site each day for 5 days. Change this daily. Sedation for a Medical Procedure: Care Instructions Your Care Instructions For a minor procedure or surgery, you will get a sedative to help you relax. This drug will make you sleepy. It is usually given in a vein (by IV). A shot may also be used to numb the area. If you had local anesthesia, you may feel some pain and discomfort as it wears off. If you have pain, don't be afraid to say so. Pain medicine works better if you take it before the pain gets bad. Common side effects from sedation include: · Feeling sleepy. (Your doctors and nurses will make sure you are not too sleepy to go home.) · Nausea and vomiting. This usually does not last long. · Feeling tired. Follow-up care is a key part of your treatment and safety. Be sure to make and go to all appointments, and call your doctor if you are having problems. It's also a good idea to know your test results and keep a list of the medicines you take. How can you care for yourself at home? Activity ? · Don't do anything for 24 hours that requires attention to detail. It takes time for the medicine effects to completely wear off.  
? · For your safety, you should not drive or operate any machinery that could be dangerous until the medicine wears off and you can think clearly and react easily. ? · Rest when you feel tired. Getting enough sleep will help you recover. Diet ? · You can eat your normal diet, unless your doctor gives you other instructions. If your stomach is upset, try clear liquids and bland, low-fat foods like plain toast or rice. ? · Drink plenty of fluids (unless your doctor tells you not to). ? · Don't drink alcohol for 24 hours. Medicines ? · Be safe with medicines. Read and follow all instructions on the label. ¨ If the doctor gave you a prescription medicine for pain, take it as prescribed. ¨ If you are not taking a prescription pain medicine, ask your doctor if you can take an over-the-counter medicine. ? · If you think your pain medicine is making you sick to your stomach: 
¨ Take your medicine after meals (unless your doctor has told you not to). ¨ Ask your doctor for a different pain medicine. When should you call for help? Call 911 anytime you think you may need emergency care. For example, call if: 
? · You have severe trouble breathing. ? · You passed out (lost consciousness). ?Call your doctor now or seek immediate medical care if: ? · You have trouble breathing. ? · You have ongoing or worsening nausea or vomiting. ? · You have a fever. ? · You have a new or worse headache. ? · The medicine is not wearing off and you can't think clearly. ? Watch closely for changes in your health, and be sure to contact your doctor if: 
? · You do not get better as expected. Where can you learn more? Go to http://ann-jose.info/. Enter R617 in the search box to learn more about \"Sedation for a Medical Procedure: Care Instructions. \" Current as of: August 14, 2016 Content Version: 11.4 © 7798-9455 Kaymu.pk. Care instructions adapted under license by [employer] (which disclaims liability or warranty for this information). If you have questions about a medical condition or this instruction, always ask your healthcare professional. [address] any warranty or liability for your use of this information. DISCHARGE SUMMARY from Nurse PATIENT INSTRUCTIONS: 
 
After general anesthesia or intravenous sedation, for 24 hours or while taking prescription Narcotics: · Limit your activities · Do not drive and operate hazardous machinery · Do not make important personal or business decisions · Do  not drink alcoholic beverages · If you have not urinated within 8 hours after discharge, please contact your surgeon on call. Report the following to your surgeon: 
· Excessive pain, swelling, redness or odor of or around the surgical area · Temperature over 100.5 · Nausea and vomiting lasting longer than 4 hours or if unable to take medications · Any signs of decreased circulation or nerve impairment to extremity: change in color, persistent  numbness, tingling, coldness or increase pain · Any questions What to do at Home: 
Recommended activity: Activity as tolerated.  
 
If you experience any of the following symptoms new or worsening symptoms, please follow up with your primary care provider. *  Please give a list of your current medications to your Primary Care Provider. *  Please update this list whenever your medications are discontinued, doses are 
    changed, or new medications (including over-the-counter products) are added. *  Please carry medication information at all times in case of emergency situations. These are general instructions for a healthy lifestyle: No smoking/ No tobacco products/ Avoid exposure to second hand smoke Surgeon General's Warning:  Quitting smoking now greatly reduces serious risk to your health. Obesity, smoking, and sedentary lifestyle greatly increases your risk for illness A healthy diet, regular physical exercise & weight monitoring are important for maintaining a healthy lifestyle You may be retaining fluid if you have a history of heart failure or if you experience any of the following symptoms:  Weight gain of 3 pounds or more overnight or 5 pounds in a week, increased swelling in our hands or feet or shortness of breath while lying flat in bed. Please call your doctor as soon as you notice any of these symptoms; do not wait until your next office visit. Recognize signs and symptoms of STROKE: 
 
F-face looks uneven A-arms unable to move or move unevenly S-speech slurred or non-existent T-time-call 911 as soon as signs and symptoms begin-DO NOT go Back to bed or wait to see if you get better-TIME IS BRAIN. Warning Signs of HEART ATTACK Call 911 if you have these symptoms: 
? Chest discomfort. Most heart attacks involve discomfort in the center of the chest that lasts more than a few minutes, or that goes away and comes back. It can feel like uncomfortable pressure, squeezing, fullness, or pain. ? Discomfort in other areas of the upper body. Symptoms can include pain or discomfort in one or both arms, the back, neck, jaw, or stomach. ? Shortness of breath with or without chest discomfort. ? Other signs may include breaking out in a cold sweat, nausea, or lightheadedness. Don't wait more than five minutes to call [address]! Fast action can save your life. Calling 911 is almost always the fastest way to get lifesaving treatment. Emergency Medical Services staff can begin treatment when they arrive  up to an hour sooner than if someone gets to the hospital by car. The discharge information has been reviewed with the patient. The patient verbalized understanding. Discharge medications reviewed with the patient and appropriate educational materials and side effects teaching were provided. ___________________________________________________________________________________________________________________________________ Patient armband removed and shredded. Occlutech Announcement We are excited to announce that we are making your provider's discharge notes available to you in Occlutech. You will see these notes when they are completed and signed by the physician that discharged you from your recent hospital stay. If you have any questions or concerns about any information you see in Occlutech, please call the Health Information Department where you were seen or reach out to your Primary Care Provider for more information about your plan of care. Introducing [facility] & HEALTH SERVICES! Dear Alex Mccracken: Thank you for requesting a Occlutech account. Our records indicate that you already have an active [employer] account. You can access your account anytime at [email or web address] Did you know that you can access your hospital and ER discharge instructions at any time in Occl[employer]ch? You can also review all of your test results from your hospital stay or ER visit. Additional Information If you have questions, please visit the Frequently Asked Questions section of the Sandbox website at [email or web address] Remember, Sandbox is NOT to be used for urgent needs. For medical emergencies, dial 911. Now available from your iPhone and Android! Unresulted Labs-Please follow up with your PCP about these lab tests Order Current Status EKG, 12 LEAD, SUBSEQUENT Preliminary result EKG, 12 LEAD, SUBSEQUENT Preliminary result Providers Seen During Your Hospitalization Provider Specialty Primary office phone Jenny Sharp MD Cardiology [phone] Your Primary Care Physician (PCP) Primary Care Physician Office Phone Office Fax Gustavo Eliud [phone] You are allergic to the following No active allergies Recent Documentation Height Weight BMI Smoking Status 1.778 m 113 kg 35.74 kg/m2 Former Smoker Emergency Contacts Name Discharge Info Relation Home Work Mobile [employer] DISCHARGE CAREGIVER [3] Spouse [3] [phone] Lisa Zamora DISCHARGE CAREGIVER [3] Daughter [21] [phone] Patient Belongings The following personal items are in your possession at time of discharge: 
  Dental Appliances: None  Visual Aid: Glasses, With patient      Home Medications: None   Jewelry: Sent home  Clothing: At bedside, Hat    Other Valuables: Eyeglasses, Home Medical Equipment(comment) (hearing aid) Please provide this summary of care documentation to your next provider. Signatures-by signing, you are acknowledging that this After Visit Summary has been reviewed with you and you have received a copy. Patient Signature:  ____________________________________________________________ Date:  ____________________________________________________________  
  
Rosalio Lion Provider Signature:  ____________________________________________________________ Date:  ____________________________________________________________

## 2018-01-10 NOTE — PROGRESS NOTES
Pt admitted from cath lab, escorted by RN and daughter. Bedside handoff completed and R groin site assessed. Site intact without pain, swelling or hematoma. Pt had episode of nausea and vomited after being transferred into bed. PRN phenergan given. Pt connected to tele monitor - sinus bradycardia noted with HR maintaining in 40-50s. No complaints of pain at this time. Will continue to monitor.

## 2018-01-10 NOTE — ROUTINE PROCESS
TRANSFER - OUT REPORT:  Verbal report given to MICHAEL(name) on Pamela Fairchild being transferred to CARE(unit) for routine progression of care   Report consisted of patients Situation, Background, Assessment and   Recommendations(SBAR). Information from the following report(s) Procedure Summary, Recent Results, Med Rec Status and Cardiac Rhythm SB was reviewed with the receiving nurse.     Cath Lab Report:    Procedure:  [ ] Darnelle Clam  [ ] Trino Nails  Wicket.Pinto ] PTCA   [ ] Peripheral   [ ] Pacemaker [ ] LIAM  [ ] [address]    Access site:   X ] Femoral  RIGHT      Sheath:           Wicket.Bean ] Pulled in Cath Lab   [ ] In place   [ ] To be pulled after:         Closure:          [ ] TR Band [ ] Radial Band  [ ] Right [ ] Left    [ ] Manual Pressure     Wicket.Pinto ] Dickie Ringer     [ ] Star Close    [ ] Per Close    [ ] Safe Guard    Site Assessment:   X ] Clean, Dry, No bleeding    [ ] Minor oozing          [ ] Hematoma: Description:    Stents(s) Placement:  [ ] Left Main:                 Wicket.Bean ] LAD:                [ ] Circ:                [ ] RCA:                [ ] EF:     [ ] Peripheral:      [ ] N/A    Infusion [X ]Angiomax     Intra procedure Medications:    Fentanyl:100 MCG  Versed: 2 MG  PLAVIX 150 MG        Lines:        Peripheral IV 01/10/18 Right Hand (Active)   Site Assessment Clean, dry, & intact 1/10/2018  7:39 AM   Phlebitis Assessment 0 1/10/2018  7:39 AM   Infiltration Assessment 0 1/10/2018  7:39 AM   Dressing Status Clean, dry, & intact 1/10/2018  7:39 AM   Dressing Type Transparent 1/10/2018  7:39 AM   Hub Color/Line Status Pink 1/10/2018  7:39 AM   Action Taken Blood drawn 1/10/2018  7:39 AM   Alcohol Cap Used Yes 1/10/2018  7:39 AM          Patient Vitals for the past 4 hrs:   Temp Pulse Resp BP SpO2   01/10/18 0950 - (!) 48 18 104/83 -   01/10/18 0940 98.4 °F (36.9 °C) (!) 50 16 116/72 99 %   01/10/18 0721 97.7 °F (36.5 °C) (!) 33 18 142/60 -         Extended / Orthostatic Vitals:    Vital Signs  Level of Consciousness: Alert (01/10/18 0940)  Temp: 98.4 °F (36.9 °C) (01/10/18 0940)  Temp Source: Oral (01/10/18 0940)  Pulse (Heart Rate): (!) 48 (01/10/18 0950)  Heart Rate Source: Monitor (01/10/18 0940)  Cardiac Rhythm: Sinus bradycardia (01/10/18 0940)  Resp Rate: 18 (01/10/18 0950)  BP: 104/83 (01/10/18 0950)  MAP (Calculated): 90 (01/10/18 0950)  BP 1 Location: Left arm (01/10/18 0940)  BP 1 Method: Automatic (01/10/18 0940)  BP Patient Position: At rest;Supine (01/10/18 0940)  MEWS Score: 2 (01/10/18 0940)         Oxygen Therapy  O2 Sat (%): 99 % (01/10/18 0940)  O2 Device: Nasal cannula (01/10/18 0950)          Opportunity for questions and clarification was provided.

## 2018-01-10 NOTE — PROCEDURES
Brief Cardiac Catheterization Procedure Note    Patient: Ca Parks MRN: [id number]  SSN: [id number]    YOB: 1935  Age: [de-identified] y.o. Sex: male      Date of Procedure: 1/10/2018     Pre-procedure Diagnosis:  Coronary Artery Disease    Post-procedure Diagnosis: Coronary Artery Disease    Procedure: Left Heart Catheterization with Percutaneous Coronary Intervention    Performed By: Preeti Holguin MD     Anesthesia: Moderate Sedation    Estimated Blood Loss: Less than 10 mL      Specimens: None    Findings: Patent proximal LAD stent. Mid LAD stenosis with positive iFR    Complications: None    Implants: 3.0 mm x 22 mm Resolute Integrity, 2.75 mm x 18 mm Resolute Integrity JUDY's to LAD. Recommendations: Continue medical therapy. Staged PCI of RCA.      Signed By: Preeti Holguin MD     January 10, 2018

## 2018-01-10 NOTE — PROCEDURES
[facility]    Norva Bosworth  MR#: [id number]  : 1935  ACCOUNT #: [de-identified]   DATE OF SERVICE: 01/10/2018    PREOPERATIVE DIAGNOSIS:  Coronary artery disease. POSTOPERATIVE DIAGNOSIS:  Coronary artery disease. PROCEDURES:    1. Left heart catheterization, coronary angio, code 83702. 2.  Fractional flow reserve determination, code 43336.    3.  Intravascular ultrasound, coronary, code 01427. 4.  Stent deployment, left anterior descending coronary artery, code 32646. 5.  Conscious sedation, code L1333289, [id number], [id number], [id number] and [id number]. SURGEON:  Dr. Russell Quevedo. ANESTHESIA:  Conscious sedation with local.    SPECIMEN:  None. ESTIMATED BLOOD LOSS:  Minimal.    BRIEF CLINICAL HISTORY:  The patient is an 82-year-old man with known severe coronary artery disease. He previously had a catheterization and surgical consultation suggested PCI rather than bypass in this patient. We, therefore, previously put a stent in his proximal LAD. He has severe diffuse coronary artery disease. He had a lesion in his mid LAD that needed to be assessed on this occasion. The flow wire was introduced and IFR was positive. Accordingly, it was decided to proceed with intervention on the mid LAD lesion. We then introduced an IVUS catheter and imaged the distal anterior descending in the area of the lesion as well as the previously deployed stent. I then used a 2.5 x 15 mm balloon. Our lesion was dilated. I then deployed a 3.0 x 22 mm Resolute Integrity stent. Post-stent deployment angiogram showed a distal dissection. The distal dissection was then covered with a 2.75 x 18 mm Resolute Integrity stent with some overlap. The proximal part of the distal stent was then dilated again up to 3 mm using the stent balloon. Post-stent deployment angiograms revealed widely patent LAD with no evidence of dissection after the second stent deployment.   There was loss of a small diagonal distally. At this point, it was decided that the right should be done as a staged procedure rather than proceed today. We, therefore, removed the guide catheter, etc.  Right femoral angiogram was performed through the sheath. The artery was sufficiently large to use a closure device. The patient was then reprepped and redraped and we changed gloves. Angio-Seal was deployed. Hemostasis was assured. Dry sterile dressing was applied. The patient was returned to recovery in satisfactory condition having tolerated the procedure well. There were no procedural complications other than the loss of the distal second diagonal.  It was not completely gone, but there was markedly decreased flow in that vessel. It was very small, too small to do an intervention on. CONCLUSIONS:  Satisfactory PCI of mid LAD with the dissection being covered with a second stent. Subsequent angiogram showed widely patent stented sites and no evidence of dissection on the last angiogram.    PLAN:  Staged procedure of the right coronary artery in the future. Continue medical therapy.       MD LIZZIE Morelos / Davidson Jorge  D: 01/10/2018 10:34     T: 01/10/2018 11:16  JOB #: [id number]

## 2018-01-11 VITALS
TEMPERATURE: 97.2 F | OXYGEN SATURATION: 96 % | SYSTOLIC BLOOD PRESSURE: 98 MMHG | RESPIRATION RATE: 18 BRPM | HEART RATE: 50 BPM | WEIGHT: 249.12 LBS | BODY MASS INDEX: 35.66 KG/M2 | HEIGHT: 70 IN | DIASTOLIC BLOOD PRESSURE: 51 MMHG

## 2018-01-11 LAB
ANION GAP SERPL CALC-SCNC: 10 MMOL/L (ref 3–18)
BUN SERPL-MCNC: 19 MG/DL (ref 7–18)
BUN/CREAT SERPL: 19 (ref 12–20)
CALCIUM SERPL-MCNC: 8.7 MG/DL (ref 8.5–10.1)
CHLORIDE SERPL-SCNC: 103 MMOL/L (ref 100–108)
CO2 SERPL-SCNC: 26 MMOL/L (ref 21–32)
CREAT SERPL-MCNC: 0.98 MG/DL (ref 0.6–1.3)
GLUCOSE BLD STRIP.AUTO-MCNC: 108 MG/DL (ref 70–110)
GLUCOSE BLD STRIP.AUTO-MCNC: 147 MG/DL (ref 70–110)
GLUCOSE SERPL-MCNC: 132 MG/DL (ref 74–99)
MAGNESIUM SERPL-MCNC: 1.8 MG/DL (ref 1.6–2.6)
POTASSIUM SERPL-SCNC: 3.8 MMOL/L (ref 3.5–5.5)
SODIUM SERPL-SCNC: 139 MMOL/L (ref 136–145)

## 2018-01-11 PROCEDURE — 74011250637 HC RX REV CODE- 250/637: Performed by: HOSPITALIST

## 2018-01-11 PROCEDURE — 99218 HC RM OBSERVATION: CPT

## 2018-01-11 PROCEDURE — 74011250637 HC RX REV CODE- 250/637: Performed by: INTERNAL MEDICINE

## 2018-01-11 PROCEDURE — 82962 GLUCOSE BLOOD TEST: CPT

## 2018-01-11 PROCEDURE — 77010033678 HC OXYGEN DAILY

## 2018-01-11 PROCEDURE — 93005 ELECTROCARDIOGRAM TRACING: CPT

## 2018-01-11 PROCEDURE — 83735 ASSAY OF MAGNESIUM: CPT | Performed by: INTERNAL MEDICINE

## 2018-01-11 PROCEDURE — 80048 BASIC METABOLIC PNL TOTAL CA: CPT | Performed by: INTERNAL MEDICINE

## 2018-01-11 PROCEDURE — 36415 COLL VENOUS BLD VENIPUNCTURE: CPT | Performed by: INTERNAL MEDICINE

## 2018-01-11 RX ORDER — ALPRAZOLAM 0.5 MG/1
0.5 TABLET ORAL
Qty: 20 TAB | Refills: 0 | Status: SHIPPED | OUTPATIENT
Start: 2018-01-11 | End: 2018-01-21

## 2018-01-11 RX ADMIN — Medication 30 ML: at 03:57

## 2018-01-11 RX ADMIN — TELMISARTAN 20 MG: 40 TABLET ORAL at 10:35

## 2018-01-11 RX ADMIN — CLOPIDOGREL BISULFATE 75 MG: 75 TABLET ORAL at 10:34

## 2018-01-11 RX ADMIN — Medication 10 ML: at 06:43

## 2018-01-11 RX ADMIN — ASPIRIN 81 MG: 81 TABLET, COATED ORAL at 10:34

## 2018-01-11 NOTE — DISCHARGE SUMMARY
Cardiology Discharge Summary     Patient ID:  Abraham Babcock  [id number]  6 y.o.  1935    Admit Date: 1/10/2018    Discharge Date: 1/11/2018     Admitting Physician: Bailee Duarte MD     Discharge Physician: Bailee Duarte MD    * Admission Diagnoses: cardiac  Stented coronary artery  CAD (coronary artery disease)    * Discharge Diagnoses:   Hospital Problems as of 1/11/2018  Date Reviewed: 1/11/2018          Codes Class Noted - Resolved POA    Stented coronary artery ICD-10-CM: Z95.5  ICD-9-CM: V45.82  1/10/2018 - Present Unknown        CAD (coronary artery disease) ICD-10-CM: I25.10  ICD-9-CM: 414.00  12/13/2017 - Present Unknown              * Discharge Condition: good    * Cardiology Procedures this Admission:  Left heart catheterization with PCI  Cardiology and IR Orders (Through next 24h)    Start     Ordered    01/10/18 0830  [facility] Oleary Smart  [[id number]]  ONE TIME      01/10/18 0828    01/10/18 0800  CARDIAC CATHETERIZATION  [[id number]]  ONE TIME      01/10/18 0704    --  CARDIAC CATHETERIZATION  [[id number]]      01/10/18 0000          * Hospital Course: Had some nausea and vomiting after pain meds. Had accelerated ventricular rhythm. He remained hemodynamically stable and angina free. Home on current meds. Groin and pulses are okay. Consults: None    Discharge Exam:     Visit Vitals    BP 98/51 (BP 1 Location: Left arm, BP Patient Position: At rest)    Pulse (!) 50    Temp 97.2 °F (36.2 °C)    Resp 18    Ht 5' 10\" (1.778 m)    Wt 113 kg (249 lb 1.9 oz)    SpO2 96%    BMI 35.74 kg/m2     General Appearance:  Well developed, well nourished,alert and oriented x 3, and individual in no acute distress. Ears/Nose/Mouth/Throat:   Hearing grossly normal.         Neck: Supple. Chest:   Lungs clear to auscultation bilaterally. Cardiovascular:  Regular rate and rhythm, S1, S2 normal, no murmur. Abdomen:   Soft, non-tender, bowel sounds are active. Extremities: No edema bilaterally.     Skin: Warm and dry. * Disposition: Home    Discharge Medications:   Current Discharge Medication List      CONTINUE these medications which have CHANGED    Details   !! ALPRAZolam (XANAX) 0.5 mg tablet Take 1 Tab by mouth two (2) times daily as needed for Anxiety for up to 10 days. Indications: anxiety  Qty: 20 Tab, Refills: 0    Associated Diagnoses: Anxiety       ! ! - Potential duplicate medications found. Please discuss with provider. CONTINUE these medications which have NOT CHANGED    Details   !! ALPRAZolam (XANAX) 0.5 mg tablet Take 0.5 mg by mouth two (2) times daily as needed for Anxiety. aspirin delayed-release 81 mg tablet Take 81 mg by mouth daily. clopidogrel (PLAVIX) 75 mg tab Take 75 mg by mouth.      metoprolol succinate (TOPROL XL) 50 mg XL tablet Take 1 Tab by mouth daily. Qty: 30 Tab, Refills: 6      mirabegron ER (MYRBETRIQ) 25 mg ER tablet Take 50 mg by mouth daily. hydrOXYzine HCl (ATARAX) 10 mg tablet Take 10 mg by mouth nightly. VESICARE 10 mg tablet TAKE 1 TABLET DAILY  Qty: 90 Tab, Refills: 0      simvastatin (ZOCOR) 20 mg tablet Take  by mouth nightly. Associated Diagnoses: Malignant neoplasm of prostate (HCC)      telmisartan (MICARDIS) 20 mg tablet Take  by mouth daily. Associated Diagnoses: Malignant neoplasm of prostate [facility])       ! ! - Potential duplicate medications found. Please discuss with provider. * Follow-up Care/Patient Instructions:    Activity:Activity as tolerated  Diet: Cardiac Diet  Wound Care: Keep wound clean and dry    Follow-up Information     Follow up With Details Comments Plain, NP   [id number] [facility]  [phone]            Signed:  Parth Gaston MD  1/11/2018  11:28 AM

## 2018-01-11 NOTE — WOUND CARE
Pt assessed by wound care during monthly skin prevalence. Pt sitting on edge of bed and has a rosette score of 21. No pressure injuries noted at this time.

## 2018-01-11 NOTE — DISCHARGE INSTRUCTIONS
Cardiac Catheterization/Angiography Discharge Instructions    *Check the puncture site frequently for swelling or bleeding. If you see any bleeding, lie down and apply pressure over the area with a clean town or washcloth. Notify your doctor for any redness, swelling, drainage or oozing from the puncture site. Notify your doctor for any fever or chills. *If the leg or arm with the puncture becomes cold, numb or painful, go to your nearest emergency room. *Activity should be limited for the next 48 hours. Climb stairs as little as possible and avoid any stooping, bending or strenuous activity for 48 hours. No heavy lifting (anything over 10 pounds) for three days. *Do not drive for 48 hours. *You may resume your usual diet. Drink more fluids than usual.    *Have a responsible person drive you home and stay with you for at least 24 hours after your heart catheterization/angiography. *You may remove the bandage from your Right and Groin in 24 hours. You may shower in 24 hours. No tub baths, hot tubs or swimming for one week. Do not place any lotions, creams, powders, ointments over the puncture site for one week. You may place a clean band-aid over the puncture site each day for 5 days. Change this daily. Sedation for a Medical Procedure: Care Instructions  Your Care Instructions    For a minor procedure or surgery, you will get a sedative to help you relax. This drug will make you sleepy. It is usually given in a vein (by IV). A shot may also be used to numb the area. If you had local anesthesia, you may feel some pain and discomfort as it wears off. If you have pain, don't be afraid to say so. Pain medicine works better if you take it before the pain gets bad. Common side effects from sedation include:  · Feeling sleepy. (Your doctors and nurses will make sure you are not too sleepy to go home.)  · Nausea and vomiting. This usually does not last long.   · Feeling tired.  Follow-up care is a key part of your treatment and safety. Be sure to make and go to all appointments, and call your doctor if you are having problems. It's also a good idea to know your test results and keep a list of the medicines you take. How can you care for yourself at home? Activity  ? · Don't do anything for 24 hours that requires attention to detail. It takes time for the medicine effects to completely wear off.   ? · For your safety, you should not drive or operate any machinery that could be dangerous until the medicine wears off and you can think clearly and react easily. ? · Rest when you feel tired. Getting enough sleep will help you recover. Diet  ? · You can eat your normal diet, unless your doctor gives you other instructions. If your stomach is upset, try clear liquids and bland, low-fat foods like plain toast or rice. ? · Drink plenty of fluids (unless your doctor tells you not to). ? · Don't drink alcohol for 24 hours. Medicines  ? · Be safe with medicines. Read and follow all instructions on the label. ¨ If the doctor gave you a prescription medicine for pain, take it as prescribed. ¨ If you are not taking a prescription pain medicine, ask your doctor if you can take an over-the-counter medicine. ? · If you think your pain medicine is making you sick to your stomach:  ¨ Take your medicine after meals (unless your doctor has told you not to). ¨ Ask your doctor for a different pain medicine. When should you call for help? Call 911 anytime you think you may need emergency care. For example, call if:  ? · You have severe trouble breathing. ? · You passed out (lost consciousness). ?Call your doctor now or seek immediate medical care if:  ? · You have trouble breathing. ? · You have ongoing or worsening nausea or vomiting. ? · You have a fever. ? · You have a new or worse headache. ? · The medicine is not wearing off and you can't think clearly. ? Watch closely for changes in your health, and be sure to contact your doctor if:  ? · You do not get better as expected. Where can you learn more? Go to http://ann-jose.info/. Enter R651 in the search box to learn more about \"Sedation for a Medical Procedure: Care Instructions. \"  Current as of: August 14, 2016  Content Version: 11.4  © 4364-3036 [email or web address]. Care instructions adapted under license by [employer] (which disclaims liability or warranty for this information). If you have questions about a medical condition or this instruction, always ask your healthcare professional. [place] [address] any warranty or liability for your use of this information. DISCHARGE SUMMARY from Nurse    PATIENT INSTRUCTIONS:    After general anesthesia or intravenous sedation, for 24 hours or while taking prescription Narcotics:  · Limit your activities  · Do not drive and operate hazardous machinery  · Do not make important personal or business decisions  · Do  not drink alcoholic beverages  · If you have not urinated within 8 hours after discharge, please contact your surgeon on call. Report the following to your surgeon:  · Excessive pain, swelling, redness or odor of or around the surgical area  · Temperature over 100.5  · Nausea and vomiting lasting longer than 4 hours or if unable to take medications  · Any signs of decreased circulation or nerve impairment to extremity: change in color, persistent  numbness, tingling, coldness or increase pain  · Any questions    What to do at Home:  Recommended activity: Activity as tolerated. If you experience any of the following symptoms new or worsening symptoms, please follow up with your primary care provider. *  Please give a list of your current medications to your Primary Care Provider.     *  Please update this list whenever your medications are discontinued, doses are      changed, or new medications (including over-the-counter products) are added. *  Please carry medication information at all times in case of emergency situations. These are general instructions for a healthy lifestyle:    No smoking/ No tobacco products/ Avoid exposure to second hand smoke  Surgeon General's Warning:  Quitting smoking now greatly reduces serious risk to your health. Obesity, smoking, and sedentary lifestyle greatly increases your risk for illness    A healthy diet, regular physical exercise & weight monitoring are important for maintaining a healthy lifestyle    You may be retaining fluid if you have a history of heart failure or if you experience any of the following symptoms:  Weight gain of 3 pounds or more overnight or 5 pounds in a week, increased swelling in our hands or feet or shortness of breath while lying flat in bed. Please call your doctor as soon as you notice any of these symptoms; do not wait until your next office visit. Recognize signs and symptoms of STROKE:    F-face looks uneven    A-arms unable to move or move unevenly    S-speech slurred or non-existent    T-time-call 911 as soon as signs and symptoms begin-DO NOT go       Back to bed or wait to see if you get better-TIME IS BRAIN. Warning Signs of HEART ATTACK     Call 911 if you have these symptoms:   Chest discomfort. Most heart attacks involve discomfort in the center of the chest that lasts more than a few minutes, or that goes away and comes back. It can feel like uncomfortable pressure, squeezing, fullness, or pain.  Discomfort in other areas of the upper body. Symptoms can include pain or discomfort in one or both arms, the back, neck, jaw, or stomach.  Shortness of breath with or without chest discomfort.  Other signs may include breaking out in a cold sweat, nausea, or lightheadedness. Don't wait more than five minutes to call 911 - MINUTES MATTER! Fast action can save your life.  Calling 911 is almost always the fastest way to get lifesaving treatment. Emergency Medical Services staff can begin treatment when they arrive -- up to an hour sooner than if someone gets to the hospital by car. The discharge information has been reviewed with the patient. The patient verbalized understanding. Discharge medications reviewed with the patient and appropriate educational materials and side effects teaching were provided. ___________________________________________________________________________________________________________________________________    Patient armband removed and shredded.

## 2018-01-11 NOTE — PROGRESS NOTES
Chart Reviewed. Noted patient admitted to the hospital for further medical management. Care Management to follow up with patient and/or family for transition of care needs prn. Readmission Risk Assessment:     Moderate Risk and MSSP/Good Help ACO patients    RRAT Score:  13-20    Initial Assessment: patient on observation on telemetry post cath     Emergency Contact:  See facesheet    Pertinent Medical Hx:         PCP/Specialists: Elyssa harris      Community Services:       DME:          Moderate Risk Care Transition Plan:  1. Evaluate for [facility] or [facility], SNF, acute rehab, community care coordination of resources. 2. Involve patient/caregiver in assessment, planning, education and implement of intervention. 3. CM daily patient care huddles/interdisciplinary rounds. 4. PCP/Specialist appointment within 5  7 days made prior to discharge. 5. Facilitate transportation and logistics for follow-up appointments. 6. Medication reconciliation [address]  7. Formal handoff between hospital provider and post-acute provider to transition patient  Handoff to [address] Nurse Navigator or PCP practice.

## 2018-01-11 NOTE — PROGRESS NOTES
2684:  Assumed care for patient, received bedside report from Christopher Edwards RN. Patient sitting on side of bed with no complaints of pain or discomfort at the moment. Whiteboard updated, bed at the lowest position with call bell within reach. 1145:  Patient sitting in bed watching television with no complaints of pain or discomfort at the moment, preparing for discharge.

## 2018-01-11 NOTE — PROGRESS NOTES
1935: Assumed patient care, he was alert and oriented to person, place, time and situation. Respiratory status was stable on 2L via nasal cannula. Vital signs were stable. MEWS score was a one. Patient denied any nausea vomiting dizziness or anxiety. White board was updated and explained. Bed was locked and in lowest position. Call bell, water and personal belongings were within reach. Patient had no questions, comments or concerns after bedside shift report. 2115: Patient had a six beat run of V-tach. Vital signs were normal. Patient appeared to have no signs or symptoms of distress noted. 0220: Patient had an eight beat run of V-tach. Vital signs were normal. Patient appeared to have no signs or symptoms of distress noted. Patient complained of epigastric pain. Hospitalist was called and updated on the patient's condition. GI cocktail was ordered. 0400: Patient had a sis beat run of V-tach. Vital signs were normal. Patient appeared to have no signs of distress noted. The Cardiologist, JASWINDER Segovia was called and updated on the patient's condition.  ordered a stat potassium and magnesium lab draw. No other orders at that time. Patient appeared to tolerate the GI cocktail well. 0430: Patient appeared to be sleeping with no signs of distress noted.

## 2018-01-11 NOTE — PROGRESS NOTES
Problem: Falls - Risk of  Goal: *Absence of Falls  Document Chelsie Fall Risk and appropriate interventions in the flowsheet.    Outcome: Progressing Towards Goal  Fall Risk Interventions:            Medication Interventions: Patient to call before getting OOB, Teach patient to arise slowly

## 2018-01-13 LAB
ATRIAL RATE: 46 BPM
ATRIAL RATE: 53 BPM
CALCULATED P AXIS, ECG09: 64 DEGREES
CALCULATED P AXIS, ECG09: 68 DEGREES
CALCULATED R AXIS, ECG10: 68 DEGREES
CALCULATED R AXIS, ECG10: 86 DEGREES
CALCULATED T AXIS, ECG11: 115 DEGREES
CALCULATED T AXIS, ECG11: 71 DEGREES
DIAGNOSIS, 93000: NORMAL
DIAGNOSIS, 93000: NORMAL
P-R INTERVAL, ECG05: 272 MS
P-R INTERVAL, ECG05: 272 MS
Q-T INTERVAL, ECG07: 486 MS
Q-T INTERVAL, ECG07: 492 MS
QRS DURATION, ECG06: 94 MS
QRS DURATION, ECG06: 98 MS
QTC CALCULATION (BEZET), ECG08: 430 MS
QTC CALCULATION (BEZET), ECG08: 456 MS
VENTRICULAR RATE, ECG03: 46 BPM
VENTRICULAR RATE, ECG03: 53 BPM

## 2018-03-13 PROBLEM — I10 HYPERTENSION: Status: ACTIVE | Noted: 2018-01-12

## 2018-03-13 PROBLEM — I50.41 ACUTE COMBINED SYSTOLIC AND DIASTOLIC CONGESTIVE HEART FAILURE (HCC): Status: ACTIVE | Noted: 2018-01-16

## 2018-03-13 PROBLEM — N32.81: Status: ACTIVE | Noted: 2018-03-13

## 2018-03-13 PROBLEM — I72.9 PSEUDOANEURYSM FOLLOWING PROCEDURE (HCC): Status: ACTIVE | Noted: 2018-02-12

## 2018-03-13 PROBLEM — E78.00 HYPERCHOLESTEREMIA: Status: ACTIVE | Noted: 2018-01-12

## 2018-03-13 PROBLEM — I21.4 NSTEMI (NON-ST ELEVATED MYOCARDIAL INFARCTION) (HCC): Status: ACTIVE | Noted: 2018-01-12

## 2018-03-13 PROBLEM — T81.718A PSEUDOANEURYSM FOLLOWING PROCEDURE (HCC): Status: ACTIVE | Noted: 2018-02-12

## 2018-10-19 ENCOUNTER — HOSPITAL ENCOUNTER (OUTPATIENT)
Dept: PREADMISSION TESTING | Age: 83
Discharge: HOME | End: 2018-10-19
Payer: MEDICARE

## 2018-10-19 LAB
ABO + RH BLD: NORMAL
ALBUMIN SERPL-MCNC: 2.9 G/DL (ref 3.4–5)
ALBUMIN/GLOB SERPL: 0.6 {RATIO} (ref 0.8–1.7)
ALP SERPL-CCNC: 110 U/L (ref 45–117)
ALT SERPL-CCNC: 26 U/L (ref 16–61)
ANION GAP SERPL CALC-SCNC: 10 MMOL/L (ref 3–18)
APPEARANCE UR: CLEAR
APTT PPP: 36.1 SEC (ref 23–36.4)
AST SERPL-CCNC: 22 U/L (ref 15–37)
BACTERIA SPEC CULT: NORMAL
BASOPHILS # BLD: 0 K/UL (ref 0–0.1)
BASOPHILS NFR BLD: 0 % (ref 0–2)
BILIRUB SERPL-MCNC: 1 MG/DL (ref 0.2–1)
BILIRUB UR QL: NEGATIVE
BLOOD GROUP ANTIBODIES SERPL: NORMAL
BUN SERPL-MCNC: 15 MG/DL (ref 7–18)
BUN/CREAT SERPL: 13 (ref 12–20)
CALCIUM SERPL-MCNC: 8.9 MG/DL (ref 8.5–10.1)
CHLORIDE SERPL-SCNC: 100 MMOL/L (ref 100–108)
CO2 SERPL-SCNC: 26 MMOL/L (ref 21–32)
COLOR UR: YELLOW
CREAT SERPL-MCNC: 1.13 MG/DL (ref 0.6–1.3)
DIFFERENTIAL METHOD BLD: ABNORMAL
EOSINOPHIL # BLD: 0.1 K/UL (ref 0–0.4)
EOSINOPHIL NFR BLD: 1 % (ref 0–5)
EPITH CASTS URNS QL MICRO: ABNORMAL /LPF (ref 0–5)
ERYTHROCYTE [DISTWIDTH] IN BLOOD BY AUTOMATED COUNT: 15.9 % (ref 11.6–14.5)
ERYTHROCYTE [SEDIMENTATION RATE] IN BLOOD: 56 MM/HR (ref 0–20)
EST. AVERAGE GLUCOSE BLD GHB EST-MCNC: 128 MG/DL
GLOBULIN SER CALC-MCNC: 4.7 G/DL (ref 2–4)
GLUCOSE SERPL-MCNC: 121 MG/DL (ref 74–99)
GLUCOSE UR STRIP.AUTO-MCNC: NEGATIVE MG/DL
HBA1C MFR BLD: 6.1 % (ref 4.5–5.6)
HCT VFR BLD AUTO: 37.1 % (ref 36–48)
HGB BLD-MCNC: 12 G/DL (ref 13–16)
HGB UR QL STRIP: ABNORMAL
INR PPP: 1.4 (ref 0.8–1.2)
KETONES UR QL STRIP.AUTO: NEGATIVE MG/DL
LEUKOCYTE ESTERASE UR QL STRIP.AUTO: NEGATIVE
LYMPHOCYTES # BLD: 2.4 K/UL (ref 0.9–3.6)
LYMPHOCYTES NFR BLD: 23 % (ref 21–52)
MCH RBC QN AUTO: 27.6 PG (ref 24–34)
MCHC RBC AUTO-ENTMCNC: 32.3 G/DL (ref 31–37)
MCV RBC AUTO: 85.5 FL (ref 74–97)
MONOCYTES # BLD: 0.7 K/UL (ref 0.05–1.2)
MONOCYTES NFR BLD: 6 % (ref 3–10)
MUCOUS THREADS URNS QL MICRO: POSITIVE /LPF
NEUTS SEG # BLD: 7.5 K/UL (ref 1.8–8)
NEUTS SEG NFR BLD: 70 % (ref 40–73)
NITRITE UR QL STRIP.AUTO: NEGATIVE
PH UR STRIP: 6 [PH] (ref 5–8)
PLATELET # BLD AUTO: 344 K/UL (ref 135–420)
PMV BLD AUTO: 10.8 FL (ref 9.2–11.8)
POTASSIUM SERPL-SCNC: 3.9 MMOL/L (ref 3.5–5.5)
PROT SERPL-MCNC: 7.6 G/DL (ref 6.4–8.2)
PROT UR STRIP-MCNC: NEGATIVE MG/DL
PROTHROMBIN TIME: 16.6 SEC (ref 11.5–15.2)
RBC # BLD AUTO: 4.34 M/UL (ref 4.7–5.5)
RBC #/AREA URNS HPF: ABNORMAL /HPF (ref 0–5)
SERVICE CMNT-IMP: NORMAL
SODIUM SERPL-SCNC: 136 MMOL/L (ref 136–145)
SP GR UR REFRACTOMETRY: 1.01 (ref 1–1.03)
SPECIMEN EXP DATE BLD: NORMAL
UROBILINOGEN UR QL STRIP.AUTO: 1 EU/DL (ref 0.2–1)
WBC # BLD AUTO: 10.6 K/UL (ref 4.6–13.2)
WBC URNS QL MICRO: ABNORMAL /HPF (ref 0–5)

## 2018-10-19 PROCEDURE — 85025 COMPLETE CBC W/AUTO DIFF WBC: CPT | Performed by: ORTHOPAEDIC SURGERY

## 2018-10-19 PROCEDURE — 87641 MR-STAPH DNA AMP PROBE: CPT | Performed by: ORTHOPAEDIC SURGERY

## 2018-10-19 PROCEDURE — 83036 HEMOGLOBIN GLYCOSYLATED A1C: CPT | Performed by: ORTHOPAEDIC SURGERY

## 2018-10-19 PROCEDURE — 93005 ELECTROCARDIOGRAM TRACING: CPT

## 2018-10-19 PROCEDURE — 81001 URINALYSIS AUTO W/SCOPE: CPT | Performed by: ORTHOPAEDIC SURGERY

## 2018-10-19 PROCEDURE — 36415 COLL VENOUS BLD VENIPUNCTURE: CPT | Performed by: ORTHOPAEDIC SURGERY

## 2018-10-19 PROCEDURE — 80053 COMPREHEN METABOLIC PANEL: CPT | Performed by: ORTHOPAEDIC SURGERY

## 2018-10-19 PROCEDURE — 86900 BLOOD TYPING SEROLOGIC ABO: CPT | Performed by: ORTHOPAEDIC SURGERY

## 2018-10-19 PROCEDURE — 85730 THROMBOPLASTIN TIME PARTIAL: CPT | Performed by: ORTHOPAEDIC SURGERY

## 2018-10-19 PROCEDURE — 85652 RBC SED RATE AUTOMATED: CPT | Performed by: ORTHOPAEDIC SURGERY

## 2018-10-19 PROCEDURE — 86141 C-REACTIVE PROTEIN HS: CPT | Performed by: ORTHOPAEDIC SURGERY

## 2018-10-19 PROCEDURE — 85610 PROTHROMBIN TIME: CPT | Performed by: ORTHOPAEDIC SURGERY

## 2018-10-20 LAB
ATRIAL RATE: 68 BPM
CALCULATED P AXIS, ECG09: 57 DEGREES
CALCULATED R AXIS, ECG10: 51 DEGREES
CALCULATED T AXIS, ECG11: 99 DEGREES
CRP SERPL HS-MCNC: 168.9 MG/L (ref 0–3)
DIAGNOSIS, 93000: NORMAL
P-R INTERVAL, ECG05: 232 MS
Q-T INTERVAL, ECG07: 422 MS
QRS DURATION, ECG06: 96 MS
QTC CALCULATION (BEZET), ECG08: 448 MS
VENTRICULAR RATE, ECG03: 68 BPM

## 2018-10-23 NOTE — H&P
[address] History and Physical Exam 
 
Patient: Elier Baez MRN: [id number]  SSN: [id number] YOB: 1935  Age: [de-identified] y.o. Sex: male Subjective: Chief Complaint: Bilateral knee pain History of Present Illness:  Patient complains of bilateral knee pain and difficulty ambulating. Patient has a history of total knee replacements bilaterally. He developed swelling in both knees. The knees were aspirated and the fluid was sent for analysis. They testing did comes back positive for infection in both knees. Patient denies fever and chills, but did have some night sweats. Past Medical History:  
Diagnosis Date  CAP (community acquired pneumonia)  DI (detrusor instability)  Erectile dysfunction  Hypertension   MI, old 2017  
 occurred during Cardiac Cath  Nausea & vomiting  Prostate cancer [facility])   Urgency of urination  Urinary frequency Past Surgical History:  
Procedure Laterality Date  HX CATARACT REMOVAL Right  HX CHOLECYSTECTOMY  HX HEART CATHETERIZATION  2017  HX HERNIA REPAIR Right   
 inguinal  
 HX KNEE REPLACEMENT Bilateral 2006  HX PROSTATECTOMY Social History Occupational History  Not on file Tobacco Use  Smoking status: Former Smoker Types: Cigarettes Last attempt to quit: 1971 Years since quittin.8  Smokeless tobacco: Never Used Substance and Sexual Activity  Alcohol use: No  
  Frequency: Never  Drug use: No  
 Sexual activity: Yes  
  Partners: Female Prior to Admission medications Medication Sig Start Date End Date Taking? Authorizing Provider  
omeprazole (PRILOSEC) 20 mg capsule Take 20 mg by mouth daily. Provider, Historical  
mirabegron ER (MYRBETRIQ) 25 mg ER tablet Take 2 Tabs by mouth daily for 90 days.  8/3/18 11/1/18  Jessica Dillard MD  
 apixaban (ELIQUIS) 5 mg tablet Take 5 mg by mouth two (2) times a day. Provider, Historical  
atorvastatin (LIPITOR) 40 mg tablet Take 40 mg by mouth nightly. Provider, Historical  
losartan (COZAAR) 25 mg tablet Take  by mouth daily. Provider, Historical  
clopidogrel (PLAVIX) 75 mg tab Take 75 mg by mouth. Provider, Historical  
 
Family history non-contributory. Allergies: No Known Allergies Review of Systems: A comprehensive review of systems was negative. Objective:   
  
Physical Exam: 
HEENT: Normocephalic, atraumatic Lungs:  Clear to auscultation Heart:   Regular rate and rhythm Abdomen: Soft Extremities:  Pain with range of motion of the bilateral knees. Bilateral effusions. Assessment:   
 
 Prosthetic joint infection of bilateral knees. Plan:    
 
Proceed with scheduled bilateral open I&D and poly swap. The various methods of treatment have been discussed with the patient and family. After consideration of risks, benefits, and other options for treatment, the patient has consented to surgical interventions. Questions were answered and preoperative teaching was done by Dr. Hermelindo Valero. Signed By: Abdulkadir Fam PA-C October 23, 2018

## 2018-10-24 ENCOUNTER — ANESTHESIA EVENT (OUTPATIENT)
Dept: SURGERY | Age: 83
DRG: 940 | End: 2018-10-24
Payer: MEDICARE

## 2018-10-24 ENCOUNTER — ANESTHESIA (OUTPATIENT)
Dept: SURGERY | Age: 83
DRG: 940 | End: 2018-10-24
Payer: MEDICARE

## 2018-10-24 ENCOUNTER — HOSPITAL ENCOUNTER (INPATIENT)
Age: 83
LOS: 3 days | Discharge: HOME HEALTH | DRG: 940 | End: 2018-10-27
Attending: ORTHOPAEDIC SURGERY | Admitting: ORTHOPAEDIC SURGERY
Payer: MEDICARE

## 2018-10-24 ENCOUNTER — APPOINTMENT (OUTPATIENT)
Dept: GENERAL RADIOLOGY | Age: 83
DRG: 940 | End: 2018-10-24
Attending: PHYSICIAN ASSISTANT
Payer: MEDICARE

## 2018-10-24 DIAGNOSIS — T84.50XD JOINT PROSTHESIS INFECTION OR INFLAMMATION, SUBSEQUENT ENCOUNTER: Primary | ICD-10-CM

## 2018-10-24 LAB
ANION GAP SERPL CALC-SCNC: 9 MMOL/L (ref 3–18)
BUN SERPL-MCNC: 17 MG/DL (ref 7–18)
BUN/CREAT SERPL: 18
CALCIUM SERPL-MCNC: 8.5 MG/DL (ref 8.5–10.1)
CHLORIDE SERPL-SCNC: 104 MMOL/L (ref 100–108)
CO2 SERPL-SCNC: 25 MMOL/L (ref 21–32)
CREAT SERPL-MCNC: 0.97 MG/DL (ref 0.6–1.3)
GLUCOSE SERPL-MCNC: 102 MG/DL (ref 74–99)
POTASSIUM SERPL-SCNC: 4.6 MMOL/L (ref 3.5–5.5)
SODIUM SERPL-SCNC: 138 MMOL/L (ref 136–145)

## 2018-10-24 PROCEDURE — 0SBD0ZZ EXCISION OF LEFT KNEE JOINT, OPEN APPROACH: ICD-10-PCS | Performed by: ORTHOPAEDIC SURGERY

## 2018-10-24 PROCEDURE — 36415 COLL VENOUS BLD VENIPUNCTURE: CPT | Performed by: PHYSICIAN ASSISTANT

## 2018-10-24 PROCEDURE — 74011250636 HC RX REV CODE- 250/636

## 2018-10-24 PROCEDURE — 74011250636 HC RX REV CODE- 250/636: Performed by: ORTHOPAEDIC SURGERY

## 2018-10-24 PROCEDURE — 77030011265 HC ELECTRD BLD HEX COVD -A: Performed by: ORTHOPAEDIC SURGERY

## 2018-10-24 PROCEDURE — 77030018835 HC SOL IRR LR ICUM -A: Performed by: ORTHOPAEDIC SURGERY

## 2018-10-24 PROCEDURE — 65270000029 HC RM PRIVATE

## 2018-10-24 PROCEDURE — C1776 JOINT DEVICE (IMPLANTABLE): HCPCS | Performed by: ORTHOPAEDIC SURGERY

## 2018-10-24 PROCEDURE — 87070 CULTURE OTHR SPECIMN AEROBIC: CPT | Performed by: ORTHOPAEDIC SURGERY

## 2018-10-24 PROCEDURE — 77030000032 HC CUF TRNQT ZIMM -B: Performed by: ORTHOPAEDIC SURGERY

## 2018-10-24 PROCEDURE — 77030027138 HC INCENT SPIROMETER -A: Performed by: ORTHOPAEDIC SURGERY

## 2018-10-24 PROCEDURE — 3E0U029 INTRODUCTION OF OTHER ANTI-INFECTIVE INTO JOINTS, OPEN APPROACH: ICD-10-PCS | Performed by: ORTHOPAEDIC SURGERY

## 2018-10-24 PROCEDURE — 77030006804 HC BLD SAW RECIP CNMD -B: Performed by: ORTHOPAEDIC SURGERY

## 2018-10-24 PROCEDURE — 77030020782 HC GWN BAIR PAWS FLX 3M -B: Performed by: ORTHOPAEDIC SURGERY

## 2018-10-24 PROCEDURE — 74011250636 HC RX REV CODE- 250/636: Performed by: ANESTHESIOLOGY

## 2018-10-24 PROCEDURE — 74011250636 HC RX REV CODE- 250/636: Performed by: PHYSICIAN ASSISTANT

## 2018-10-24 PROCEDURE — 76942 ECHO GUIDE FOR BIOPSY: CPT | Performed by: ORTHOPAEDIC SURGERY

## 2018-10-24 PROCEDURE — 87075 CULTR BACTERIA EXCEPT BLOOD: CPT | Performed by: ORTHOPAEDIC SURGERY

## 2018-10-24 PROCEDURE — 76010000153 HC OR TIME 1.5 TO 2 HR: Performed by: ORTHOPAEDIC SURGERY

## 2018-10-24 PROCEDURE — 74011000258 HC RX REV CODE- 258: Performed by: PHYSICIAN ASSISTANT

## 2018-10-24 PROCEDURE — 74011000250 HC RX REV CODE- 250: Performed by: ORTHOPAEDIC SURGERY

## 2018-10-24 PROCEDURE — 0SPC08Z REMOVAL OF SPACER FROM RIGHT KNEE JOINT, OPEN APPROACH: ICD-10-PCS | Performed by: ORTHOPAEDIC SURGERY

## 2018-10-24 PROCEDURE — 73560 X-RAY EXAM OF KNEE 1 OR 2: CPT

## 2018-10-24 PROCEDURE — 0SRC0EZ REPLACEMENT OF RIGHT KNEE JOINT WITH ARTICULATING SPACER, OPEN APPROACH: ICD-10-PCS | Performed by: ORTHOPAEDIC SURGERY

## 2018-10-24 PROCEDURE — 77030031139 HC SUT VCRL2 J&J -A: Performed by: ORTHOPAEDIC SURGERY

## 2018-10-24 PROCEDURE — C1713 ANCHOR/SCREW BN/BN,TIS/BN: HCPCS | Performed by: ORTHOPAEDIC SURGERY

## 2018-10-24 PROCEDURE — 74011000250 HC RX REV CODE- 250: Performed by: PHYSICIAN ASSISTANT

## 2018-10-24 PROCEDURE — 76060000034 HC ANESTHESIA 1.5 TO 2 HR: Performed by: ORTHOPAEDIC SURGERY

## 2018-10-24 PROCEDURE — 0SPD08Z REMOVAL OF SPACER FROM LEFT KNEE JOINT, OPEN APPROACH: ICD-10-PCS | Performed by: ORTHOPAEDIC SURGERY

## 2018-10-24 PROCEDURE — 0SRD0EZ REPLACEMENT OF LEFT KNEE JOINT WITH ARTICULATING SPACER, OPEN APPROACH: ICD-10-PCS | Performed by: ORTHOPAEDIC SURGERY

## 2018-10-24 PROCEDURE — 77030020813 HC INST SCULP CEM KT DISP S&N -B: Performed by: ORTHOPAEDIC SURGERY

## 2018-10-24 PROCEDURE — 77030035643 HC BLD SAW OSC PRECIS STRY -C: Performed by: ORTHOPAEDIC SURGERY

## 2018-10-24 PROCEDURE — 77030032490 HC SLV COMPR SCD KNE COVD -B: Performed by: ORTHOPAEDIC SURGERY

## 2018-10-24 PROCEDURE — 77030002933 HC SUT MCRYL J&J -A: Performed by: ORTHOPAEDIC SURGERY

## 2018-10-24 PROCEDURE — 76210000006 HC OR PH I REC 0.5 TO 1 HR: Performed by: ORTHOPAEDIC SURGERY

## 2018-10-24 PROCEDURE — 74011250637 HC RX REV CODE- 250/637: Performed by: PHYSICIAN ASSISTANT

## 2018-10-24 PROCEDURE — 0SBC0ZZ EXCISION OF RIGHT KNEE JOINT, OPEN APPROACH: ICD-10-PCS | Performed by: ORTHOPAEDIC SURGERY

## 2018-10-24 PROCEDURE — 74011000250 HC RX REV CODE- 250

## 2018-10-24 PROCEDURE — 77030013708 HC HNDPC SUC IRR PULS STRY –B: Performed by: ORTHOPAEDIC SURGERY

## 2018-10-24 PROCEDURE — 80048 BASIC METABOLIC PNL TOTAL CA: CPT | Performed by: PHYSICIAN ASSISTANT

## 2018-10-24 PROCEDURE — 3E0T3BZ INTRODUCTION OF ANESTHETIC AGENT INTO PERIPHERAL NERVES AND PLEXI, PERCUTANEOUS APPROACH: ICD-10-PCS | Performed by: ANESTHESIOLOGY

## 2018-10-24 PROCEDURE — 64447 NJX AA&/STRD FEMORAL NRV IMG: CPT | Performed by: ORTHOPAEDIC SURGERY

## 2018-10-24 PROCEDURE — 77030018770: Performed by: ORTHOPAEDIC SURGERY

## 2018-10-24 DEVICE — IMPLANTABLE DEVICE: Type: IMPLANTABLE DEVICE | Site: KNEE | Status: FUNCTIONAL

## 2018-10-24 DEVICE — STIMULAN® RAPID CURE PROVIDED STERILE FOR SINGLE PATIENT USE. STIMULAN® RAPID CURE CONTAINS CALCIUM SULFATE POWDER AND MIXING SOLUTION IN PRE-MEASURED QUANTITIES SO THAT WHEN MIXED TOGETHER IN A STERILE MIXING BOWL, THE RESULTANT PASTE IS TO BE DIGITALLY PACKED INTO OPEN BONE VOID/GAP TO SET INSITU OR PLACED INTO THE MOULD PROVIDED, THE MIXTURE SETS TO FORM BEADS. THE BIODEGRADABLE, RADIOPAQUE BEADS ARE RESORBED IN APPROXIMATELY 30 – 60 DAYS WHEN USED IN ACCORDANCE WITH THE DEVICE LABELLING. STIMULAN® RAPID CURE IS MANUFACTURED FROM SYNTHETIC IMPLANT GRADE CALCIUM SULFATE DIHYDRATE(CASO4.2H2O) THAT RESORBS AND IS REPLACED WITH BONE DURING THE HEALING PROCESS. ALSO, AS THE BONE VOID FILLER BEADS ARE BIODEGRADABLE AND BIOCOMPATIBLE, THEY MAY BE USED AT AN INFECTED SITE.
Type: IMPLANTABLE DEVICE | Site: KNEE | Status: FUNCTIONAL
Brand: STIMULAN® RAPID CURE

## 2018-10-24 RX ORDER — NALOXONE HYDROCHLORIDE 0.4 MG/ML
0.4 INJECTION, SOLUTION INTRAMUSCULAR; INTRAVENOUS; SUBCUTANEOUS AS NEEDED
Status: DISCONTINUED | OUTPATIENT
Start: 2018-10-24 | End: 2018-10-27 | Stop reason: HOSPADM

## 2018-10-24 RX ORDER — MIDAZOLAM HYDROCHLORIDE 1 MG/ML
INJECTION, SOLUTION INTRAMUSCULAR; INTRAVENOUS AS NEEDED
Status: DISCONTINUED | OUTPATIENT
Start: 2018-10-24 | End: 2018-10-24 | Stop reason: HOSPADM

## 2018-10-24 RX ORDER — NALOXONE HYDROCHLORIDE 0.4 MG/ML
0.1 INJECTION, SOLUTION INTRAMUSCULAR; INTRAVENOUS; SUBCUTANEOUS AS NEEDED
Status: DISCONTINUED | OUTPATIENT
Start: 2018-10-24 | End: 2018-10-24 | Stop reason: HOSPADM

## 2018-10-24 RX ORDER — ONDANSETRON 2 MG/ML
4 INJECTION INTRAMUSCULAR; INTRAVENOUS ONCE
Status: DISCONTINUED | OUTPATIENT
Start: 2018-10-24 | End: 2018-10-24 | Stop reason: HOSPADM

## 2018-10-24 RX ORDER — PROPOFOL 10 MG/ML
INJECTION, EMULSION INTRAVENOUS AS NEEDED
Status: DISCONTINUED | OUTPATIENT
Start: 2018-10-24 | End: 2018-10-24 | Stop reason: HOSPADM

## 2018-10-24 RX ORDER — DOCUSATE SODIUM 100 MG/1
100 CAPSULE, LIQUID FILLED ORAL 2 TIMES DAILY
Status: DISCONTINUED | OUTPATIENT
Start: 2018-10-24 | End: 2018-10-27 | Stop reason: HOSPADM

## 2018-10-24 RX ORDER — DEXTROSE 50 % IN WATER (D50W) INTRAVENOUS SYRINGE
25-50 AS NEEDED
Status: DISCONTINUED | OUTPATIENT
Start: 2018-10-24 | End: 2018-10-24 | Stop reason: HOSPADM

## 2018-10-24 RX ORDER — DIPHENHYDRAMINE HYDROCHLORIDE 50 MG/ML
12.5 INJECTION, SOLUTION INTRAMUSCULAR; INTRAVENOUS
Status: DISCONTINUED | OUTPATIENT
Start: 2018-10-24 | End: 2018-10-24 | Stop reason: HOSPADM

## 2018-10-24 RX ORDER — DIPHENHYDRAMINE HYDROCHLORIDE 50 MG/ML
12.5 INJECTION, SOLUTION INTRAMUSCULAR; INTRAVENOUS
Status: DISCONTINUED | OUTPATIENT
Start: 2018-10-24 | End: 2018-10-27 | Stop reason: HOSPADM

## 2018-10-24 RX ORDER — SODIUM CHLORIDE 0.9 % (FLUSH) 0.9 %
5-10 SYRINGE (ML) INJECTION EVERY 8 HOURS
Status: DISCONTINUED | OUTPATIENT
Start: 2018-10-24 | End: 2018-10-27 | Stop reason: HOSPADM

## 2018-10-24 RX ORDER — LIDOCAINE HYDROCHLORIDE 20 MG/ML
INJECTION, SOLUTION EPIDURAL; INFILTRATION; INTRACAUDAL; PERINEURAL AS NEEDED
Status: DISCONTINUED | OUTPATIENT
Start: 2018-10-24 | End: 2018-10-24 | Stop reason: HOSPADM

## 2018-10-24 RX ORDER — LOSARTAN POTASSIUM 25 MG/1
25 TABLET ORAL DAILY
Status: DISCONTINUED | OUTPATIENT
Start: 2018-10-25 | End: 2018-10-27 | Stop reason: HOSPADM

## 2018-10-24 RX ORDER — OXYCODONE HYDROCHLORIDE 5 MG/1
5 TABLET ORAL ONCE
Status: DISCONTINUED | OUTPATIENT
Start: 2018-10-24 | End: 2018-10-24 | Stop reason: HOSPADM

## 2018-10-24 RX ORDER — PHENYLEPHRINE HCL IN 0.9% NACL 1 MG/10 ML
SYRINGE (ML) INTRAVENOUS AS NEEDED
Status: DISCONTINUED | OUTPATIENT
Start: 2018-10-24 | End: 2018-10-24 | Stop reason: HOSPADM

## 2018-10-24 RX ORDER — OXYCODONE AND ACETAMINOPHEN 5; 325 MG/1; MG/1
1 TABLET ORAL
Status: DISCONTINUED | OUTPATIENT
Start: 2018-10-24 | End: 2018-10-26

## 2018-10-24 RX ORDER — DEXAMETHASONE SODIUM PHOSPHATE 4 MG/ML
INJECTION, SOLUTION INTRA-ARTICULAR; INTRALESIONAL; INTRAMUSCULAR; INTRAVENOUS; SOFT TISSUE AS NEEDED
Status: DISCONTINUED | OUTPATIENT
Start: 2018-10-24 | End: 2018-10-24 | Stop reason: HOSPADM

## 2018-10-24 RX ORDER — CEFAZOLIN SODIUM/WATER 2 G/20 ML
2 SYRINGE (ML) INTRAVENOUS EVERY 8 HOURS
Status: DISCONTINUED | OUTPATIENT
Start: 2018-10-24 | End: 2018-10-27 | Stop reason: HOSPADM

## 2018-10-24 RX ORDER — VANCOMYCIN HYDROCHLORIDE 1 G/20ML
INJECTION, POWDER, LYOPHILIZED, FOR SOLUTION INTRAVENOUS AS NEEDED
Status: DISCONTINUED | OUTPATIENT
Start: 2018-10-24 | End: 2018-10-24 | Stop reason: HOSPADM

## 2018-10-24 RX ORDER — RIFAMPIN 600 MG/10ML
INJECTION, POWDER, LYOPHILIZED, FOR SOLUTION INTRAVENOUS AS NEEDED
Status: DISCONTINUED | OUTPATIENT
Start: 2018-10-24 | End: 2018-10-24 | Stop reason: HOSPADM

## 2018-10-24 RX ORDER — MAGNESIUM SULFATE 100 %
4 CRYSTALS MISCELLANEOUS AS NEEDED
Status: DISCONTINUED | OUTPATIENT
Start: 2018-10-24 | End: 2018-10-24 | Stop reason: HOSPADM

## 2018-10-24 RX ORDER — SODIUM CHLORIDE, SODIUM LACTATE, POTASSIUM CHLORIDE, CALCIUM CHLORIDE 600; 310; 30; 20 MG/100ML; MG/100ML; MG/100ML; MG/100ML
150 INJECTION, SOLUTION INTRAVENOUS CONTINUOUS
Status: DISCONTINUED | OUTPATIENT
Start: 2018-10-24 | End: 2018-10-24 | Stop reason: HOSPADM

## 2018-10-24 RX ORDER — OMEPRAZOLE 20 MG/1
20 CAPSULE, DELAYED RELEASE ORAL DAILY
Status: DISCONTINUED | OUTPATIENT
Start: 2018-10-25 | End: 2018-10-27 | Stop reason: HOSPADM

## 2018-10-24 RX ORDER — CEFAZOLIN SODIUM/WATER 2 G/20 ML
2 SYRINGE (ML) INTRAVENOUS ONCE
Status: DISCONTINUED | OUTPATIENT
Start: 2018-10-24 | End: 2018-10-24 | Stop reason: ALTCHOICE

## 2018-10-24 RX ORDER — METOCLOPRAMIDE HYDROCHLORIDE 5 MG/ML
INJECTION INTRAMUSCULAR; INTRAVENOUS AS NEEDED
Status: DISCONTINUED | OUTPATIENT
Start: 2018-10-24 | End: 2018-10-24 | Stop reason: HOSPADM

## 2018-10-24 RX ORDER — CEFAZOLIN SODIUM 1 G/3ML
INJECTION, POWDER, FOR SOLUTION INTRAMUSCULAR; INTRAVENOUS AS NEEDED
Status: DISCONTINUED | OUTPATIENT
Start: 2018-10-24 | End: 2018-10-24 | Stop reason: HOSPADM

## 2018-10-24 RX ORDER — ACETAMINOPHEN 10 MG/ML
1000 INJECTION, SOLUTION INTRAVENOUS ONCE
Status: COMPLETED | OUTPATIENT
Start: 2018-10-24 | End: 2018-10-24

## 2018-10-24 RX ORDER — FENTANYL CITRATE 50 UG/ML
25 INJECTION, SOLUTION INTRAMUSCULAR; INTRAVENOUS AS NEEDED
Status: DISCONTINUED | OUTPATIENT
Start: 2018-10-24 | End: 2018-10-24 | Stop reason: HOSPADM

## 2018-10-24 RX ORDER — VANCOMYCIN HYDROCHLORIDE
1250 EVERY 12 HOURS
Status: DISCONTINUED | OUTPATIENT
Start: 2018-10-24 | End: 2018-10-24 | Stop reason: SDUPTHER

## 2018-10-24 RX ORDER — SODIUM CHLORIDE, SODIUM LACTATE, POTASSIUM CHLORIDE, CALCIUM CHLORIDE 600; 310; 30; 20 MG/100ML; MG/100ML; MG/100ML; MG/100ML
125 INJECTION, SOLUTION INTRAVENOUS CONTINUOUS
Status: DISCONTINUED | OUTPATIENT
Start: 2018-10-24 | End: 2018-10-27 | Stop reason: HOSPADM

## 2018-10-24 RX ORDER — VANCOMYCIN HYDROCHLORIDE
1250 EVERY 12 HOURS
Status: DISCONTINUED | OUTPATIENT
Start: 2018-10-24 | End: 2018-10-27 | Stop reason: HOSPADM

## 2018-10-24 RX ORDER — SODIUM CHLORIDE 0.9 % (FLUSH) 0.9 %
5-10 SYRINGE (ML) INJECTION AS NEEDED
Status: DISCONTINUED | OUTPATIENT
Start: 2018-10-24 | End: 2018-10-24 | Stop reason: HOSPADM

## 2018-10-24 RX ORDER — FENTANYL CITRATE 50 UG/ML
INJECTION, SOLUTION INTRAMUSCULAR; INTRAVENOUS AS NEEDED
Status: DISCONTINUED | OUTPATIENT
Start: 2018-10-24 | End: 2018-10-24 | Stop reason: HOSPADM

## 2018-10-24 RX ORDER — KETAMINE HYDROCHLORIDE 10 MG/ML
INJECTION, SOLUTION INTRAMUSCULAR; INTRAVENOUS AS NEEDED
Status: DISCONTINUED | OUTPATIENT
Start: 2018-10-24 | End: 2018-10-24 | Stop reason: HOSPADM

## 2018-10-24 RX ORDER — ONDANSETRON 2 MG/ML
4 INJECTION INTRAMUSCULAR; INTRAVENOUS
Status: DISCONTINUED | OUTPATIENT
Start: 2018-10-24 | End: 2018-10-27 | Stop reason: HOSPADM

## 2018-10-24 RX ORDER — CLOPIDOGREL BISULFATE 75 MG/1
75 TABLET ORAL DAILY
Status: DISCONTINUED | OUTPATIENT
Start: 2018-10-25 | End: 2018-10-27 | Stop reason: HOSPADM

## 2018-10-24 RX ORDER — SODIUM CHLORIDE 0.9 % (FLUSH) 0.9 %
5-10 SYRINGE (ML) INJECTION AS NEEDED
Status: DISCONTINUED | OUTPATIENT
Start: 2018-10-24 | End: 2018-10-27 | Stop reason: HOSPADM

## 2018-10-24 RX ORDER — ALBUTEROL SULFATE 0.83 MG/ML
2.5 SOLUTION RESPIRATORY (INHALATION) AS NEEDED
Status: DISCONTINUED | OUTPATIENT
Start: 2018-10-24 | End: 2018-10-24 | Stop reason: HOSPADM

## 2018-10-24 RX ORDER — SODIUM CHLORIDE, SODIUM LACTATE, POTASSIUM CHLORIDE, CALCIUM CHLORIDE 600; 310; 30; 20 MG/100ML; MG/100ML; MG/100ML; MG/100ML
125 INJECTION, SOLUTION INTRAVENOUS CONTINUOUS
Status: DISPENSED | OUTPATIENT
Start: 2018-10-24 | End: 2018-10-25

## 2018-10-24 RX ORDER — GLYCOPYRROLATE 0.2 MG/ML
INJECTION INTRAMUSCULAR; INTRAVENOUS AS NEEDED
Status: DISCONTINUED | OUTPATIENT
Start: 2018-10-24 | End: 2018-10-24 | Stop reason: HOSPADM

## 2018-10-24 RX ORDER — ONDANSETRON 2 MG/ML
INJECTION INTRAMUSCULAR; INTRAVENOUS AS NEEDED
Status: DISCONTINUED | OUTPATIENT
Start: 2018-10-24 | End: 2018-10-24 | Stop reason: HOSPADM

## 2018-10-24 RX ORDER — EPHEDRINE SULFATE/0.9% NACL/PF 25 MG/5 ML
SYRINGE (ML) INTRAVENOUS AS NEEDED
Status: DISCONTINUED | OUTPATIENT
Start: 2018-10-24 | End: 2018-10-24 | Stop reason: HOSPADM

## 2018-10-24 RX ORDER — ATORVASTATIN CALCIUM 20 MG/1
40 TABLET, FILM COATED ORAL
Status: DISCONTINUED | OUTPATIENT
Start: 2018-10-24 | End: 2018-10-27 | Stop reason: HOSPADM

## 2018-10-24 RX ADMIN — Medication 10 MG: at 16:47

## 2018-10-24 RX ADMIN — MIDAZOLAM HYDROCHLORIDE 2 MG: 1 INJECTION, SOLUTION INTRAMUSCULAR; INTRAVENOUS at 15:08

## 2018-10-24 RX ADMIN — KETAMINE HYDROCHLORIDE 10 MG: 10 INJECTION, SOLUTION INTRAMUSCULAR; INTRAVENOUS at 16:10

## 2018-10-24 RX ADMIN — FENTANYL CITRATE 25 MCG: 50 INJECTION, SOLUTION INTRAMUSCULAR; INTRAVENOUS at 16:27

## 2018-10-24 RX ADMIN — PROPOFOL 50 MG: 10 INJECTION, EMULSION INTRAVENOUS at 15:47

## 2018-10-24 RX ADMIN — Medication 10 MG: at 17:10

## 2018-10-24 RX ADMIN — Medication 150 MCG: at 16:09

## 2018-10-24 RX ADMIN — KETAMINE HYDROCHLORIDE 10 MG: 10 INJECTION, SOLUTION INTRAMUSCULAR; INTRAVENOUS at 16:49

## 2018-10-24 RX ADMIN — PROPOFOL 150 MG: 10 INJECTION, EMULSION INTRAVENOUS at 15:42

## 2018-10-24 RX ADMIN — Medication 5 MG: at 16:21

## 2018-10-24 RX ADMIN — SODIUM CHLORIDE, SODIUM LACTATE, POTASSIUM CHLORIDE, AND CALCIUM CHLORIDE 125 ML/HR: 600; 310; 30; 20 INJECTION, SOLUTION INTRAVENOUS at 23:28

## 2018-10-24 RX ADMIN — KETAMINE HYDROCHLORIDE 10 MG: 10 INJECTION, SOLUTION INTRAMUSCULAR; INTRAVENOUS at 16:39

## 2018-10-24 RX ADMIN — METOCLOPRAMIDE HYDROCHLORIDE 5 MG: 5 INJECTION INTRAMUSCULAR; INTRAVENOUS at 16:55

## 2018-10-24 RX ADMIN — FENTANYL CITRATE 25 MCG: 50 INJECTION, SOLUTION INTRAMUSCULAR; INTRAVENOUS at 16:43

## 2018-10-24 RX ADMIN — Medication 2 G: at 20:51

## 2018-10-24 RX ADMIN — CEFAZOLIN SODIUM 2 G: 1 INJECTION, POWDER, FOR SOLUTION INTRAMUSCULAR; INTRAVENOUS at 16:40

## 2018-10-24 RX ADMIN — FENTANYL CITRATE 25 MCG: 50 INJECTION, SOLUTION INTRAMUSCULAR; INTRAVENOUS at 16:45

## 2018-10-24 RX ADMIN — Medication 5 MG: at 17:13

## 2018-10-24 RX ADMIN — ONDANSETRON 4 MG: 2 INJECTION INTRAMUSCULAR; INTRAVENOUS at 15:53

## 2018-10-24 RX ADMIN — DOCUSATE SODIUM 100 MG: 100 CAPSULE, LIQUID FILLED ORAL at 20:51

## 2018-10-24 RX ADMIN — VANCOMYCIN HYDROCHLORIDE 1250 MG: 10 INJECTION, POWDER, LYOPHILIZED, FOR SOLUTION INTRAVENOUS at 16:40

## 2018-10-24 RX ADMIN — DEXAMETHASONE SODIUM PHOSPHATE 4 MG: 4 INJECTION, SOLUTION INTRA-ARTICULAR; INTRALESIONAL; INTRAMUSCULAR; INTRAVENOUS; SOFT TISSUE at 15:52

## 2018-10-24 RX ADMIN — OXYCODONE HYDROCHLORIDE AND ACETAMINOPHEN 1 TABLET: 5; 325 TABLET ORAL at 20:55

## 2018-10-24 RX ADMIN — Medication 200 MCG: at 16:42

## 2018-10-24 RX ADMIN — SODIUM CHLORIDE, SODIUM LACTATE, POTASSIUM CHLORIDE, AND CALCIUM CHLORIDE 125 ML/HR: 600; 310; 30; 20 INJECTION, SOLUTION INTRAVENOUS at 12:25

## 2018-10-24 RX ADMIN — FENTANYL CITRATE 25 MCG: 50 INJECTION, SOLUTION INTRAMUSCULAR; INTRAVENOUS at 18:12

## 2018-10-24 RX ADMIN — ACETAMINOPHEN 1000 MG: 10 INJECTION, SOLUTION INTRAVENOUS at 15:45

## 2018-10-24 RX ADMIN — ATORVASTATIN CALCIUM 40 MG: 20 TABLET, FILM COATED ORAL at 23:04

## 2018-10-24 RX ADMIN — Medication 10 MG: at 16:32

## 2018-10-24 RX ADMIN — SODIUM CHLORIDE, SODIUM LACTATE, POTASSIUM CHLORIDE, AND CALCIUM CHLORIDE: 600; 310; 30; 20 INJECTION, SOLUTION INTRAVENOUS at 15:59

## 2018-10-24 RX ADMIN — TRANEXAMIC ACID 1 G: 100 INJECTION, SOLUTION INTRAVENOUS at 15:56

## 2018-10-24 RX ADMIN — FENTANYL CITRATE 25 MCG: 50 INJECTION, SOLUTION INTRAMUSCULAR; INTRAVENOUS at 16:30

## 2018-10-24 RX ADMIN — Medication 10 MG: at 17:01

## 2018-10-24 RX ADMIN — Medication 200 MCG: at 16:17

## 2018-10-24 RX ADMIN — FENTANYL CITRATE 25 MCG: 50 INJECTION, SOLUTION INTRAMUSCULAR; INTRAVENOUS at 18:03

## 2018-10-24 RX ADMIN — Medication 200 MCG: at 16:28

## 2018-10-24 RX ADMIN — Medication 150 MCG: at 16:05

## 2018-10-24 RX ADMIN — SODIUM CHLORIDE, SODIUM LACTATE, POTASSIUM CHLORIDE, AND CALCIUM CHLORIDE: 600; 310; 30; 20 INJECTION, SOLUTION INTRAVENOUS at 17:31

## 2018-10-24 RX ADMIN — GLYCOPYRROLATE 0.2 MG: 0.2 INJECTION INTRAMUSCULAR; INTRAVENOUS at 15:52

## 2018-10-24 RX ADMIN — Medication 100 MCG: at 15:55

## 2018-10-24 RX ADMIN — KETAMINE HYDROCHLORIDE 20 MG: 10 INJECTION, SOLUTION INTRAMUSCULAR; INTRAVENOUS at 15:54

## 2018-10-24 RX ADMIN — LIDOCAINE HYDROCHLORIDE 40 MG: 20 INJECTION, SOLUTION EPIDURAL; INFILTRATION; INTRACAUDAL; PERINEURAL at 15:42

## 2018-10-24 NOTE — ANESTHESIA PROCEDURE NOTES
Peripheral Block Start time: 10/24/2018 3:08 PM 
End time: 10/24/2018 3:17 PM 
Performed by: Néstor Corcoran MD 
Authorized by: Steven Gutierrez MD  
 
 
Pre-procedure: Indications: at surgeon's request, post-op pain management, procedure for pain and primary anesthetic Preanesthetic Checklist: patient identified, risks and benefits discussed, site marked, timeout performed, anesthesia consent given and patient being monitored Block Type:  
Block Type: Adductor canal 
Laterality:  Left and right Monitoring:  Standard ASA monitoring, continuous pulse ox, frequent vital sign checks, heart rate, oxygen and responsive to questions Injection Technique:  Single shot Procedures: ultrasound guided and nerve stimulator Patient Position: supine Prep: chlorhexidine Location:  Mid thigh Needle Type:  Stimuplex Needle Gauge:  20 G Needle Localization:  Anatomical landmarks and ultrasound guidance Assessment: 
Number of attempts:  1 Injection Assessment:  Incremental injection every 5 mL, negative aspiration for CSF, no paresthesia, ultrasound image on chart, local visualized surrounding nerve on ultrasound, negative aspiration for blood and no intravascular symptoms Patient tolerance:  Patient tolerated the procedure well with no immediate complications

## 2018-10-24 NOTE — PROGRESS NOTES
1930 Assumed pt care at this time. Received report from Muna Mcconnell RN. Pt rating pain 7/10. Pt in bed in lowest position with wheels locked. Call bell within reach. Will continue to monitor. 2055 Pt rating pain 8/10. Administered Percocet 5 prn. 
 
2056 Assessment complete. Pt is alert and oriented x 4. Denies SOB and chest pain. Pt lungs clear bilaterally. Capillary refill less than 3 seconds. Pt denies numbness and tingling in all extremities. Stated pain 8/10. Pt has 18 G IV to L upper wrist. Pt has ACE wrap dressings bilaterally, CDI. PLEXI's and TEDs applied to BLE. Pt encouraged to continue use of IS. Pt verbalized understanding. Bilateral ice packs applied. Call light and possessions within reach. Bed in lowest position. Will continue to monitor. 0112 Pt rating pain 6/10. Administered Percocet 5 prn. 
 
0500 Pt rating pain 6/10. Administered Percocet 5 prn. Pt started experiencing nausea, dizziness, and perspiration. Pt denies any chest pain. Administered zofran 4mg prn. Pt requested to sit on edge of bed. 
 
0700 Retrieved fresh ice packs for patient. Pt does not want to put them on at this time. Shift summary: Pt is alert and oriented x 4. Pt had an uneventful shift. Pt voiding sufficient amounts. Pain controlled by PRN medication.

## 2018-10-24 NOTE — PERIOP NOTES
Reviewed PTA medication list with patient/caregiver and patient/caregiver denies any additional medications.  
  
Permission granted by patient for a dual skin assessment. Dual skin assessment completed by Juan Su RN and AUSTIN Rivera RN.

## 2018-10-24 NOTE — INTERVAL H&P NOTE
H&P Update: 
Frankey Hedge was seen and examined. History and physical has been reviewed. The patient has been examined. There have been no significant clinical changes since the completion of the originally dated History and Physical. 
Patient identified by surgeon; surgical site was confirmed by patient and surgeon.  
 
Signed By: Loni Cockayne, MD   
 October 24, 2018 1:40 PM

## 2018-10-24 NOTE — PERIOP NOTES
Right Knee Surgical Incision at 1602, Right Knee Incision closed at 1653. Left Knee Surgical Incision at 1637, Left Knee Incision closed at 1730

## 2018-10-24 NOTE — PROGRESS NOTES
Vancomycin - Pharmacy to Dose Consult provided for this [de-identified]y.o. year old ,male for indication of Bone and Joint Infection. Therapy day 1 Wt Readings from Last 1 Encounters:  
10/24/18 87.6 kg (193 lb 3 oz) Ht Readings from Last 1 Encounters:  
10/24/18 177.8 cm (70\") Dosing Weight:  87.6 kg Date:  10/24/18 Lab Results Component Value Date/Time Creatinine 1.13 10/19/2018 09:24 AM  
 
creatinine clearance:  58.1 ml/min  ( 10/19/18 ) 
 
white blood cell count:  10.6  ( 10/19/18 ) Will continue Vancomycin 1250 mg IV q12hrs. Trough Level after 4-5 doses infused. BMP x 3 ordered. Dose calculated to approximate a therapeutic trough of 15-20 mcg/mL. Pharmacy to follow daily and will make changes to dose and/or frequency based on clinical status. [id number] [facility], [address] [place] [address]

## 2018-10-24 NOTE — PERIOP NOTES
Rich Myers informed, via phone, of the dates of the pt's last Plavix and Eliquis administration. No new orders.

## 2018-10-24 NOTE — ANESTHESIA PREPROCEDURE EVALUATION
Anesthetic History PONV Review of Systems / Medical History Patient summary reviewed, nursing notes reviewed and pertinent labs reviewed Pulmonary Neuro/Psych Cardiovascular Hypertension: well controlled CAD and cardiac stents Exercise tolerance: >4 METS Comments: I discussed the ekgs from oct 19 and jan 2018. I read the readings and the patient can proceed unless symptomatic with heart. Pt was stable in July with rehab until knees became an issue with infection. GI/Hepatic/Renal 
Within defined limits Endo/Other Within defined limits Other Findings Physical Exam 
 
Airway Mallampati: II 
TM Distance: 4 - 6 cm Neck ROM: normal range of motion Mouth opening: Normal 
 
 Cardiovascular Regular rate and rhythm,  S1 and S2 normal,  no murmur, click, rub, or gallop Dental 
No notable dental hx Pulmonary Breath sounds clear to auscultation Abdominal 
GI exam deferred Other Findings Anesthetic Plan ASA: 3 Anesthesia type: general and regional - femoral single shot Post-op pain plan if not by surgeon: peripheral nerve block single Induction: Intravenous Anesthetic plan and risks discussed with: Patient

## 2018-10-24 NOTE — ANESTHESIA POSTPROCEDURE EVALUATION
Procedure(s): BILATERAL REVISION TOTAL KNEE ARTHROPLASTY WITH POLY SWAP, OPEN INCISION AND DEBRIDEMENT. Anesthesia Post Evaluation Multimodal analgesia: multimodal analgesia used between 6 hours prior to anesthesia start to PACU discharge Patient location during evaluation: PACU Patient participation: complete - patient participated Level of consciousness: sleepy but conscious Pain score: 1 Pain management: adequate Airway patency: patent Anesthetic complications: no 
Cardiovascular status: acceptable Respiratory status: acceptable Hydration status: acceptable Visit Vitals /59 Pulse 74 Temp 36.1 °C (97 °F) Resp 21 Ht 5' 10\" (1.778 m) Wt 87.6 kg (193 lb 3 oz) SpO2 94% BMI 27.72 kg/m²

## 2018-10-24 NOTE — ROUTINE PROCESS
[phone] TRANSFER - IN REPORT: 
 
Verbal report received from АЛЕКСАНДР Moran RN on Anthony Jones  being received from PACU for routine post - op. Report consisted of patients Situation, Background, Assessment and  
Recommendations(SBAR). Information from the following report(s) SBAR, Kardex, OR Summary, Intake/Output and MAR was reviewed with the receiving nurse. Opportunity for questions and clarification was provided. Assessment to be completed upon patients arrival to unit and care assumed.

## 2018-10-24 NOTE — PROGRESS NOTES
Patient received by Rey Ellison RN. Patient stable, all questions answered. Date 10/23/18 1900 - 10/24/18 0659(Not Admitted) 10/24/18 0700 - 10/25/18 7662 Shift 6361-0571 24 Hour Total 9364-8695 9782-8463 24 Hour Total  
INTAKE  
I.V.   2000(1.9)  2000 Volume (lactated Ringers infusion)   2000 2000 Shift Total(mL/kg)   6815(33.1)  7983(78.2) OUTPUT Urine   0(0)  0 Urine Occurrence(s)   1 x  1 x Urine Output   0  0 Blood   100  100 Estimated Blood Loss   100  100 Shift Total(mL/kg)   100(1.1)  100(1.1) NET   1900 1900 Weight (kg)   87.6 87.6 87.6 Primary Nurse Nikole Ellis, CHRISTOS and Rey Ellison RN , RN performed a dual skin assessment on this patient No impairment noted

## 2018-10-24 NOTE — NURSE NAVIGATOR
Telephone call to Dr. Darien Argueta, cardiologist regarding EKG changes. Dr. Washington Baugh speaking with him. Awaiting final disposition of case.

## 2018-10-24 NOTE — PROGRESS NOTES
1910 
Bedside and Verbal shift change report given to CELESTE MetcalfRN by Mendez Segura RN. Report included the following information SBAR, Kardex, OR Summary, Intake/Output and MAR. Pt in bed at the lowest position. Call light within reach. Family at bedside. Will continue to monitor.

## 2018-10-24 NOTE — PERIOP NOTES
Family updated with progress in the waiting room, patient to be transferred to 2 S, room 204 for continuity of care.

## 2018-10-24 NOTE — BRIEF OP NOTE
BRIEF OPERATIVE NOTE Date of Procedure: 10/24/2018 Preoperative Diagnosis: BILATERAL KNEE INFECTION S/P TKR Postoperative Diagnosis: BILATERAL KNEE INFECTION S/P TKR Procedure(s): BILATERAL REVISION TOTAL KNEE ARTHROPLASTY WITH POLY SWAP, OPEN INCISION AND DEBRIDMENT Surgeon(s) and Role: Arcadio Umanzor MD - Primary Surgical Assistant: none Surgical Staff: 
Circ-1: Jessica Haas Physician Assistant: Stas Arias PA-C Scrub Tech-2: Daniele Franz; Sloane Clements Event Time In Time Out Incision Start 9005 Incision Close 1730 Anesthesia: General  
Estimated Blood Loss: 100cc Specimens: * No specimens in log * Findings: suspected joint infection Complications: none Implants:  
Implant Name Type Inv. Item Serial No.  Lot No. LRB No. Used Action INSERT TIB FX NSM F 7-10 12MM -- NEXGEN LPS[facility]FLX [facility] - [id number]  INSERT TIB FX NSM F 7-10 12MM -- NEXGEN LPS-FLX [facility] [id number] [facility] [id number] Right 1 Implanted GRAFT BNE PASTE RAPID CUR 10ML -- [employer] - [id number]  GRAFT BNE PASTE RAPID CUR 10ML -- [employer]  [employer] 01/18-[id number]/[id number] Right 1 Implanted GRAFT BNE PASTE RAPID CUR 10ML -- STIM[employer]AN - [id number]  GRAFT BNE PASTE RAPID CUR 10ML -- [employer]AN  [employer] 03/18-R366/[id number] Left 1 Implanted INSERT TIB FX NSM EF 5-6 12MM -- NEXGEN LPS-FLX ART SURF - [id number]  INSERT TIB FX NSM EF 5-6 12MM -- NEXGEN LPS-FLX ART SURF [id number] [employer]  Left 1 Implanted

## 2018-10-25 ENCOUNTER — APPOINTMENT (OUTPATIENT)
Dept: NON INVASIVE DIAGNOSTICS | Age: 83
DRG: 940 | End: 2018-10-25
Attending: HOSPITALIST
Payer: MEDICARE

## 2018-10-25 LAB
ANION GAP SERPL CALC-SCNC: 13 MMOL/L (ref 3–18)
ATRIAL RATE: 67 BPM
BUN SERPL-MCNC: 15 MG/DL (ref 7–18)
BUN/CREAT SERPL: 17
CALCIUM SERPL-MCNC: 10 MG/DL (ref 8.5–10.1)
CALCULATED P AXIS, ECG09: 36 DEGREES
CALCULATED R AXIS, ECG10: 41 DEGREES
CALCULATED T AXIS, ECG11: 47 DEGREES
CHLORIDE SERPL-SCNC: 101 MMOL/L (ref 100–108)
CK MB CFR SERPL CALC: 3 % (ref 0–4)
CK MB CFR SERPL CALC: 3.2 % (ref 0–4)
CK MB CFR SERPL CALC: 3.5 % (ref 0–4)
CK MB SERPL-MCNC: 2.3 NG/ML (ref 5–25)
CK MB SERPL-MCNC: 2.5 NG/ML (ref 5–25)
CK MB SERPL-MCNC: 3 NG/ML (ref 5–25)
CK SERPL-CCNC: 71 U/L (ref 39–308)
CK SERPL-CCNC: 77 U/L (ref 39–308)
CK SERPL-CCNC: 94 U/L (ref 39–308)
CO2 SERPL-SCNC: 23 MMOL/L (ref 21–32)
CREAT SERPL-MCNC: 0.86 MG/DL (ref 0.6–1.3)
DIAGNOSIS, 93000: NORMAL
ECHO AO ARCH DIAM: 2.83 CM
ECHO AO ASC DIAM: 3.96 CM
ECHO AO ROOT DIAM: 3.75 CM
ECHO AV AREA PEAK VELOCITY: 2.4 CM2
ECHO AV AREA VTI: 2.2 CM2
ECHO AV AREA/BSA PEAK VELOCITY: 1.1 CM2/M2
ECHO AV AREA/BSA VTI: 1.1 CM2/M2
ECHO AV MEAN GRADIENT: 5.5 MMHG
ECHO AV PEAK GRADIENT: 11.6 MMHG
ECHO AV PEAK VELOCITY: 170.03 CM/S
ECHO AV REGURGITANT PHT: 493.6 CM
ECHO AV VTI: 32.57 CM
ECHO LA MAJOR AXIS: 5.03 CM
ECHO LA VOL 2C: 76.36 ML (ref 18–58)
ECHO LA VOL 4C: 116.62 ML (ref 18–58)
ECHO LA VOL BP: 101.7 ML (ref 18–58)
ECHO LA VOL/BSA BIPLANE: 49.47 ML/M2 (ref 16–28)
ECHO LA VOLUME INDEX A2C: 37.14 ML/M2 (ref 16–28)
ECHO LA VOLUME INDEX A4C: 56.72 ML/M2 (ref 16–28)
ECHO LV E' LATERAL VELOCITY: 0.04 CM/S
ECHO LV E' SEPTAL VELOCITY: 0.03 CM/S
ECHO LV EDV A2C: 218.7 ML
ECHO LV EDV A4C: 168 ML
ECHO LV EDV BP: 195.9 ML (ref 67–155)
ECHO LV EDV INDEX A4C: 81.7 ML/M2
ECHO LV EDV INDEX BP: 95.3 ML/M2
ECHO LV EDV NDEX A2C: 106.4 ML/M2
ECHO LV EJECTION FRACTION A2C: 36 %
ECHO LV EJECTION FRACTION A4C: 50 %
ECHO LV EJECTION FRACTION BIPLANE: 44.1 % (ref 55–100)
ECHO LV ESV A2C: 141.1 ML
ECHO LV ESV A4C: 84.5 ML
ECHO LV ESV BP: 109.5 ML (ref 22–58)
ECHO LV ESV INDEX A2C: 68.6 ML/M2
ECHO LV ESV INDEX A4C: 41.1 ML/M2
ECHO LV ESV INDEX BP: 53.3 ML/M2
ECHO LV INTERNAL DIMENSION DIASTOLIC: 4.83 CM (ref 4.2–5.9)
ECHO LV INTERNAL DIMENSION SYSTOLIC: 4.12 CM
ECHO LV IVSD: 1.05 CM (ref 0.6–1)
ECHO LV MASS 2D: 205.8 G (ref 88–224)
ECHO LV MASS INDEX 2D: 100.1 G/M2 (ref 49–115)
ECHO LV POSTERIOR WALL DIASTOLIC: 0.98 CM (ref 0.6–1)
ECHO LVOT DIAM: 1.97 CM
ECHO LVOT PEAK GRADIENT: 6.9 MMHG
ECHO LVOT PEAK VELOCITY: 131.63 CM/S
ECHO LVOT VTI: 23.81 CM
ECHO MV A VELOCITY: 53.6 CM/S
ECHO MV AREA PHT: 5.1 CM2
ECHO MV E DECELERATION TIME (DT): 148.2 MS
ECHO MV E VELOCITY: 0.99 CM/S
ECHO MV E/A RATIO: 0.02
ECHO MV E/E' LATERAL: 24.75
ECHO MV E/E' RATIO (AVERAGED): 28.88
ECHO MV E/E' SEPTAL: 33
ECHO MV PRESSURE HALF TIME (PHT): 43 MS
ECHO PULMONARY ARTERY SYSTOLIC PRESSURE (PASP): 49 MMHG
ECHO RA AREA 4C: 16.65 CM2
ECHO RV INTERNAL DIMENSION: 3.73 CM
ECHO RV TAPSE: 2.8 CM (ref 1.5–2)
ECHO TV REGURGITANT MAX VELOCITY: 337.93 CM/S
ECHO TV REGURGITANT PEAK GRADIENT: 45.7 MMHG
ERYTHROCYTE [DISTWIDTH] IN BLOOD BY AUTOMATED COUNT: 15.9 % (ref 11.6–14.5)
GLUCOSE SERPL-MCNC: 129 MG/DL (ref 74–99)
HCT VFR BLD AUTO: 30.1 % (ref 36–48)
HGB BLD-MCNC: 9.6 G/DL (ref 13–16)
MCH RBC QN AUTO: 27.2 PG (ref 24–34)
MCHC RBC AUTO-ENTMCNC: 31.9 G/DL (ref 31–37)
MCV RBC AUTO: 85.3 FL (ref 74–97)
P-R INTERVAL, ECG05: 240 MS
PISA AR MAX VEL: 352.92 CM/S
PLATELET # BLD AUTO: 347 K/UL (ref 135–420)
PMV BLD AUTO: 10.8 FL (ref 9.2–11.8)
POTASSIUM SERPL-SCNC: 4.7 MMOL/L (ref 3.5–5.5)
Q-T INTERVAL, ECG07: 422 MS
QRS DURATION, ECG06: 104 MS
QTC CALCULATION (BEZET), ECG08: 445 MS
RBC # BLD AUTO: 3.53 M/UL (ref 4.7–5.5)
SODIUM SERPL-SCNC: 137 MMOL/L (ref 136–145)
TROPONIN I SERPL-MCNC: 0.23 NG/ML (ref 0–0.04)
TROPONIN I SERPL-MCNC: 0.26 NG/ML (ref 0–0.04)
TROPONIN I SERPL-MCNC: 0.38 NG/ML (ref 0–0.04)
VENTRICULAR RATE, ECG03: 67 BPM
WBC # BLD AUTO: 11.8 K/UL (ref 4.6–13.2)

## 2018-10-25 PROCEDURE — 74011250636 HC RX REV CODE- 250/636: Performed by: PHYSICIAN ASSISTANT

## 2018-10-25 PROCEDURE — 93005 ELECTROCARDIOGRAM TRACING: CPT

## 2018-10-25 PROCEDURE — 74011250636 HC RX REV CODE- 250/636: Performed by: ORTHOPAEDIC SURGERY

## 2018-10-25 PROCEDURE — C8929 TTE W OR WO FOL WCON,DOPPLER: HCPCS

## 2018-10-25 PROCEDURE — 74011250637 HC RX REV CODE- 250/637: Performed by: PHYSICIAN ASSISTANT

## 2018-10-25 PROCEDURE — 97165 OT EVAL LOW COMPLEX 30 MIN: CPT

## 2018-10-25 PROCEDURE — 97161 PT EVAL LOW COMPLEX 20 MIN: CPT

## 2018-10-25 PROCEDURE — 85027 COMPLETE CBC AUTOMATED: CPT | Performed by: PHYSICIAN ASSISTANT

## 2018-10-25 PROCEDURE — 80048 BASIC METABOLIC PNL TOTAL CA: CPT | Performed by: PHYSICIAN ASSISTANT

## 2018-10-25 PROCEDURE — 65660000000 HC RM CCU STEPDOWN

## 2018-10-25 PROCEDURE — 97116 GAIT TRAINING THERAPY: CPT

## 2018-10-25 PROCEDURE — 82550 ASSAY OF CK (CPK): CPT | Performed by: HOSPITALIST

## 2018-10-25 PROCEDURE — 36415 COLL VENOUS BLD VENIPUNCTURE: CPT | Performed by: PHYSICIAN ASSISTANT

## 2018-10-25 RX ORDER — MECLIZINE HCL 12.5 MG 12.5 MG/1
12.5 TABLET ORAL ONCE
Status: COMPLETED | OUTPATIENT
Start: 2018-10-25 | End: 2018-10-25

## 2018-10-25 RX ADMIN — OXYCODONE HYDROCHLORIDE AND ACETAMINOPHEN 1 TABLET: 5; 325 TABLET ORAL at 01:12

## 2018-10-25 RX ADMIN — MECLIZINE 12.5 MG: 12.5 TABLET ORAL at 09:39

## 2018-10-25 RX ADMIN — APIXABAN 5 MG: 5 TABLET, FILM COATED ORAL at 05:14

## 2018-10-25 RX ADMIN — SODIUM CHLORIDE, SODIUM LACTATE, POTASSIUM CHLORIDE, AND CALCIUM CHLORIDE 125 ML/HR: 600; 310; 30; 20 INJECTION, SOLUTION INTRAVENOUS at 22:13

## 2018-10-25 RX ADMIN — Medication 2 G: at 13:58

## 2018-10-25 RX ADMIN — CLOPIDOGREL BISULFATE 75 MG: 75 TABLET ORAL at 09:37

## 2018-10-25 RX ADMIN — Medication 10 ML: at 21:38

## 2018-10-25 RX ADMIN — ATORVASTATIN CALCIUM 40 MG: 20 TABLET, FILM COATED ORAL at 21:26

## 2018-10-25 RX ADMIN — ONDANSETRON 4 MG: 2 INJECTION INTRAMUSCULAR; INTRAVENOUS at 05:00

## 2018-10-25 RX ADMIN — VANCOMYCIN HYDROCHLORIDE 1250 MG: 10 INJECTION, POWDER, LYOPHILIZED, FOR SOLUTION INTRAVENOUS at 17:31

## 2018-10-25 RX ADMIN — OXYCODONE HYDROCHLORIDE AND ACETAMINOPHEN 1 TABLET: 5; 325 TABLET ORAL at 21:37

## 2018-10-25 RX ADMIN — DOCUSATE SODIUM 100 MG: 100 CAPSULE, LIQUID FILLED ORAL at 09:36

## 2018-10-25 RX ADMIN — MIRABEGRON 50 MG: 25 TABLET, FILM COATED, EXTENDED RELEASE ORAL at 21:37

## 2018-10-25 RX ADMIN — OMEPRAZOLE 20 MG: 20 CAPSULE, DELAYED RELEASE ORAL at 09:36

## 2018-10-25 RX ADMIN — ONDANSETRON 4 MG: 2 INJECTION INTRAMUSCULAR; INTRAVENOUS at 10:42

## 2018-10-25 RX ADMIN — OXYCODONE HYDROCHLORIDE AND ACETAMINOPHEN 1 TABLET: 5; 325 TABLET ORAL at 09:36

## 2018-10-25 RX ADMIN — VANCOMYCIN HYDROCHLORIDE 1250 MG: 10 INJECTION, POWDER, LYOPHILIZED, FOR SOLUTION INTRAVENOUS at 05:00

## 2018-10-25 RX ADMIN — OXYCODONE HYDROCHLORIDE AND ACETAMINOPHEN 1 TABLET: 5; 325 TABLET ORAL at 05:00

## 2018-10-25 RX ADMIN — APIXABAN 5 MG: 5 TABLET, FILM COATED ORAL at 17:34

## 2018-10-25 RX ADMIN — OXYCODONE HYDROCHLORIDE AND ACETAMINOPHEN 1 TABLET: 5; 325 TABLET ORAL at 13:57

## 2018-10-25 RX ADMIN — Medication 2 G: at 21:26

## 2018-10-25 RX ADMIN — OXYCODONE HYDROCHLORIDE AND ACETAMINOPHEN 1 TABLET: 5; 325 TABLET ORAL at 17:38

## 2018-10-25 RX ADMIN — Medication 2 G: at 05:00

## 2018-10-25 RX ADMIN — PERFLUTREN 1 ML: 6.52 INJECTION, SUSPENSION INTRAVENOUS at 15:37

## 2018-10-25 RX ADMIN — Medication 10 ML: at 10:47

## 2018-10-25 RX ADMIN — DOCUSATE SODIUM 100 MG: 100 CAPSULE, LIQUID FILLED ORAL at 21:26

## 2018-10-25 NOTE — ROUTINE PROCESS
Bedside and verbal shift change report given to Rachel Boyd RN (oncoming nurse) by Kitty Alcocer RN (offgoing nurse). Report included the following information: SBAR, Kardex, MAR, and recent results.

## 2018-10-25 NOTE — OP NOTES
[facility]  OPERATIVE REPORT    Rachana Javier  MR#: [id number]  : 1935  ACCOUNT #: [de-identified]   DATE OF SERVICE: 10/24/2018    PREOPERATIVE DIAGNOSIS:  Bilateral infected knee replacements. POSTOPERATIVE DIAGNOSIS:  Bilateral infected knee replacements. PROCEDURES PERFORMED:  Bilateral open irrigation and debridement, poly swab and placement of antibiotic beads. SURGEON:  Codey Blake MD    ANESTHESIA:  General.    SPECIMENS REMOVED:  Fluid and tissue from both knees. ESTIMATED BLOOD LOSS:  Less than 100 mL. IMPLANTS:  Polyethylene spacers bilaterally. ASSISTANT:  CYNTHIA Kamara    COMPLICATIONS:  None. BRIEF HISTORY:  The patient is an 72-year-old gentleman who presented to our clinic within the last few weeks with proven bilateral knee infections. He previously had knee replacements done about 14 years ago. We did discuss with him the option of surgical management including  suppressive antibiotics, open irrigation and debridement and a 2 stage revision. We went through the multiple risks and benefits of all these different procedures. He opted to proceed with irrigation and debridement, swapping out the polyethylene spacer and placement of antibiotic beads to see if we could catch or eradicate the infection in this manner by catching it early and treating it this way. He understood and elected to proceed. OPERATIVE COURSE:  The patient was brought to the operating suite, properly identified, placed supine on the operating table and placed under a general anesthetic. Once an adequate level of anesthesia was obtained, we began with the right knee. He was gravity exsanguinated. The tourniquet was elevated 280 mmHg. The previous longitudinal incision was utilized. We dissected down through subcutaneous tissue. The underlying capsule was identified. A medial parapatellar arthrotomy was made in a standard fashion. The joint was exposed.   There was significant purulent fluid. This was sent to the lab for culture. There was significant inflammation within the synovium. A major synovectomy was performed removing synovial tissue from the medial and lateral gutters, the suprapatellar pouch posteriorly, just aggressively removing synovium from throughout the knee. We did remove the polyethylene spacer to access the posterior aspect of the knee. The wound was then copiously irrigated with pulsatile lavage for 3 liters with antibiotic irrigation. A new polyethylene spacer was then placed. Once we felt we had done an adequate debridement, the knee tracked acceptably through range of motion. Antibiotic beads had been prepared on the back table and were then placed into the gutter and suprapatellar pouch and throughout the knee. The tourniquet was deflated. Bleeding was controlled with electrocautery. The capsule was then closed with a #1 Vicryl in a figure-of-eight interrupted fashion. Subcutaneous tissue was closed with 2-0 Vicryl. Skin was closed with 3-0 Monocryl in a running subcuticular fashion. The left knee was then undertaken. We did place down clean drapes and new gloves were applied to all the staff. Upon opening that joint again, purulent fluid was encountered. We did a major synovectomy by removing sharply all of the synovium that we could access including the suprapatellar pouch, medial and lateral gutters and posteriorly. We then removed the polyethylene spacer to access the posterior aspect of the knee joint. After our sharp debridement, the wound was copiously irrigated. When we felt we had an adequate debridement, we then placed a new polyethylene spacer. The knee was again taken through range of motion and proved that he had good stability and good tracking. The tourniquet was deflated. Antibiotic beads were placed. The incision was closed in an identical fashion to the right knee. Sterile compressive dressing was applied. The patient was awakened and transferred to the recovery room in stable condition. All needle, sponge and instrument counts were reported correct at the end of the case.       MD HALEIGH House / DINESH  D: 10/24/2018 17:56     T: 10/25/2018 05:10  JOB #: [id number]

## 2018-10-25 NOTE — CONSULTS
Infectious Disease Consult    Impression:   · Bilat knee PJI infection    Plan:   Vanco/Cefaz and POD #1    Cultures all pending at this point. Your empiric abx selection is excellent; I'll scut the cultures from [facility] (I'm working at [employer] through Sunday), and will modify abx as needed. In interim, he'll need a PICC for 6wks IV. Given hardware retention, I agree with 6wks PO Rifampin as adjunct to IV abx. 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 Dr Cem Oliver will assume ID duty on Monday morning if he is still here. If he goes home over weekend, have [address] [place] [address] Adarsh Mcfadden draw weekly labs for me please qMondays (CBC, CMP, CRP, and vanco level. .if vanco is used) facsimiled to Stevan Murray at [phone]. Dr Zuly Olmedo can see him in [facility] clinic next Wednesday morning to make sure he got home okay and to double-check MICRO/labs before I will be back in clinic on 21NOV18. 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 He had both replaced over a decade ago (2006) and had done well until these past few weeks with incidiuous onset bilat pain/edema with onset of fever/rigors and malaise. Was seen as outpatient and underwent synovial exam that showed likely infection prompting admission yesterday and late debridement last night. Intraoperative note with both sides showing suppuration. Denies any dental issues of late; has h/o CaP several years ago, but has not had any late COLLIER symptoms.     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    Hematuria R31.9    Hypercholesteremia E78.00    Hypertension I10    Impotence of organic origin N52.9    NSTEMI (non-ST elevated myocardial infarction) ([facility]) I21.4    Pseudoaneurysm following procedure ([facility]) I97.89, I72.9    Urgency of urination R39.15    Urinary frequency R35.0    Joint prosthesis infection or inflammation, subsequent encounter T84.50XD     Past Medical History:   Diagnosis Date    CAP (community acquired pneumonia)     DI (detrusor instability)     Erectile dysfunction     Hypertension 1's    MI, old 2017    occurred during Cardiac Cath    Nausea & vomiting     Prostate cancer ([facility] [address]     Urgency of urination     Urinary frequency       History reviewed. No pertinent family history. Social History     Tobacco Use    Smoking status: Former Smoker     Types: Cigarettes     Last attempt to quit: 1971     Years since quittin.8    Smokeless tobacco: Never Used   Substance Use Topics    Alcohol use: No     Frequency: Never     Past Surgical History:   Procedure Laterality Date    HX CATARACT REMOVAL Right     HX CHOLECYSTECTOMY      HX HEART CATHETERIZATION  2017    HX HERNIA REPAIR Right     inguinal    HX KNEE REPLACEMENT Bilateral 2006    HX PROSTATECTOMY        Prior to Admission medications    Medication Sig Start Date End Date Taking? Authorizing Provider   omeprazole (PRILOSEC) 20 mg capsule Take 20 mg by mouth daily. Yes Provider, Historical   mirabegron ER (MYRBETRIQ) 25 mg ER tablet Take 2 Tabs by mouth daily for 90 days. 8/3/18 11/1/18 Yes Zac Smith MD   atorvastatin (LIPITOR) 40 mg tablet Take 40 mg by mouth nightly. Yes Provider, Historical   losartan (COZAAR) 25 mg tablet Take  by mouth daily.    Yes Provider, Historical   apixaban (ELIQUIS) 5 mg tablet Take 5 mg by mouth two (2) times a day. Provider, Historical   clopidogrel (PLAVIX) 75 mg tab Take 75 mg by mouth. Provider, Historical     No Known Allergies     Review of Systems:  Constitutional: negative except for fevers, chills and malaise  Respiratory: negative for cough or sputum  Cardiovascular: negative except for dizziness  Gastrointestinal: negative for nausea, vomiting and diarrhea  Genitourinary:negative for frequency, dysuria, nocturia, hesitancy and decreased stream    Objective:   Blood pressure 105/55, pulse 69, temperature 98.1 °F (36.7 °C), resp. rate 19, height 5' 10\" (1.778 m), weight 87.6 kg (193 lb 3 oz), SpO2 99 %.   Temp (24hrs), Av °F (36.7 °C), Min:97 °F (36.1 °C), Max:98.5 °F (36.9 °C)    Current Facility-Administered Medications   Medication Dose Route Frequency    meclizine (ANTIVERT) tablet 12.5 mg  12.5 mg Oral ONCE    lactated Ringers infusion  125 mL/hr IntraVENous CONTINUOUS    apixaban (ELIQUIS) tablet 5 mg  5 mg Oral BID    atorvastatin (LIPITOR) tablet 40 mg  40 mg Oral QHS    clopidogrel (PLAVIX) tablet 75 mg  75 mg Oral DAILY    losartan (COZAAR) tablet 25 mg  25 mg Oral DAILY    mirabegron ER (MYRBETRIQ) tablet 50 mg  50 mg Oral DAILY    omeprazole (PRILOSEC) capsule 20 mg  20 mg Oral DAILY    lactated Ringers infusion  125 mL/hr IntraVENous CONTINUOUS    sodium chloride (NS) flush 5-10 mL  5-10 mL IntraVENous Q8H    sodium chloride (NS) flush 5-10 mL  5-10 mL IntraVENous PRN    naloxone (NARCAN) injection 0.4 mg  0.4 mg IntraVENous PRN    diphenhydrAMINE (BENADRYL) injection 12.5 mg  12.5 mg IntraVENous Q4H PRN    oxyCODONE-acetaminophen (PERCOCET) 5-325 mg per tablet 1 Tab  1 Tab Oral Q4H PRN    ondansetron (ZOFRAN) injection 4 mg  4 mg IntraVENous Q4H PRN    docusate sodium (COLACE) capsule 100 mg  100 mg Oral BID    vancomycin (VANCOCIN) 1250 mg in  ml infusion  1,250 mg IntraVENous Q12H    Vancomycin - Pharmacokinetic Dosing  1 Each Other Rx Dosing/Monitoring  ceFAZolin (ANCEF) 2 g/20 mL in sterile water IV syringe  2 g IntraVENous Q8H    lactated Ringers infusion  125 mL/hr IntraVENous CONTINUOUS    influenza vaccine 2018-19 (6 mos+)(PF) (FLUARIX QUAD/FLULAVAL QUAD) injection 0.5 mL  0.5 mL IntraMUSCular PRIOR TO DISCHARGE        Exam:    General:  Alert, cooperative, well noursished, well developed, appears stated age; Hard of hearing (hearing aids placed). Eyes:  Sclera anicteric. Pupils equally round and reactive to light. Mouth/Throat: Mucous membranes normal, oral pharynx clear   Neck: Supple   Lungs:   Clear to auscultation bilaterally, good effort   CV:  Regular rate and rhythm,no murmur, click, rub or gallop   Abdomen:   Soft, non-tender.  bowel sounds normal. non-distended   Extremities: No cyanosis or edema   Skin: Skin color, texture, turgor normal. no acute rash or lesions   Lymph nodes: Cervical and supraclavicular normal   Musculoskeletal: Bilat knees OR bandaged (not taken down)   Lines/Devices:  Intact, no erythema, drainage or tenderness   Psych: Alert and oriented, normal mood affect given the setting       Data Review:     All Micro Results     Procedure Component Value Units Date/Time    CULTURE, TISSUE Claretha Glow STAIN [[id number]] Collected:  10/24/18 1606    Order Status:  Completed Specimen:  Knee  Updated:  10/24/18 2350     Special Requests: NO SPECIAL REQUESTS        GRAM STAIN FEW WBC'S         NO ORGANISMS SEEN        Culture result: PENDING    CULTURE, TISSUE W Roberto Pedro [[id number]] Collected:  10/24/18 1639    Order Status:  Completed Specimen:  Knee  Updated:  10/24/18 2350     Special Requests: NO SPECIAL REQUESTS        GRAM STAIN FEW WBC'S         NO ORGANISMS SEEN        Culture result: PENDING    CULTURE, Leticia Good STAIN [[id number]] Collected:  10/24/18 1606    Order Status:  Completed Specimen:  Wound from Knee  Updated:  10/24/18 7759     Special Requests: NO SPECIAL REQUESTS        GRAM STAIN FEW WBC'S         NO ORGANISMS SEEN        Culture result: PENDING    CULTURE, Jerome Horseman STAIN [[id number]] Collected:  10/24/18 1639    Order Status:  Completed Specimen:  Wound from Knee  Updated:  10/24/18 2349     Special Requests: NO SPECIAL REQUESTS        GRAM STAIN FEW WBC'S         NO ORGANISMS SEEN        Culture result: PENDING    CULTURE, ANAEROBIC [[id number]] Collected:  10/24/18 1639    Order Status:  Completed Specimen:  Knee  Updated:  10/24/18 2309    CULTURE, ANAEROBIC [[id number]] Collected:  10/24/18 1606    Order Status:  Completed Specimen:  Knee  Updated:  10/24/18 2309    CULTURE, ANAEROBIC [[id number]] Collected:  10/24/18 1639    Order Status:  Completed Specimen:  Knee  Updated:  10/24/18 2309    CULTURE, ANAEROBIC [[id number]] Collected:  10/24/18 1606    Order Status:  Completed Specimen:  Knee  Updated:  10/24/18 2308          CBC:   Recent Labs     10/25/18  0252   WBC 11.8   RBC 3.53*   HGB 9.6*   HCT 30.1*        CMP:   Recent Labs     10/25/18  0252 10/24/18  1750   * 102*    138   K 4.7 4.6    104   CO2 23 25   BUN 15 17   CREA 0.86 0.97   CA 10.0 8.5   AGAP 13 9   BUCR 17 18     Liver Enzymes: No results for input(s): TP, ALB, TBIL, AP, SGOT, GPT in the last 72 hours.     No lab exists for component: DBIL  Inflammation studies:   Lab Results   Component Value Date/Time    Sed rate, automated 56 (H) 10/19/2018 09:24 AM       Signed By: Lenore Deras MD     October 25, 2018 Continue cardiology consult  Monitor clinically

## 2018-10-25 NOTE — PROGRESS NOTES
Reason for Admission:   BILATERAL REVISION TOTAL KNEE ARTHROPLASTY WITH POLY SWAP, OPEN INCISION AND DEBRIDMENT 
       
RRAT Score:     24 Resources/supports as identified by patient/family:    
             
Top Challenges facing patient (as identified by patient/family and CM): Finances/Medication cost?      Pt wants cost estimate of IV abx Transportation? Support system or lack thereof? Living arrangements? Self-care/ADLs/Cognition? Current Advanced Directive/Advance Care Plan:   
                       
Plan for utilizing home health:    [place] Likelihood of readmission:  yellow Transition of Care Plan:      Chart reviewed, noted pt admission for infection to bilat knees. Spoke with pt and daughter in room. Pt plans discharge home, wife and daughter can assist. Pt and daughter state willingness to to learn IV abx administration. FOC offered, pt chose [address] [place] [phone] for follow up; referral placed with CMS. Pt aware he will have copay for IV abx, would like to have estimate and Agnieszkaara will follow up. Explained that pt will need ID follow up, will schedule as recommended by ID. Pt has RW for home. CM following to coordinate discharge home, pt will need PICC placed and final abx order. Care Management Interventions PCP Verified by CM: Yes Transition of Care Consult (CM Consult): Home Health [address]: No 
Reason Outside [place]: Patient already serviced by other home care/hospice agency([facility]) Discharge Durable Medical Equipment: No 
Physical Therapy Consult: Yes Occupational Therapy Consult: Yes Current Support Network: Lives with Spouse, Family Lives [place], Own Home Confirm Follow Up Transport: Family Plan discussed with Pt/Family/Caregiver: Yes Freedom of Choice Offered: Yes Discharge Location Discharge Placement: Home with home health

## 2018-10-25 NOTE — PROGRESS NOTES
Problem: Falls - Risk of 
Goal: *Absence of Falls Document [facility] Fall Risk and appropriate interventions in the flowsheet. Outcome: Progressing Towards Goal 
Fall Risk Interventions: 
Mobility Interventions: Patient to call before getting OOB, Utilize walker, cane, or other assistive device Mentation Interventions: Adequate sleep, hydration, pain control Medication Interventions: Patient to call before getting OOB, Teach patient to arise slowly Elimination Interventions: Urinal in reach, Call light in reach, Patient to call for help with toileting needs

## 2018-10-25 NOTE — PROGRESS NOTES
Problem: Mobility Impaired (Adult and Pediatric) Goal: *Acute Goals and Plan of Care (Insert Text) Goals to be addressed in 1-4 days: STG 
1. Rolling L to R to L modified independent for positioning. 2. Supine to sit to supine S with HR for meals. 3. Sit to stand to sit S with RW in prep for ambulation. LTG 1. Ambulate >150ft S with RW, WBAT, for home/community mobility. 2. Ascend/descend a >3 stair steps CGA with HR for home entry. 3. Tolerate up in chair 1-2 hours for ADLs. 4. Patient/family to be independent with HEP for follow-up care and safe discharge. Outcome: Progressing Towards Goal 
physical Therapy EVALUATION Patient: Kan Loving (38 y.o. male) Date: 10/25/2018 Primary Diagnosis: BILATERAL KNEE INFECTION S/P TKR Joint prosthesis infection or inflammation, subsequent encounter Procedure(s) (LRB): 
BILATERAL REVISION TOTAL KNEE ARTHROPLASTY WITH POLY SWAP, OPEN INCISION AND DEBRIDEMENT (Bilateral) 1 Day Post-Op Precautions:   Fall, WBAT 
 
ASSESSMENT : 
Based on the objective data described below, the patient presents with lower extremity weakness, decreased gait quality and endurance, impaired bed mobility and transfers, decreased B knee ROM/flexibility, and overall limitations in functional mobility s/p B TKR revision. Pt performed supine to sit with supervision, sit to stand with CGA. Patient ambulated 100 feet with RW, GB applied, WBAT, and SBA. Pt tolerated session well as evidenced by no c/o increased pain, no lightheadedness or dizziness. SPO2 >98% on RA. Patient would benefit from skilled inpatient physical therapy to address deficits, progress as tolerated to achieve long term goals and allow safe discharge. Mark Akbar Patient will benefit from skilled intervention to address the above impairments. Patients rehabilitation potential is considered to be Good Factors which may influence rehabilitation potential include:  
[]         None noted 
[x]         Mental ability/status []         Medical condition 
[]         Home/family situation and support systems 
[]         Safety awareness [x]         Pain tolerance/management 
[]         Other: PLAN : 
Recommendations and Planned Interventions: 
[x]           Bed Mobility Training             [x]    Neuromuscular Re-Education 
[x]           Transfer Training                   []    Orthotic/Prosthetic Training 
[x]           Gait Training                          []    Modalities [x]           Therapeutic Exercises          []    Edema Management/Control 
[x]           Therapeutic Activities            [x]    Patient and Family Training/Education 
[]           Other (comment): Frequency/Duration: Patient will be followed by physical therapy twice daily to address goals. Discharge Recommendations: Home Health Further Equipment Recommendations for Discharge: N/A  
 
SUBJECTIVE:  
Patient stated I am doing pretty good.  OBJECTIVE DATA SUMMARY:  
 
Past Medical History:  
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  
 occurred during Cardiac Cath  Nausea & vomiting  Prostate cancer [facility]) 2008  Urgency of urination  Urinary frequency Past Surgical History:  
Procedure Laterality Date  HX CATARACT REMOVAL Right  HX CHOLECYSTECTOMY  HX HEART CATHETERIZATION  12/2017  HX HERNIA REPAIR Right   
 inguinal  
 HX KNEE REPLACEMENT Bilateral 2006  HX PROSTATECTOMY Barriers to Learning/Limitations: None Compensate with: N/A Prior Level of Function/Home Situation: Independent amb s/AD Home Situation Home Environment: Private residence # Steps to Enter: 3 Rails to Enter: Yes Hand Rails : Bilateral 
One/Two Story Residence: One story Living Alone: No 
Support Systems: Spouse/Significant Other/Partner Patient Expects to be Discharged to[de-identified] Private residence Current DME Used/Available at Home: Walker, rollingStrength:   
Strength: Generally decreased, functional 
Tone & Sensation:  
Tone: Normal 
Sensation: Impaired Range Of Motion: 
AROM: Generally decreased, functional 
PROM: Generally decreased, functional 
Functional Mobility: 
Bed Mobility: 
Supine to Sit: Supervision Sit to Supine: Supervision Transfers: 
Sit to Stand: Contact guard assistance Stand to Sit: Contact guard assistance Balance:  
Sitting: Intact Standing: Intact; With supportAmbulation/Gait Training: 
Distance (ft): 100 Feet (ft) Assistive Device: Gait belt;Walker, rolling Ambulation - Level of Assistance: Stand-by assistance Gait Description (WDL): Exceptions to [facility] Gait Abnormalities: Decreased step clearance; Antalgic Right Side Weight Bearing: As tolerated Left Side Weight Bearing: As tolerated Base of Support: Widened Stance: Weight shift Speed/Shelbi: Slow;Shuffled Step Length: Right shortened;Left shortened Pain: 
Pain Scale 1: Numeric (0 - 10) Pain Intensity 1: 5 Pain Location 1: Knee Pain Orientation 1: Left;Right Pain Description 1: Aching Pain Intervention(s) 1: Medication (see MAR) Activity Tolerance:  
Good Please refer to the flowsheet for vital signs taken during this treatment. After treatment:  
[]         Patient left in no apparent distress sitting up in chair 
[x]         Patient left in no apparent distress in bed 
[x]         Call bell left within reach [x]         Nursing notified 
[]         Caregiver present 
[]         Bed alarm activated COMMUNICATION/EDUCATION:  
[x]         Fall prevention education was provided and the patient/caregiver indicated understanding. [x]         Patient/family have participated as able in goal setting and plan of care. [x]         Patient/family agree to work toward stated goals and plan of care. []         Patient understands intent and goals of therapy, but is neutral about his/her participation. []         Patient is unable to participate in goal setting and plan of care. Thank you for this referral. 
[place] Time Calculation: 25 mins Eval Complexity: History: MEDIUM  Complexity : 1-2 comorbidities / personal factors will impact the outcome/ POC Exam:LOW Complexity : 1-2 Standardized tests and measures addressing body structure, function, activity limitation and / or participation in recreation  Presentation: LOW Complexity : Stable, uncomplicated  Clinical Decision Making:Low Complexity amb >30 ft c/RW, CGA for safety Overall Complexity:LOW  Mobility  Current  CJ= 20-39%   Goal  CI= 1-19%. The severity rating is based on the Level of Assistance required for Functional Mobility and ADLs.

## 2018-10-25 NOTE — PROGRESS NOTES
1700 TRANSFER - IN REPORT: 
 
Verbal report received from Zurdo Loera RN(name) on Elier Baez  being received from [address] [place] [address](unit) for urgent transfer Report consisted of patients Situation, Background, Assessment and  
Recommendations(SBAR). Information from the following report(s) SBAR, Kardex and MAR was reviewed with the receiving nurse. Opportunity for questions and clarification was provided. Assessment completed upon patients arrival to unit and care assumed. Bedside shift change report given to Beny Vora RN (oncoming nurse) by Cristy Redman RN (offgoing nurse). Report included the following information SBAR, Kardex and MAR.

## 2018-10-25 NOTE — PROGRESS NOTES
Problem: Self Care Deficits Care Plan (Adult) Goal: *Acute Goals and Plan of Care (Insert Text) Occupational Therapy Goals Initiated 10/25/2018 within 7 day(s). 1.  Patient will perform lower body dressing with supervision/set-up 2. Patient will perform toilet transfers with supervision/set-up. 3.  Patient will perform all aspects of toileting with supervision/set-up. 4.  Patient will complete standing with supervision/set-up for 5 minutes during ADL to increase activity tolerance for functional activities. Occupational Therapy EVALUATION Patient: Jay Vergara (26 y.o. male) Date: 10/25/2018 Primary Diagnosis: BILATERAL KNEE INFECTION S/P TKR Joint prosthesis infection or inflammation, subsequent encounter Procedure(s) (LRB): 
BILATERAL REVISION TOTAL KNEE ARTHROPLASTY WITH POLY SWAP, OPEN INCISION AND DEBRIDEMENT (Bilateral) 1 Day Post-Op Precautions:  Fall, WBAT 
 
ASSESSMENT : 
Based on the objective data described below, the patient presents with bilateral revision of TKA I and D secondary to infection. Pt completed bed mobility with supervision. Pt refused to complete transfers and mobility this session secondary to pain and nausea. Pt min assist for LB dressing and reported spouse available to assist if needed. Pt education on home safety, fall prevention and ADL technique and demonstrated understanding through verbal feedback. Pt could benefit from OT to increase I with ADLs, transfers, mobility, activity tolerance and strength for functional activity. Patient will benefit from skilled intervention to address the above impairments. Patients rehabilitation potential is considered to be Good Factors which may influence rehabilitation potential include:  
[]             None noted []             Mental ability/status []             Medical condition []             Home/family situation and support systems []             Safety awareness []             Pain tolerance/management 
[]             Other: PLAN : 
Recommendations and Planned Interventions: 
[x]               Self Care Training                  [x]        Therapeutic Activities [x]               Functional Mobility Training    []        Cognitive Retraining 
[x]               Therapeutic Exercises           [x]        Endurance Activities [x]               Balance Training                   []        Neuromuscular Re-Education []               Visual/Perceptual Training     [x]   Home Safety Training 
[x]               Patient Education                 [x]        Family Training/Education []               Other (comment): Frequency/Duration: Patient will be followed by occupational therapy 1-2 times per day/4-7 days per week to address goals. Discharge Recommendations: Home Health Further Equipment Recommendations for Discharge: N/A  
 
SUBJECTIVE:  
Patient stated I can't walk right now I'm hurting to much.  OBJECTIVE DATA SUMMARY:  
 
Past Medical History:  
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  
 occurred during Cardiac Cath  Nausea & vomiting  Prostate cancer [facility]) 2008  Urgency of urination  Urinary frequency Past Surgical History:  
Procedure Laterality Date  HX CATARACT REMOVAL Right  HX CHOLECYSTECTOMY  HX HEART CATHETERIZATION  12/2017  HX HERNIA REPAIR Right   
 inguinal  
 HX KNEE REPLACEMENT Bilateral 2006  HX PROSTATECTOMY Barriers to Learning/Limitations: None Compensate with: visual, verbal, tactile, kinesthetic cues/model Prior Level of Function/Home Situation: I with ADLs prior to admission Home Situation Home Environment: Private residence # Steps to Enter: 3 Rails to Enter: Yes Hand Rails : Bilateral 
One/Two Story Residence: One story Living Alone: No 
 Support Systems: Spouse/Significant Other/Partner Patient Expects to be Discharged to[de-identified] Private residence Current DME Used/Available at Home: Grab bars, Raised toilet seat, Shower chair Tub or Shower Type: Tub/Shower combination []  Right hand dominant   []  Left hand dominantCognitive/Behavioral Status: 
Neurologic State: Alert; Appropriate for age Safety/Judgement: Awareness of environment; Fall prevention Skin: incision on bilateral LE covered with dressing Edema: min edema noted on bilateral LE Vision/Perceptual:    
Corrective Lenses: Glasses Coordination: 
Coordination: Within functional limits Fine Motor Skills-Upper: Left Intact; Right Intact Gross Motor Skills-Upper: Left Intact; Right Intact Balance: 
Sitting: Intact Standing: Intact; With supportStrength: 
Strength: Within functional limits Tone & Sensation: 
Tone: Normal 
Sensation: Intact Range of Motion: 
AROM: Within functional limits PROM: Generally decreased, functional 
Functional Mobility and Transfers for ADLs: 
Bed Mobility: 
Supine to Sit: Supervision Sit to Supine: Supervision Scooting: Supervision Transfers: 
Sit to Stand: Contact guard assistance ADL Assessment: 
Upper Body Dressing: Supervision Lower Body Dressing: Minimum assistance ADL Intervention: 
Cognitive Retraining Safety/Judgement: Awareness of environment; Fall prevention Pain: 
Pain Scale 1: Numeric (0 - 10) Pain Intensity 1: 5 Pain Location 1: Knee Pain Orientation 1: Left;Right Pain Description 1: Aching Pain Intervention(s) 1: Medication (see MAR) Activity Tolerance:  
Fair + Please refer to the flowsheet for vital signs taken during this treatment. After treatment:  
[] Patient left in no apparent distress sitting up in chair 
[x] Patient left in no apparent distress in bed 
[x] Call bell left within reach 
[] Nursing notified 
[] Caregiver present 
[] Bed alarm activated COMMUNICATION/EDUCATION:  
 [x] Home safety education was provided and the patient/caregiver indicated understanding. [x] Patient/family have participated as able in goal setting and plan of care. [x] Patient/family agree to work toward stated goals and plan of care. [] Patient understands intent and goals of therapy, but is neutral about his/her participation. [] Patient is unable to participate in goal setting and plan of care. Thank you for this referral. 
Stephane Chase, OTR/L Time Calculation: 13 mins Carry  Current  CJ= 20-39%  Goal  CI= 1-19%. The severity rating is based on the Level of Assistance required for Functional Mobility and ADLs.

## 2018-10-25 NOTE — CONSULTS
Medicine Consult    Patient:  Scarlet Tapia [de-identified] y.o. male  Asked to evaluate patient by Dr. Della Ayoub  Primary Care Provider:  Alix Herrera NP  Date of Admission:  10/24/2018  Reason for Consult: dizziness, diaphoresis, history of CAD        Assessment/Plan     Patient Active Problem List   Diagnosis Code    Prostate cancer ([facility] C61    Coronary artery disease involving native coronary artery I25.10    Stented coronary artery Z95.5    [facility] and [facility] I50.41    CAP (community acquired pneumonia) J18.9    DI (detrusor instability) N32.81    Hematuria R31.9    Hypercholesteremia E78.00    Hypertension I10    Impotence of organic origin N52.9    NSTEMI (non-ST elevated myocardial infarction) ([facility] I21.4    Pseudoaneurysm following procedure ([facility] I97.89, I72.9    Urgency of urination R39.15    Urinary frequency R35.0    Joint prosthesis infection or inflammation, subsequent encounter T84.50XD       PLAN:      -Monitor hemodynamics and hemoglobin in and postoperative. Monitor for  postoperative anemia. -Monitor kidney function. Avoid nephrotoxins. Gentle hydration.  -Continue blood pressure medications. Monitor for hypotension. If that is  to present, we may need to hold some or all of her blood pressure  medications. -unclear etiology of his symptoms, given history of CAD and stent placements need to rule out ACS. he he is followed by Dr. Darien Argueta. Medical management recommended. Not on beta blocker due to bradycardia. On plavix, eliquis, cozaar, lipitor. Will get EKG and CE, he does not have any chest pain, SOB. He has not symptoms now. He worked with PT/OT this morning and walked with them with no symptoms. -PAF : continue on eliquis.  -Continue lipid lowering therapy. -PJI bilateral knee, ID consulted.  Antibiotics per ID.  -Routine postoperative management, pain control, and deep venous  thrombosis per admitting team.    Thank you for allowing us to participate in this patients care. HPI:   CC:  Clyde Louis is a [de-identified] y.o. male with past medical history significant for HTN, CAD is admitted by orthopedic service for bilateral knee joint infection. He is s/p bilateral revision total knee arthroplasty with I&D. Last night he was laying in bed watching TV and suddenly he suddenly felt dizziness, nausea and subsequent diaphoresis. No associated chest pain, SOB, fever/chills, the episode lasted about 5 mins, he sat on the side of bed and symptoms subsided. He walked with PT/OT this morning and had no symptoms. He has extensive cardiac history. He was admitted here in Dec 2017 and Jan 2018 for NSTEMI, he had PCI to mid LAD using a 3.0 x 22mm Resolute Integrity stent; post stent deploment showed distal dissection s/p 2.75 x 18mm Resolute Integrity stent with good results, loss of small diag distally by Dr. Quan Biswas. He is now followed by Dr. Sophie Garcia [facility]. He had cath on 01/12/2018 mild LM disease with heavily calcified vessels, proximal and mid LAD stents widely patent with late filling of diag from collaterals that supply distal anterolateral wall, poor filling of apical LAD possibly due to diffuse disease or small vessel occlusion. He has ischemic cardiomyopathy last echo 01/23/2018 with decreased LVF with EF of 40%, severe hypokinesis of apical wall. Dilated right atrium. He also had new onset A-fib and was started on eliquis. He was placed on amiodarone but stopped due to bradycardia.                      Past Medical History:   Diagnosis Date    CAP (community acquired pneumonia)     DI (detrusor instability)     Erectile dysfunction     Hypertension 1's    MI, old 12/2017    occurred during Cardiac Cath    Nausea & vomiting     [facility] 2008    Urgency of urination     Urinary frequency      Past Surgical History:   Procedure Laterality Date    HX CATARACT REMOVAL Right     HX CHOLECYSTECTOMY  HX HEART CATHETERIZATION  2017    HX HERNIA REPAIR Right     inguinal    HX KNEE REPLACEMENT Bilateral 2006    HX PROSTATECTOMY        Social History     Tobacco Use    Smoking status: Former Smoker     Types: Cigarettes     Last attempt to quit: 1971     Years since quittin.8    Smokeless tobacco: Never Used   Substance Use Topics    Alcohol use: No     Frequency: Never    Drug use: No     History reviewed. No pertinent family history. No current facility-administered medications on file prior to encounter. Current Outpatient Medications on File Prior to Encounter   Medication Sig Dispense Refill    mirabegron ER (MYRBETRIQ) 25 mg ER tablet Take 2 Tabs by mouth daily for 90 days. 180 Tab 0    atorvastatin (LIPITOR) 40 mg tablet Take 40 mg by mouth nightly.  losartan (COZAAR) 25 mg tablet Take  by mouth daily.  apixaban (ELIQUIS) 5 mg tablet Take 5 mg by mouth two (2) times a day.  clopidogrel (PLAVIX) 75 mg tab Take 75 mg by mouth. No Known Allergies        Review of Systems  Constitutional: No fever, chills, diaphoresis, malaise, fatigue or weight gain/loss or falls  Skin: no itching or rashes  HEENT: no ear discomfort, hearing loss, tinnitus, epistaxis or sore throat  EYES: no blurry vision, double vision or photophobia  CARDIOVASCULAR:see above  PULMONARY: no cough, wheeze, shortness of breath or sputum production  GI: no nausea, vomiting, diarrhea, abdominal pain, melena, hematemesis or brbpr  : no dysuria, hematuria  MUSCULOSKELETAL: no back pain, joint pain or myalgias  ENDOCRINE: no heat/cold intolerance, polyuria or polydipsia  HEME: no easy bruising or bleeding  NEURO: no unilateral weakness, numbness, tingling or seizures      Physical Exam:      Visit Vitals  /55   Pulse 69   Temp 98.1 °F (36.7 °C)   Resp 19   Ht 5' 10\" (1.778 m)   Wt 87.6 kg (193 lb 3 oz)   SpO2 99%   BMI 27.72 kg/m²     Body mass index is 27.72 kg/m².     Physical Exam:  GEN: well nourished, laying in bed in no acute distress  HEENT: atraumatic, nose normal,oropharynx clear, MMM  NECK: supple, trachea midline, no supraclavicular or submandibular adenopathy noted  EYES: conjuctiva normal, lids with out lesions, PERRL  HEART: RRR 2/6 systolic murmur, pulses 2+ distally  LUNGS: equal chest wall expansion, cta bl with out wheezes/rales or rhonchi  AB: soft, +BS, nt/nd no organomegaly  NEURO: alert, awake and oriented x3, gait not assessed, cranial nerves intact, strength 5/5 bl UE and LE, sensation intact, reflexes nonpathological  SKIN: dry, intact, warm no breakdown noted        Laboratory Studies:    Recent Results (from the past 24 hour(s))   CULTURE, WOUND W GRAM STAIN    Collection Time: 10/24/18  4:06 PM   Result Value Ref Range    Special Requests: NO SPECIAL REQUESTS      GRAM STAIN FEW WBC'S      GRAM STAIN NO ORGANISMS SEEN      Culture result: PENDING    CULTURE, TISSUE W GRAM STAIN    Collection Time: 10/24/18  4:06 PM   Result Value Ref Range    Special Requests: NO SPECIAL REQUESTS      GRAM STAIN FEW WBC'S      GRAM STAIN NO ORGANISMS SEEN      Culture result: PENDING    CULTURE, WOUND W GRAM STAIN    Collection Time: 10/24/18  4:39 PM   Result Value Ref Range    Special Requests: NO SPECIAL REQUESTS      GRAM STAIN FEW WBC'S      GRAM STAIN NO ORGANISMS SEEN      Culture result: PENDING    CULTURE, TISSUE W GRAM STAIN    Collection Time: 10/24/18  4:39 PM   Result Value Ref Range    Special Requests: NO SPECIAL REQUESTS      GRAM STAIN FEW WBC'S      GRAM STAIN NO ORGANISMS SEEN      Culture result: PENDING    METABOLIC PANEL, BASIC    Collection Time: 10/24/18  5:50 PM   Result Value Ref Range    Sodium 138 136 - 145 mmol/L    Potassium 4.6 3.5 - 5.5 mmol/L    Chloride 104 100 - 108 mmol/L    CO2 25 21 - 32 mmol/L    Anion gap 9 3.0 - 18 mmol/L    Glucose 102 (H) 74 - 99 mg/dL    BUN 17 7.0 - 18 MG/DL    Creatinine 0.97 0.6 - 1.3 MG/DL    BUN/Creatinine ratio 18      GFR est AA >60 >60 ml/min/1.73m2    GFR est non-AA >60 >60 ml/min/1.73m2    Calcium 8.5 8.5 - 84.6 MG/DL   METABOLIC PANEL, BASIC    Collection Time: 10/25/18  2:52 AM   Result Value Ref Range    Sodium 137 136 - 145 mmol/L    Potassium 4.7 3.5 - 5.5 mmol/L    Chloride 101 100 - 108 mmol/L    CO2 23 21 - 32 mmol/L    Anion gap 13 3.0 - 18 mmol/L    Glucose 129 (H) 74 - 99 mg/dL    BUN 15 7.0 - 18 MG/DL    Creatinine 0.86 0.6 - 1.3 MG/DL    BUN/Creatinine ratio 17      GFR est AA >60 >60 ml/min/1.73m2    GFR est non-AA >60 >60 ml/min/1.73m2    Calcium 10.0 8.5 - 10.1 MG/DL   CBC W/O DIFF    Collection Time: 10/25/18  2:52 AM   Result Value Ref Range    WBC 11.8 4.6 - 13.2 K/uL    RBC 3.53 (L) 4.70 - 5.50 M/uL    HGB 9.6 (L) 13.0 - 16.0 g/dL    HCT 30.1 (L) 36.0 - 48.0 %    MCV 85.3 74.0 - 97.0 FL    MCH 27.2 24.0 - 34.0 PG    MCHC 31.9 31.0 - 37.0 g/dL    RDW 15.9 (H) 11.6 - 14.5 %    PLATELET 959 045 - 803 K/uL    MPV 10.8 9.2 - 11.8 FL   CARDIAC PANEL,(CK, CKMB & TROPONIN)    Collection Time: 10/25/18  7:30 AM   Result Value Ref Range    CK 77 39 - 308 U/L    CK - MB 2.3 <3.6 ng/ml    CK-MB Index 3.0 0.0 - 4.0 %    Troponin-I, Qt. 0.23 (H) 0.0 - 0.045 NG/ML   EKG, 12 LEAD, INITIAL    Collection Time: 10/25/18  9:21 AM   Result Value Ref Range    Ventricular Rate 67 BPM    Atrial Rate 67 BPM    P-R Interval 240 ms    QRS Duration 104 ms    Q-T Interval 422 ms    QTC Calculation (Bezet) 445 ms    Calculated P Axis 36 degrees    Calculated R Axis 41 degrees    Calculated T Axis 47 degrees    Diagnosis       Sinus rhythm with 1st degree AV block  Inferior infarct (cited on or before 10-EVA-2018)  Anterolateral infarct (cited on or before 11-JAN-2018)  Abnormal ECG  When compared with ECG of 19-OCT-2018 09:18,  No significant change was found

## 2018-10-25 NOTE — PROGRESS NOTES
Problem: Mobility Impaired (Adult and Pediatric) Goal: *Acute Goals and Plan of Care (Insert Text) Goals to be addressed in 1-4 days: STG 
1. Rolling L to R to L modified independent for positioning. 2. Supine to sit to supine S with HR for meals. 3. Sit to stand to sit S with RW in prep for ambulation. LTG 1. Ambulate >150ft S with RW, WBAT, for home/community mobility. 2. Ascend/descend a >3 stair steps CGA with HR for home entry. 3. Tolerate up in chair 1-2 hours for ADLs. 4. Patient/family to be independent with HEP for follow-up care and safe discharge. Outcome: Progressing Towards Goal 
physical Therapy TREATMENT Patient: Carletha Barthel (20 y.o. male) Date: 10/25/2018 Diagnosis: BILATERAL KNEE INFECTION S/P TKR Joint prosthesis infection or inflammation, subsequent encounter <principal problem not specified> Procedure(s) (LRB): 
BILATERAL REVISION TOTAL KNEE ARTHROPLASTY WITH POLY SWAP, OPEN INCISION AND DEBRIDEMENT (Bilateral) 1 Day Post-Op Precautions: Fall, WBAT Chart, physical therapy assessment, plan of care and goals were reviewed. ASSESSMENT: 
Pt to transfer to tele soon. However willing to participate. Pt agitated and reporting increase pain to B LE. Amb distance limited as a result. Assisted pt back to supine. Cont POC. Progression toward goals: 
[]      Improving appropriately and progressing toward goals [x]      Improving slowly and progressing toward goals 
[]      Not making progress toward goals and plan of care will be adjusted PLAN: 
Patient continues to benefit from skilled intervention to address the above impairments. Continue treatment per established plan of care. Discharge Recommendations:  Home Health Further Equipment Recommendations for Discharge:  rolling walker SUBJECTIVE:  
Patient stated  I just want to go home. I'm about to sign myself out [facility] OBJECTIVE DATA SUMMARY:  
Critical Behavior: 
Neurologic State: Alert, Appropriate for age Safety/Judgement: Awareness of environment, Fall prevention Functional Mobility Training: 
Bed Mobility: 
Supine to Sit: Supervision Sit to Supine: Supervision Scooting: Supervision Transfers: 
Sit to Stand: Contact guard assistance Stand to Sit: Contact guard assistance Balance: 
Sitting: Intact Standing: Intact; With supportAmbulation/Gait Training: 
Distance (ft): 50 Feet (ft) Assistive Device: Gait belt;Walker, rolling Ambulation - Level of Assistance: Contact guard assistance Gait Description (WDL): Exceptions to [facility] Gait Abnormalities: Antalgic;Decreased step clearance Right Side Weight Bearing: As tolerated Left Side Weight Bearing: As tolerated Base of Support: Widened Stance: Weight shift Speed/Shelbi: Slow Step Length: Right shortened;Left shortened Pain: 
Pain Scale 1: Numeric (0 - 10) Pain Intensity 1: 6 Activity Tolerance:  
Fair After treatment:  
[] Patient left in no apparent distress sitting up in chair 
[x] Patient left in no apparent distress in bed 
[x] Call bell left within reach 
[] Nursing notified 
[] Caregiver present 
[] Bed alarm activated Virginia Loving PTA Time Calculation: 15 mins

## 2018-10-25 NOTE — PROGRESS NOTES
Progress Note Patient: Cali Campbell MRN: [id number]  SSN: [id number] YOB: 1935  Age: [de-identified] y.o. Sex: male 1 Day Post-Op status post Procedure(s) (LRB): 
BILATERAL REVISION TOTAL KNEE ARTHROPLASTY WITH POLY SWAP, OPEN INCISION AND DEBRIDEMENT (Bilateral) Admit Date: 10/24/2018 Admit Diagnosis: BILATERAL KNEE INFECTION S/P TKR Joint prosthesis infection or inflammation, subsequent encounter Subjective:   
 
Stated he is dizzy and had an episode of diaphoresis last night. No SOB. No Chest Pain. No Nausea or Vomiting. No problems eating or voiding. Objective:  
  
 
Temp (24hrs), Av.9 °F (36.6 °C), Min:97 °F (36.1 °C), Max:98.5 °F (36.9 °C) Body mass index is 27.72 kg/m². Patient Vitals for the past 12 hrs: 
 BP Temp Pulse Resp SpO2  
10/25/18 0315 110/66 97.8 °F (36.6 °C) 64 18 98 % 10/24/18 2259 121/81 98.2 °F (36.8 °C) 78 20 100 % 10/24/18 2200 112/57 98.5 °F (36.9 °C) 70 14 97 % 10/24/18 2100 105/61 98.4 °F (36.9 °C) 67 14 97 % 10/24/18 2000 111/57 98.2 °F (36.8 °C) 70 14 100 % Recent Labs 10/25/18 
0252 HGB 9.6* HCT 30.1*   
K 4.7  CO2 23 BUN 15  
CREA 0.86 * Physical Exam: 
Vital Signs are Stable. No Acute Distress. Alert and Oriented. Negative Homans sign. Toes AROM Full. Neurovascular exam is normal.   
Dressing is Clean, Dry, and Intact. Assessment/Plan: 1. Continue oob/rehab 2. Dressing change today 3. Continue PT/OT 4. Continue ROM 5. Infectious disease consult today, I discussed case with Dr. Christine Garvin last night. 6. Hospitalist consult today due to diaphoresis and vertigo. Tiger text was sent to Dr. Devorah Reza. 7. Discharge planning 8. Plavix and Eliquis for DVT prophylaxis. Discharge Plan: Home with Home Health Signed By: Mary Bridges PA-C 2018

## 2018-10-25 NOTE — PROGRESS NOTES
Patient was visited by Spiritual care Volunteer who offered Pastoral Care support, and provided Spiritual Care brochure and Spiritual wellness packet. Chaplains remain available to provide spiritual support as needed and requested. Rev. Franci Badillo

## 2018-10-26 ENCOUNTER — APPOINTMENT (OUTPATIENT)
Dept: INTERVENTIONAL RADIOLOGY/VASCULAR | Age: 83
DRG: 940 | End: 2018-10-26
Attending: PHYSICIAN ASSISTANT
Payer: MEDICARE

## 2018-10-26 LAB
ANION GAP SERPL CALC-SCNC: 13 MMOL/L (ref 3–18)
BUN SERPL-MCNC: 10 MG/DL (ref 7–18)
BUN/CREAT SERPL: 13
CALCIUM SERPL-MCNC: 11.8 MG/DL (ref 8.5–10.1)
CHLORIDE SERPL-SCNC: 99 MMOL/L (ref 100–108)
CK MB CFR SERPL CALC: 3.3 % (ref 0–4)
CK MB SERPL-MCNC: 2.3 NG/ML (ref 5–25)
CK SERPL-CCNC: 69 U/L (ref 39–308)
CO2 SERPL-SCNC: 26 MMOL/L (ref 21–32)
CREAT SERPL-MCNC: 0.78 MG/DL (ref 0.6–1.3)
ERYTHROCYTE [DISTWIDTH] IN BLOOD BY AUTOMATED COUNT: 15.8 % (ref 11.6–14.5)
GLUCOSE SERPL-MCNC: 100 MG/DL (ref 74–99)
HCT VFR BLD AUTO: 29.5 % (ref 36–48)
HGB BLD-MCNC: 9.4 G/DL (ref 13–16)
MCH RBC QN AUTO: 27.3 PG (ref 24–34)
MCHC RBC AUTO-ENTMCNC: 31.9 G/DL (ref 31–37)
MCV RBC AUTO: 85.8 FL (ref 74–97)
PLATELET # BLD AUTO: 373 K/UL (ref 135–420)
PMV BLD AUTO: 10.8 FL (ref 9.2–11.8)
POTASSIUM SERPL-SCNC: 4.4 MMOL/L (ref 3.5–5.5)
RBC # BLD AUTO: 3.44 M/UL (ref 4.7–5.5)
SODIUM SERPL-SCNC: 138 MMOL/L (ref 136–145)
TROPONIN I SERPL-MCNC: 0.3 NG/ML (ref 0–0.04)
WBC # BLD AUTO: 8.2 K/UL (ref 4.6–13.2)

## 2018-10-26 PROCEDURE — 74011250636 HC RX REV CODE- 250/636: Performed by: PHYSICIAN ASSISTANT

## 2018-10-26 PROCEDURE — 86481 TB AG RESPONSE T-CELL SUSP: CPT | Performed by: PHYSICIAN ASSISTANT

## 2018-10-26 PROCEDURE — 85027 COMPLETE CBC AUTOMATED: CPT | Performed by: PHYSICIAN ASSISTANT

## 2018-10-26 PROCEDURE — 65660000000 HC RM CCU STEPDOWN

## 2018-10-26 PROCEDURE — 97110 THERAPEUTIC EXERCISES: CPT

## 2018-10-26 PROCEDURE — 74011250637 HC RX REV CODE- 250/637: Performed by: PHYSICIAN ASSISTANT

## 2018-10-26 PROCEDURE — 82550 ASSAY OF CK (CPK): CPT | Performed by: INTERNAL MEDICINE

## 2018-10-26 PROCEDURE — 02HV33Z INSERTION OF INFUSION DEVICE INTO SUPERIOR VENA CAVA, PERCUTANEOUS APPROACH: ICD-10-PCS | Performed by: RADIOLOGY

## 2018-10-26 PROCEDURE — 97530 THERAPEUTIC ACTIVITIES: CPT

## 2018-10-26 PROCEDURE — 74011250636 HC RX REV CODE- 250/636: Performed by: RADIOLOGY

## 2018-10-26 PROCEDURE — 36415 COLL VENOUS BLD VENIPUNCTURE: CPT | Performed by: PHYSICIAN ASSISTANT

## 2018-10-26 PROCEDURE — 97116 GAIT TRAINING THERAPY: CPT

## 2018-10-26 PROCEDURE — 74011250636 HC RX REV CODE- 250/636: Performed by: ORTHOPAEDIC SURGERY

## 2018-10-26 PROCEDURE — 76937 US GUIDE VASCULAR ACCESS: CPT

## 2018-10-26 PROCEDURE — 80048 BASIC METABOLIC PNL TOTAL CA: CPT | Performed by: PHYSICIAN ASSISTANT

## 2018-10-26 RX ORDER — MECLIZINE HCL 12.5 MG 12.5 MG/1
25 TABLET ORAL
Status: DISCONTINUED | OUTPATIENT
Start: 2018-10-26 | End: 2018-10-27 | Stop reason: HOSPADM

## 2018-10-26 RX ORDER — HEPARIN SODIUM 200 [USP'U]/100ML
500 INJECTION, SOLUTION INTRAVENOUS
Status: COMPLETED | OUTPATIENT
Start: 2018-10-26 | End: 2018-10-26

## 2018-10-26 RX ORDER — HYDROMORPHONE HYDROCHLORIDE 2 MG/1
2 TABLET ORAL
Status: DISCONTINUED | OUTPATIENT
Start: 2018-10-26 | End: 2018-10-27 | Stop reason: HOSPADM

## 2018-10-26 RX ORDER — LIDOCAINE HYDROCHLORIDE 10 MG/ML
1-5 INJECTION, SOLUTION EPIDURAL; INFILTRATION; INTRACAUDAL; PERINEURAL
Status: COMPLETED | OUTPATIENT
Start: 2018-10-26 | End: 2018-10-26

## 2018-10-26 RX ORDER — HEPARIN SODIUM (PORCINE) LOCK FLUSH IV SOLN 100 UNIT/ML 100 UNIT/ML
500 SOLUTION INTRAVENOUS
Status: COMPLETED | OUTPATIENT
Start: 2018-10-26 | End: 2018-10-26

## 2018-10-26 RX ORDER — HEPARIN 100 UNIT/ML
300-500 SYRINGE INTRAVENOUS AS NEEDED
Status: DISCONTINUED | OUTPATIENT
Start: 2018-10-26 | End: 2018-10-27 | Stop reason: HOSPADM

## 2018-10-26 RX ORDER — HEPARIN SODIUM 200 [USP'U]/100ML
INJECTION, SOLUTION INTRAVENOUS
Status: DISPENSED
Start: 2018-10-26 | End: 2018-10-27

## 2018-10-26 RX ORDER — LIDOCAINE HYDROCHLORIDE 10 MG/ML
INJECTION, SOLUTION EPIDURAL; INFILTRATION; INTRACAUDAL; PERINEURAL
Status: DISPENSED
Start: 2018-10-26 | End: 2018-10-27

## 2018-10-26 RX ORDER — HYDROMORPHONE HYDROCHLORIDE 2 MG/1
2 TABLET ORAL
Qty: 40 TAB | Refills: 0 | Status: SHIPPED | OUTPATIENT
Start: 2018-10-26 | End: 2019-03-18

## 2018-10-26 RX ORDER — HEPARIN SODIUM (PORCINE) LOCK FLUSH IV SOLN 100 UNIT/ML 100 UNIT/ML
SOLUTION INTRAVENOUS
Status: DISPENSED
Start: 2018-10-26 | End: 2018-10-27

## 2018-10-26 RX ADMIN — OMEPRAZOLE 20 MG: 20 CAPSULE, DELAYED RELEASE ORAL at 08:50

## 2018-10-26 RX ADMIN — DOCUSATE SODIUM 100 MG: 100 CAPSULE, LIQUID FILLED ORAL at 08:50

## 2018-10-26 RX ADMIN — OXYCODONE HYDROCHLORIDE AND ACETAMINOPHEN 1 TABLET: 5; 325 TABLET ORAL at 02:03

## 2018-10-26 RX ADMIN — HEPARIN SODIUM 1000 UNITS: 200 INJECTION, SOLUTION INTRAVENOUS at 13:16

## 2018-10-26 RX ADMIN — SODIUM CHLORIDE, SODIUM LACTATE, POTASSIUM CHLORIDE, AND CALCIUM CHLORIDE 125 ML/HR: 600; 310; 30; 20 INJECTION, SOLUTION INTRAVENOUS at 06:12

## 2018-10-26 RX ADMIN — Medication 10 ML: at 14:00

## 2018-10-26 RX ADMIN — Medication 2 G: at 21:00

## 2018-10-26 RX ADMIN — VANCOMYCIN HYDROCHLORIDE 1250 MG: 10 INJECTION, POWDER, LYOPHILIZED, FOR SOLUTION INTRAVENOUS at 18:49

## 2018-10-26 RX ADMIN — Medication 2 G: at 06:20

## 2018-10-26 RX ADMIN — LOSARTAN POTASSIUM 25 MG: 25 TABLET ORAL at 08:50

## 2018-10-26 RX ADMIN — SODIUM CHLORIDE, SODIUM LACTATE, POTASSIUM CHLORIDE, AND CALCIUM CHLORIDE 125 ML/HR: 600; 310; 30; 20 INJECTION, SOLUTION INTRAVENOUS at 18:39

## 2018-10-26 RX ADMIN — HYDROMORPHONE HYDROCHLORIDE 2 MG: 2 TABLET ORAL at 19:33

## 2018-10-26 RX ADMIN — MECLIZINE 25 MG: 12.5 TABLET ORAL at 15:06

## 2018-10-26 RX ADMIN — Medication 10 ML: at 06:00

## 2018-10-26 RX ADMIN — LIDOCAINE HYDROCHLORIDE 3 ML: 10 INJECTION, SOLUTION EPIDURAL; INFILTRATION; INTRACAUDAL; PERINEURAL at 13:16

## 2018-10-26 RX ADMIN — DOCUSATE SODIUM 100 MG: 100 CAPSULE, LIQUID FILLED ORAL at 23:40

## 2018-10-26 RX ADMIN — Medication 2 G: at 15:53

## 2018-10-26 RX ADMIN — ATORVASTATIN CALCIUM 40 MG: 20 TABLET, FILM COATED ORAL at 23:40

## 2018-10-26 RX ADMIN — OXYCODONE HYDROCHLORIDE AND ACETAMINOPHEN 1 TABLET: 5; 325 TABLET ORAL at 06:13

## 2018-10-26 RX ADMIN — VANCOMYCIN HYDROCHLORIDE 1250 MG: 10 INJECTION, POWDER, LYOPHILIZED, FOR SOLUTION INTRAVENOUS at 06:19

## 2018-10-26 RX ADMIN — OXYCODONE HYDROCHLORIDE AND ACETAMINOPHEN 1 TABLET: 5; 325 TABLET ORAL at 10:31

## 2018-10-26 RX ADMIN — HEPARIN SODIUM (PORCINE) LOCK FLUSH IV SOLN 100 UNIT/ML 500 UNITS: 100 SOLUTION at 13:32

## 2018-10-26 RX ADMIN — HYDROMORPHONE HYDROCHLORIDE 2 MG: 2 TABLET ORAL at 15:07

## 2018-10-26 RX ADMIN — APIXABAN 5 MG: 5 TABLET, FILM COATED ORAL at 18:39

## 2018-10-26 RX ADMIN — CLOPIDOGREL BISULFATE 75 MG: 75 TABLET ORAL at 08:50

## 2018-10-26 RX ADMIN — Medication 10 ML: at 23:40

## 2018-10-26 RX ADMIN — APIXABAN 5 MG: 5 TABLET, FILM COATED ORAL at 06:12

## 2018-10-26 RX ADMIN — MIRABEGRON 50 MG: 25 TABLET, FILM COATED, EXTENDED RELEASE ORAL at 23:50

## 2018-10-26 NOTE — ROUTINE PROCESS
3996 - Bedside report received from [facility]. Patient in bed at this time. Plan of care for the day addressed with the patient. Care assumed at this time.

## 2018-10-26 NOTE — PROGRESS NOTES
Problem: Falls - Risk of 
Goal: *Absence of Falls Document Urvashi White Fall Risk and appropriate interventions in the flowsheet. Outcome: Progressing Towards Goal 
Fall Risk Interventions: 
Mobility Interventions: Patient to call before getting OOB Mentation Interventions: Adequate sleep, hydration, pain control Medication Interventions: Patient to call before getting OOB, Teach patient to arise slowly Elimination Interventions: Call light in reach, Patient to call for help with toileting needs

## 2018-10-26 NOTE — PROGRESS NOTES
Vascular & Interventional Radiology Brief Procedure Note Interventional Radiologist: Luna Neumann MD 
 
Assistants: None Pre-operative Diagnosis:  Infected knee arthroplasty bilateral 
 
Post-operative Diagnosis: Same as pre-op dx Procedure(s) Performed:  Ultrasound and fluoro guided PICC insertion Anesthesia:  Local  
 
Findings:  Right basilic vein access Complications: None Estimated Blood Loss:  minimal 
 
Tubes and Drains: 5 FR double lumen PICC Specimens: None Condition: Good Disposition:  Home Plan:  PICC ready to use Luna Neumann MD 
[facility] Vascular & Interventional Radiology 10/26/2018

## 2018-10-26 NOTE — PROGRESS NOTES
Hospitalist Progress Note Patient: Kavitha Jones MRN: [id number]  CSN: [id number] YOB: 1935  Age: [de-identified] y.o. Sex: male DOA: 10/24/2018 LOS:  LOS: 2 days Assessment/Plan Patient Active Problem List  
Diagnosis Code  Prostate cancer ([facility] C61  Coronary artery disease involving native coronary artery I25.10  Stented coronary artery Z95.5  Acute combined systolic and diastolic congestive heart failure (HCC) I50.41  
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  
 Hematuria R31.9  Hypercholesteremia E78.00  Hypertension I10  
 Impotence of organic origin N52.9  NSTEMI (non-ST elevated myocardial infarction) ([facility] I21.4  Pseudoaneurysm following procedure ([facility] I97.89, I72.9  Urgency of urination R39.15  
 Urinary frequency R35.0  Joint prosthesis infection or inflammation, subsequent encounter T84.50XD [de-identified] yo male admitted for knee infection. Consult for dizziness, diaphoresis. Patient reports that dizziness is chronic. He takes meclizine at home. CAD - with elevated troponin, echo ordered, cardiology consulted. EKG reviewed. PAF - continue on eliquis. PJI - ID consulted. Ok to d/c from medical standpoint once cleared from cardiology. Review of systems General: No fevers or chills. Cardiovascular: No chest pain or pressure. No palpitations. Pulmonary: No shortness of breath. Gastrointestinal: No nausea, vomiting. Physical Exam: 
General: Awake, cooperative, no acute distress   
HEENT: NC, Atraumatic. PERRLA, anicteric sclerae. Lungs: CTA Bilaterally. No Wheezing/Rhonchi/Rales. Heart:  Regular  rhythm,  No murmur, No Rubs, No Gallops Abdomen: Soft, Non distended, Non tender.  +Bowel sounds, Extremities: Bilateral knee bandaged. Psych:   Not anxious or agitated. Neurologic:  No acute neurological deficit. Vital signs/Intake and Output: Visit Vitals /50 (BP 1 Location: Right arm, BP Patient Position: At rest) Pulse (!) 53 Temp 97.9 °F (36.6 °C) Resp 16 Ht 5' 10\" (1.778 m) Wt 84.8 kg (187 lb) SpO2 99% BMI 26.83 kg/m² Current Shift:  10/26 0701 - 10/26 1900 In: 240 [P.O.:240] Out: 1150 [MUPSD:6553] Last three shifts:  10/24 1901 - 10/26 0700 In: 7692 [P.O.:1120; I.V.:2800] Out: 2850 [Urine:2850] Labs: Results:  
   
Chemistry Recent Labs 10/26/18 
0318 10/25/18 
0252 10/24/18 
1750 * 129* 102*  137 138  
K 4.4 4.7 4.6 CL 99* 101 104 CO2 26 23 25 BUN 10 15 17 CREA 0.78 0.86 0.97  
CA 11.8* 10.0 8.5 AGAP 13 13 9 BUCR 13 17 18 CBC w/Diff Recent Labs 10/26/18 
9842 10/25/18 
6835 WBC 8.2 11.8  
RBC 3.44* 3.53* HGB 9.4* 9.6* HCT 29.5* 30.1*  
 347 Cardiac Enzymes Recent Labs 10/26/18 
0442 10/25/18 
1940 CPK 69 94 CKND1 3.3 3.2 Coagulation No results for input(s): PTP, INR, APTT in the last 72 hours. No lab exists for component: INREXT Lipid Panel No results found for: CHOL, CHOLPOCT, CHOLX, CHLST, CHOLV, 758199, HDL, LDL, LDLC, DLDLP, 272417, VLDLC, VLDL, TGLX, TRIGL, TRIGP, TGLPOCT, CHHD, CHHDX  
BNP No results for input(s): BNPP in the last 72 hours. Liver Enzymes No results for input(s): TP, ALB, TBIL, AP, SGOT, GPT in the last 72 hours. No lab exists for component: DBIL Thyroid Studies No results found for: T4, T3U, TSH, TSHEXT Procedures/imaging: see electronic medical records for all procedures/Xrays and details which were not copied into this note but were reviewed prior to creation of Plan

## 2018-10-26 NOTE — PROGRESS NOTES
Problem: Mobility Impaired (Adult and Pediatric) Goal: *Acute Goals and Plan of Care (Insert Text) Goals to be addressed in 1-4 days: STG 
1. Rolling L to R to L modified independent for positioning. 2. Supine to sit to supine S with HR for meals. 3. Sit to stand to sit S with RW in prep for ambulation. LTG 1. Ambulate >150ft S with RW, WBAT, for home/community mobility. 2. Ascend/descend a >3 stair steps CGA with HR for home entry. 3. Tolerate up in chair 1-2 hours for ADLs. 4. Patient/family to be independent with HEP for follow-up care and safe discharge. physical Therapy TREATMENT Patient: Benjamin Muñoz (54 y.o. male) Date: 10/26/2018 Diagnosis: BILATERAL KNEE INFECTION S/P TKR Joint prosthesis infection or inflammation, subsequent encounter <principal problem not specified> Procedure(s) (LRB): 
BILATERAL REVISION TOTAL KNEE ARTHROPLASTY WITH POLY SWAP, OPEN INCISION AND DEBRIDEMENT (Bilateral) 2 Days Post-Op Precautions: Fall, WBAT Chart, physical therapy assessment, plan of care and goals were reviewed. ASSESSMENT: 
Pt meets mobility needs for home D/c and is exceeding goals set in evaluation. D/c has not been announced, but Pt can be D/c'd from PT standpoint. Will F/u to maintain ROM and mobility. Safe to mobilize with NSG or family Progression toward goals: 
[x]      Improving appropriately and progressing toward goals 
[]      Improving slowly and progressing toward goals 
[]      Not making progress toward goals and plan of care will be adjusted PLAN: PT frequency is now 1-2x daily Patient continues to benefit from skilled intervention to address the above impairments. Continue treatment per established plan of care. Discharge Recommendations:  Home Health Further Equipment Recommendations for Discharge:  bedside commode and rolling walker SUBJECTIVE:  
Patient stated I need something stronger than Percocet.  OBJECTIVE DATA SUMMARY:  
Critical Behavior: Neurologic State: Appropriate for age, Alert Safety/Judgement: Awareness of environment, Fall prevention Functional Mobility Training: 
Bed Mobility: 
Supine to Sit: Modified independent Sit to Supine: Modified independent Transfers: 
Sit to Stand: Modified independent Stand to Sit: Modified independent Balance: 
Sitting: Intact Standing: Intact; With supportAmbulation/Gait Training: 
Distance (ft): 250 Feet (ft) Assistive Device: Gait belt;Walker, rolling Ambulation - Level of Assistance: Stand-by assistance Gait Abnormalities: Antalgic;Decreased step clearance Right Side Weight Bearing: As tolerated Left Side Weight Bearing: As tolerated Base of Support: Widened Stance: Weight shift Speed/Shelbi: Slow Stairs: 
Number of Stairs Trained: 4 Stairs - Level of Assistance: Supervision Rail Use: BothTherapeutic Exercises:  
 
 
EXERCISE Sets Reps Active Active Assist  
Passive Self ROM Comments Ankle Pumps 1 30  [] [] [] [] Quad Sets/Glut Sets 1 10 [] [] [] [] Hamstring Sets 1 10 [] [] [] [] Short Arc Quads 1 10 [] [] [] [] Heel Slides 1 10 [] [] [] [] Straight Leg Raises 1 10 [] [] [] [] Hip Abd/Add   [] [] [] [] Long Arc Quads 1 10 [] [] [] [] Seated Marching   [] [] [] []   
Standing Marching   [] [] [] []   
   [] [] [] [] AAROM: 
R= 1-96 L= 3-92Pain: 
Pain Scale 1: Numeric (0 - 10) Pain Intensity 1: 8 Pain out: 9 Pain Location 1: Knee Pain Orientation 1: Left;Right Pain Description 1: Aching Pain Intervention(s) 1: Medication (see MAR) Activity Tolerance:  
Good Please refer to the flowsheet for vital signs taken during this treatment. After treatment:  
[x] Patient left in no apparent distress sitting EOB [] Patient left in no apparent distress in bed 
[x] Call bell left within reach [x] Nursing notified 
[] Caregiver present 
[] Bed alarm activated Shawn Quezada PTA Time Calculation: 46 mins

## 2018-10-26 NOTE — PROGRESS NOTES
1030 am Transition of care: anticipate d/c home when medically cleared Met wit patient at bedside. He informed cm that he has a cost of his medications from [facility]. She informed cm for Ancef 2gm Q8 hours will be $58.22day culin pump is $16.28 day then for vanc 1250mg Q12 hurs is HP $60.95/day pluu culin is $30. 45 day. She provided this information to patient and he is willing to pay. Cm did offer rehab but he does not want to go to rehab. Lilly from [facility] met with cm she is willing to provide services but she will need copy of PICC report and orders for the antibiotic. Care Management Interventions PCP Verified by CM: Yes Transition of Care Consult (CM Consult): Home Health [facility] - INPATIENT: No 
Reason Outside [place]: (wants to use [facility] ([place], [place] Physical Therapy Consult: Yes Occupational Therapy Consult: Yes Current Support Network: Lives with Spouse Confirm Follow Up Transport: Family Plan discussed with Pt/Family/Caregiver: Yes Freedom of Choice Offered: Yes Discharge Location Discharge Placement: Home with [facility] Transition of care: Home once medically cleared by cardiology and has prescriptions faxed to [facility] for IV antibiotics and has picc and picc report faxed to [facility] Met with patient and Dr. Agapito Simmonds at bedside. Dr. Agapito Simmonds informed patient as far as medically he is cleared and patient is to follow up with Dr. Claudia Pineda I/D. Dr. Agapito Simmonds informed cm that patient would need to be cleared by cardiology. Laura informed cm that Dr. Jose F Bright has ordered labs for am fasting. Lilly from [facility] came by and visited with patient and broke down prices of atb and infusion. Patient is willing to pay cost he does not want to go to rehab. Lilly from [facility] would like picc report and rx faxed once we have them. CHRISTOS Guzman informed cm that Dr. Laura Lowry informed her that patient is not to be d/c until he is cleared by cardiologist, medicine and I/d. WILMAN has called Dr. Susan Mckeon and was informed that Zulmastanislaw Castellano [place] to call her. Cm did call Zulma Castellano, [address] informed her that Dorothea Soulier is not on call for [facility] and No I/D coverage. She states she will call him on his cell phone and get clarification for prescriptions for IV antibiotics. Once cm does have rx will need to fax to [place] as requested. Patient is off floor at this time getting picc. 1430 telephone call back from Zulma Castellano she will write rx for antibiotics but needs a pharmacy to fax the number antibiotic rx to. Telephone call with Kory Edmonds at [phone] she informed cm the pharmacy phone number for [facility] is [phone]. Telephone call back to [place], [place] she will fax the rx as requested. However, myah is aware patient has not been cleared by cardiology and cannot be d/c hoe until he is cleared. Dr. Manny Perrin is to see. 1436 cm met with patient and family as to all of te above. Jaya Morel also aware of all of above please read orders below from Dr. Asim Muniz will assume ID duty on Monday morning if he is still here. If he goes home over weekend, have [address] draw weekly labs for me please qMondays (CBC, CMP, CRP, and vanco level. .if vanco is used) facsimiled to Stevan Murray at [phone]. Dr Monique Childress can see him in [address] [facility] next Wednesday morning to make sure he got home okay and to double-check MICRO/labs before I will be back in clinic on [id number]. This was placed in [facility] orders 
  
 
 
Care Management Interventions PCP Verified by CM: Yes Transition of Care Consult (CM Consult): Home Health [address]: No 
Reason Outside [place]: Patient already serviced [facility] Discharge Durable Medical Equipment: No 
Physical Therapy Consult: Yes Occupational Therapy Consult: Yes Current Support Network: Lives with Spouse, Family Lives [place], Own Home Confirm Follow Up Transport: Family Plan discussed with Pt/Family/Caregiver: Yes Freedom of Choice Offered: Yes Discharge Location Discharge Placement: Home with home health

## 2018-10-26 NOTE — PROGRESS NOTES
1930: Assumed care from Brittny Vital RN. Patient is resting in bed, alert and oriented. No needs expressed at this time. 2100: Shift assessment completed and documented. 2300: Patient is up with assistance to the bathroom. 0630: Shift summary. Patient had multiple episode of pain which was treated with PRN Percocet. Patient was NSR on the monitor with BBB and first degree AVB. 0730: Bedside and Verbal shift change report given to Josue Beltran RN (oncoming nurse) by Baldev Lee RN (offgoing nurse). Report included the following information SBAR, Kardex and MAR.

## 2018-10-26 NOTE — ROUTINE PROCESS
1245: Paged Dr. Olivia Franklin about providing antibiotic prescriptions for pt.  stated that pt could be discharged after infectious disease has cleared him and they are responsible for writing prescription also discharged after medicine and PICC placed for pt. Infectious disease isn't available today, and pt is getting PICC is being placed today. Will contact [facility] and provide information and clarify. Bedside and Verbal shift change report given to Dion Smith RN (oncoming nurse) by Aldair Cadet RN (offgoing nurse). Report included the following information SBAR, Kardex, Procedure Summary, Intake/Output, MAR, Accordion, Recent Results and Med Rec Status.

## 2018-10-26 NOTE — PROGRESS NOTES
1048 - Patient in bed at this time. A/O x 3. IV to left wrist  intact and patent. Elastic bandages to both legs CDI. Plexis to both feet. Denies numbness/tingling. Pedal pulses palpable. Lungs clear. Bowel sounds present to all quadrants. Patient able to get to 2500 on the incentive spirometer. Pain 5/10. MD Bjorn Colin said BP medication due at 9 am could be held. 0936-Pain 5/10. PRN Roxicodone 5mg pain medication administered at this time. Patient has been educated on side effects. 1357-Pain 5/10. PRN Roxicodone pain medication administered at this time. Patient has been educated on side effects.

## 2018-10-26 NOTE — DISCHARGE SUMMARY
Patient: Cristina Berumen               Sex: male         MRN: [id number]       YOB: 1935      Age:  [de-identified] y.o.        LOS:  LOS: 2 days                DOA: 10/24/2018    Discharge Date: 10/26/2018    Admission Diagnoses: BILATERAL KNEE INFECTION S/P TKR  Joint prosthesis infection or inflammation, subsequent encounter    Discharge Diagnoses:    Problem List as of 10/26/2018 Date Reviewed: 1/11/2018          Codes Class Noted - Resolved    Joint prosthesis infection or inflammation, subsequent encounter ICD-10-CM: T84.50XD  ICD-9-CM: V58.89, 996.66  10/24/2018 - Present        DI (detrusor instability) ICD-10-CM: N32.81  ICD-9-CM: 596.59  3/13/2018 - Present        Pseudoaneurysm following procedure ([facility] ICD-10-CM: I97.89, I72.9  ICD-9-CM: 997.79, 442.9  2/12/2018 - Present        Acute combined systolic and diastolic congestive heart failure ([facility] ICD-10-CM: I50.41  ICD-9-CM: 428.41, 428.0  1/16/2018 - Present    Overview Signed 3/13/2018 11:41 AM by Artelia Councilman     Overview:   POA due Ischemic Cardiomyopathy             Hypercholesteremia ICD-10-CM: E78.00  ICD-9-CM: 272.0  1/12/2018 - Present        Hypertension ICD-10-CM: I10  ICD-9-CM: 401.9  1/12/2018 - Present        NSTEMI (non-ST elevated myocardial infarction) [facility]) ICD-10-CM: I21.4  ICD-9-CM: 410.70  1/12/2018 - Present        Stented coronary artery ICD-10-CM: Z95.5  ICD-9-CM: V45.82  1/10/2018 - Present        Coronary artery disease involving native coronary artery ICD-10-CM: I25.10  ICD-9-CM: 414.01  12/13/2017 - Present        Prostate cancer [facility]) ICD-10-CM: C61  ICD-9-CM: 185  7/18/2012 - Present    Overview Addendum 3/14/2018  5:55 PM by Daniella Perez MD     Overview:   Mohsen 3+3.   Overview:   PNBX 4/18/08 G 3+3 20% LA  PSA 2.4,   CRYO 9/18/08  PSAs,,,12/08 = 0.1,,,2/09 = 0.03,,,3/09 = 0.3,,,9/09 = 0.8,,,12/09 = 0.6  7/5/11 Progress note from Janna Nelson is a 70 y.o. male referred by Gagandeep Diggs MD   for CP FU Hematuria [599.70F]  12/29/2009 12/29/09 < 1 HPF, NEW DX   PLAN CHECK NMP 22 = neg AND UA IN 3 MONTHS   4/1/10 < 1 HPF   CRYO / CYSTO 9/08 , CT 2/08, UA NEG FOR HEME PRIOR TO 12/09   PLAN CYTOLOGY, NEGATIVE, RTO IN 3 MONTHS FOR FU, 7/2  [facility]      Prostate Ca [185U]        PNBX 4/18/08 G 3+3 20% LA PSA 2.4   9/18/08 CRYO   PSAs,,,12/08 = 0.1,,,2/09 = 0.03,,,3/09 = 0.3,,,9/09 = 0.8,,,12/09 = 0.6   3/10 = 0.5   PLAN RTO 6 MONTHS,PSA PRIOR,,,7/2/10  [facility]    5/12/10   VINCENT ,,,PLEASE TELL PT CYTOLOGY FOR 3/10 = OK RTO 7/2/10 AND PSA 2 WKS PRIOR   THANKS   7/2/2010 Patient s/p cryo 9/08 for ACP PSA down to 0.5 , Has some mild dampness in shorts and some urgency but improving . Pt on Detrol LA in am , Vesicare 5mg at night ., Hytrin 5mg Patient denies:dysuria, hematuria, , urinary frequency and urinary retention, Notes Nocturia X none   ASSESSMENT/ PLAN:   S/p cryo , ANDREY   On Vesicare & Detrol per   Page   7/5/2011 Patient doing well , s/p cryo Mild urgency on vesicare & detrol la , Patient denies:dysuria, hematuria, incontinence and slow stream, Notes Nocturia X 1   ASSESSMENT/ PLAN:   ->s/p cryo doing well     psa 7/12   0.625   PSA 7/13 0.9   PSA 8/14  0.71   psa o.57  2/15  PSA 0.67  2/16   PSA 0.51  2/17    PSA 0.99 3/18              Hematuria ICD-10-CM: R31.9  ICD-9-CM: 599.70  12/29/2009 - Present    Overview Signed 3/13/2018 11:41 AM by Nikia Munroe     Overview:   12/29/09  < 1 HPF, NEW DX  PLAN  CHECK NMP 22 AND UA IN 3 MONTHS             CAP (community acquired pneumonia) ICD-10-CM: J18.9  ICD-9-CM: 486  3/20/2009 - Present        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 male with a  history of bilateral knee following total knee arthroplasty about 10 years ago. Synovial fluid was tested and patient was diagnosed with prosthetic joint infections. The option of an open I&D with poly swap was discussed with the patient. Risks and benefits of the procedure were explained to the patient as well as other treatment options and possible surgical outcomes. The patient acknowledged and consent was obtained. The patient was therefore scheduled to undergo a bilateral open I&D with polyswap with Dr. Abbi Almonte. The patient was taken to the operating room for the above-stated procedure. IV antibiotics were held until cultures were obtained. SCDs were used for DVT prophylaxis. The patient had an estimated intraoperative blood loss of 100 mL. The patient tolerated the procedure well without any complications, and was taken to the recovery room in stable condition. The patient was then transferred to the postoperative orthopedic floor for convalescence, PT, pain management, as well as discharge planning. A consultations was made to the hospitalist whom continued to monitor the patient throughout the patient's hospitalizations. The Infectious Disease team was consulted for antibiotic management. Physical therapy and occupational therapy initiated their evaluation and treatment and continued to follow the patient until the patient was discharged. For pain control, a femoral nerve block was used, and was discontinued on post-operative day 2. The patient then was transitioned over to oral narcotics in which was well tolerated. DVT prophylaxis was initiated on the day of surgery including;  Plavix and eliquis, compression stockings and bilateral foot pumps. At the time of discharge, the patient was able to ambulate safely, go up and down stairs and had an understanding of the explicit discharge precautions and instructions following surgery. Home Health will come out to assist the pateint with this,.  The patient was discharged to follow up with Dr. Abbi Almonte in approximately 10 to 14 days. Discharge Condition: Stable  DISPOSITION: Home. On the day of discharge the patient was afebrile. Vital signs were stable. The patient was in no acute distress. his Bilateral knee incision was clean, dry, and intact. Extremity was warm and well-perfused, distally neurovascularly intact. DISCHARGE INSTRUCTIONS:  The patient will be discharged home on Plavix and Eliquis. Continue physical therapy for range of motion, gait training and strengthening. Continue therapeutic exercises. Follow up in 10 to 14 days with Dr. Lynnette Verdugo. DISCHARGE MEDICATIONS:      Medication List      START taking these medications    HYDROmorphone 2 mg tablet  Commonly known as:  DILAUDID  Take 1 Tab by mouth every four (4) hours as needed. Max Daily Amount: 12 mg. CONTINUE taking these medications    ELIQUIS 5 mg tablet  Generic drug:  apixaban     LIPITOR 40 mg tablet  Generic drug:  atorvastatin     losartan 25 mg tablet  Commonly known as:  COZAAR     mirabegron ER 25 mg ER tablet  Commonly known as:  MYRBETRIQ  Take 2 Tabs by mouth daily for 90 days.      omeprazole 20 mg capsule  Commonly known as:  PRILOSEC     PLAVIX 75 mg Tab  Generic drug:  clopidogrel           Where to Get Your Medications      Information about where to get these medications is not yet available    Ask your nurse or doctor about these medications  · HYDROmorphone 2 mg tablet

## 2018-10-26 NOTE — PROGRESS NOTES
Progress Note Patient: Ele Vickers MRN: [id number]  SSN: [id number] YOB: 1935  Age: [de-identified] y.o. Sex: male 2 Day Post-Op status post Procedure(s) (LRB): 
BILATERAL REVISION TOTAL KNEE ARTHROPLASTY WITH POLY SWAP, OPEN INCISION AND DEBRIDEMENT (Bilateral) Admit Date: 10/24/2018 Admit Diagnosis: BILATERAL KNEE INFECTION S/P TKR Joint prosthesis infection or inflammation, subsequent encounter Subjective:   
 
Continues to complain of vertigo. He states Percocet is not controlling his pain. No SOB. No Chest Pain. No Nausea or Vomiting. No problems eating or voiding. Objective:  
  
 
Temp (24hrs), Av.9 °F (36.6 °C), Min:97.5 °F (36.4 °C), Max:98.3 °F (36.8 °C) Body mass index is 26.83 kg/m². Patient Vitals for the past 12 hrs: 
 BP Temp Pulse Resp SpO2 Weight 10/26/18 1152 115/56 97.6 °F (36.4 °C) 68 16 98 %   
10/26/18 0802 113/58 97.8 °F (36.6 °C) 68 16 96 %   
10/26/18 0515      84.8 kg (187 lb) 10/26/18 0320 114/58 97.7 °F (36.5 °C) 67 16 97 %  Recent Labs 10/26/18 
8892 HGB 9.4* HCT 29.5*   
K 4.4 CL 99* CO2 26 BUN 10  
CREA 0.78 * Physical Exam: 
Vital Signs are Stable. No Acute Distress. Alert and Oriented. Negative Homans sign. Toes AROM Full. Neurovascular exam is normal.   
Dressing is Clean, Dry, and Intact. Assessment/Plan: 1. Continue oob/rehab 2. Apply compression stockings 3. Continue PT/OT 4. Continue ROM 5. Infectious disease for antibiotic management. 6. Hospitalist following 7. Patient is still awaiting PICC line. 8. Discharge planning 9. Plavix and Eliquis for DVT prophylaxis. Discharge Plan: Home with Home Health Signed By: Rolando Knapp PA-C 2018

## 2018-10-26 NOTE — ROUTINE PROCESS
Katia Louie TRANSFER - OUT REPORT: 
 
Verbal report given to ericc B rn(name) on Sharon Pennington  being transferred to Telemetry(unit) for transfer of care Report consisted of patients Situation, Background, Assessment and  
Recommendations(SBAR). Information from the following report(s) SBAR, Kardex, OR Summary, Procedure Summary and MAR was reviewed with the receiving nurse. Lines:  
Peripheral IV 10/25/18 Left Antecubital (Active) Site Assessment Clean, dry, & intact 10/25/2018  4:46 PM  
Phlebitis Assessment 0 10/25/2018  4:46 PM  
Infiltration Assessment 0 10/25/2018  4:46 PM  
Dressing Status Clean, dry, & intact 10/25/2018  4:46 PM  
Dressing Type Transparent;Tape 10/25/2018  4:46 PM  
Hub Color/Line Status Capped 10/25/2018  4:46 PM  
Action Taken Open ports on tubing capped 10/25/2018  4:46 PM  
Alcohol Cap Used Yes 10/25/2018  4:46 PM  
  
 
Opportunity for questions and clarification was provided. Patient transported with: 
 Patient-specific medications from Pharmacy

## 2018-10-26 NOTE — ROUTINE PROCESS
Shift summary: 
1300: Pt transported to IR for insertion of PICC line. 1400: Pt back on unit from IR.

## 2018-10-26 NOTE — DISCHARGE INSTRUCTIONS
Discharge Instructions   Fred Cruz M.D. Please take the time to review the following instructions before you leave the hospital and use them as guidelines during your recovery from surgery. If you have any questions you may contact my office at [phone]. Wound Care / Dressing Changes: You may change your dressing as needed. Beginning the day after you are discharged from the hospital you should change your dressing daily. A big, bulky dressing isn't necessary as long as there isn't any drainage from the incisions. You can put a band-aid or mepilex dressing over the incision. It isn't necessary to apply antibiotic ointment to your incisions. There will be a clear film covering your incision, do not remove. As the edges begin to peel, you may trim with small scissors. This film will fall of in about 1 month. Linda Sears / Bathing: You may only shower. You may shower if there is no drainage from your incisions. Your dressing may be removed for showering. You may get your incisions wet in the shower. Don't vigorously scrub the area where your incisions are. Apply a clean, dry dressing after drying off the area of your incisions. Don't take a tub bath, get in a swimming pool or Jacuzzi until the incisions are completely healed. Do not soak your incisions under water. Weight Bearing Status / Braces:     __X___ Weight bearing as tolerated. Use crutches, walker, or cane as needed for support. You may move your joints as much as tolerated.     _____  Norris Jamil Touch\" weight bearing. Don't bear weight on the leg you had operated on. Use your toes only to steady yourself as you ambulate with crutches or a walker. _____ Avoid extreme hip extension with external rotation. Home Health:    Home health has been arranged for skilled nursing visits and physical therapy. Your home health has been set up through____________ .  If no one from the agency calls you on the day after you arrive home, please contact them at the number provided at discharge. Physical therapy for gait training and strengthening. Continue therapeutic exercises. Physical Therapy:       Begin In-Home Physical Therapy 3 times a week to work on gait training, range of motion, strengthening, and weight bearing exercises as tolerable. Continue to use your walker or cane when walking. You may progress from the walker to a cane to full weight bearing as tolerable. Ice / Elevation:     Continue ice and elevation as needed for pain and swelling. Diet:     Resume your pre hospital diet. If you have excessive nausea or vomiting call your doctor's office. Medication:     1. You will be given a prescription for pain medication when you are discharged for the hospital. Take the medication as needed according to the directions on the prescription bottle. Possible side effects of the medication include dizziness, headache, nausea, vomiting, constipation and urinary retention. If you experience any of these side effects call the office so that we can assist you in relieving them. Discontinue the use of the pain medication if you develop itching, rash, shortness of breath or difficulties swallowing. If these symptoms become severe or aren't relieved by discontinuing the medication you should seek immediate medical attention. Refills of pain medication are authorized during office hours only. (8am - 5pm Mon. thru Fri.)     1. You should take Plavix and Eliquis as directed by your physician. This will help to prevent blood clots from forming in your legs. 2. You may resume the medication you were taking prior to your surgery. Pain medication may change the effects of any antidepressant medication you are taking. If you have any questions about possible interactions between your regular medications and the pain medication you should consult the physician who prescribes your regular medications.    3.   Please take over the counter stool softeners while you are taking narcotic pain                  medication. Pain medications can cause constipation. Stool softeners, warm         prune juice and increasing your water and fiber intake can help prevent constipation. Do not take laxatives. Follow Up Appointment:   Please call [phone] for a follow appointment with Dr. Rajani Pineda in 10-14 days from the time of your surgery. Please let our office know you are scheduling a post-op appointment. Signs and Symptoms to be Aware of: If any of the following signs and symptoms occur, you should contact Dr. Eliseo Haney office. Please be advised if a problem arises which you feel requires immediate medical attention or you are unable to contact Dr. Eliseo Haney office you should seek immediate medical attention at the emergency department or other health care facility you have access to. Signs and symptoms to watch for include:     1. A sudden increase in swelling and or redness or warmth at the area your surgery was performed which isn't relieved by rest, ice and elevation. 2 Oral temperature greater than 101.5 degrees for 12 hours or more which isn't relieved by an increase in fluid intake and taking two Tylenol every 4-6 hours. 3 Excessive drainage from your incisions, or drainage which hasn't stopped by 72 hours after your surgery despite applying a compressive dressing, ice and elevation. 4 Calfpain, tenderness, redness or swelling which isn't relieved with rest and elevation. 5 Fever, chills, shortness ofbreath, chest pain, nausea, vomiting or other signs and symptoms which are of concern to you.      Other Instructions:

## 2018-10-27 VITALS
WEIGHT: 190 LBS | HEART RATE: 48 BPM | RESPIRATION RATE: 16 BRPM | OXYGEN SATURATION: 98 % | HEIGHT: 70 IN | TEMPERATURE: 97.7 F | BODY MASS INDEX: 27.2 KG/M2 | DIASTOLIC BLOOD PRESSURE: 56 MMHG | SYSTOLIC BLOOD PRESSURE: 101 MMHG

## 2018-10-27 LAB
ANION GAP SERPL CALC-SCNC: 7 MMOL/L (ref 3–18)
BUN SERPL-MCNC: 11 MG/DL (ref 7–18)
BUN/CREAT SERPL: 17
CALCIUM SERPL-MCNC: 11.8 MG/DL (ref 8.5–10.1)
CHLORIDE SERPL-SCNC: 99 MMOL/L (ref 100–108)
CO2 SERPL-SCNC: 29 MMOL/L (ref 21–32)
CREAT SERPL-MCNC: 0.65 MG/DL (ref 0.6–1.3)
DATE LAST DOSE: ABNORMAL
ERYTHROCYTE [DISTWIDTH] IN BLOOD BY AUTOMATED COUNT: 15.7 % (ref 11.6–14.5)
GLUCOSE SERPL-MCNC: 91 MG/DL (ref 74–99)
HCT VFR BLD AUTO: 28.8 % (ref 36–48)
HGB BLD-MCNC: 9.2 G/DL (ref 13–16)
MCH RBC QN AUTO: 27.2 PG (ref 24–34)
MCHC RBC AUTO-ENTMCNC: 31.9 G/DL (ref 31–37)
MCV RBC AUTO: 85.2 FL (ref 74–97)
PLATELET # BLD AUTO: 367 K/UL (ref 135–420)
PMV BLD AUTO: 10.6 FL (ref 9.2–11.8)
POTASSIUM SERPL-SCNC: 4.7 MMOL/L (ref 3.5–5.5)
RBC # BLD AUTO: 3.38 M/UL (ref 4.7–5.5)
REPORTED DOSE,DOSE: ABNORMAL UNITS
REPORTED DOSE/TIME,TMG: 1700
SODIUM SERPL-SCNC: 135 MMOL/L (ref 136–145)
VANCOMYCIN TROUGH SERPL-MCNC: 22.4 UG/ML (ref 10–20)
WBC # BLD AUTO: 8.9 K/UL (ref 4.6–13.2)

## 2018-10-27 PROCEDURE — 74011250637 HC RX REV CODE- 250/637: Performed by: PHYSICIAN ASSISTANT

## 2018-10-27 PROCEDURE — 97116 GAIT TRAINING THERAPY: CPT

## 2018-10-27 PROCEDURE — 90686 IIV4 VACC NO PRSV 0.5 ML IM: CPT | Performed by: ORTHOPAEDIC SURGERY

## 2018-10-27 PROCEDURE — 74011250637 HC RX REV CODE- 250/637: Performed by: INTERNAL MEDICINE

## 2018-10-27 PROCEDURE — 90471 IMMUNIZATION ADMIN: CPT

## 2018-10-27 PROCEDURE — 85027 COMPLETE CBC AUTOMATED: CPT | Performed by: PHYSICIAN ASSISTANT

## 2018-10-27 PROCEDURE — 80048 BASIC METABOLIC PNL TOTAL CA: CPT | Performed by: PHYSICIAN ASSISTANT

## 2018-10-27 PROCEDURE — 80202 ASSAY OF VANCOMYCIN: CPT | Performed by: PHYSICIAN ASSISTANT

## 2018-10-27 PROCEDURE — 74011250636 HC RX REV CODE- 250/636: Performed by: PHYSICIAN ASSISTANT

## 2018-10-27 PROCEDURE — 36415 COLL VENOUS BLD VENIPUNCTURE: CPT | Performed by: PHYSICIAN ASSISTANT

## 2018-10-27 PROCEDURE — 97110 THERAPEUTIC EXERCISES: CPT

## 2018-10-27 PROCEDURE — 74011250636 HC RX REV CODE- 250/636: Performed by: ORTHOPAEDIC SURGERY

## 2018-10-27 RX ORDER — METOPROLOL SUCCINATE 25 MG/1
25 TABLET, EXTENDED RELEASE ORAL DAILY
Status: DISCONTINUED | OUTPATIENT
Start: 2018-10-27 | End: 2018-10-27 | Stop reason: HOSPADM

## 2018-10-27 RX ORDER — ACETAMINOPHEN 500 MG
500 TABLET ORAL
Status: DISCONTINUED | OUTPATIENT
Start: 2018-10-27 | End: 2018-10-27 | Stop reason: HOSPADM

## 2018-10-27 RX ADMIN — Medication 2 G: at 05:00

## 2018-10-27 RX ADMIN — CLOPIDOGREL BISULFATE 75 MG: 75 TABLET ORAL at 08:25

## 2018-10-27 RX ADMIN — HYDROMORPHONE HYDROCHLORIDE 2 MG: 2 TABLET ORAL at 12:45

## 2018-10-27 RX ADMIN — APIXABAN 5 MG: 5 TABLET, FILM COATED ORAL at 07:41

## 2018-10-27 RX ADMIN — Medication 2 G: at 13:46

## 2018-10-27 RX ADMIN — ACETAMINOPHEN 500 MG: 500 TABLET, FILM COATED ORAL at 13:45

## 2018-10-27 RX ADMIN — DOCUSATE SODIUM 100 MG: 100 CAPSULE, LIQUID FILLED ORAL at 08:24

## 2018-10-27 RX ADMIN — LOSARTAN POTASSIUM 25 MG: 25 TABLET ORAL at 08:25

## 2018-10-27 RX ADMIN — Medication 10 ML: at 07:42

## 2018-10-27 RX ADMIN — HYDROMORPHONE HYDROCHLORIDE 2 MG: 2 TABLET ORAL at 01:01

## 2018-10-27 RX ADMIN — METOPROLOL SUCCINATE 25 MG: 25 TABLET, EXTENDED RELEASE ORAL at 08:24

## 2018-10-27 RX ADMIN — VANCOMYCIN HYDROCHLORIDE 1250 MG: 10 INJECTION, POWDER, LYOPHILIZED, FOR SOLUTION INTRAVENOUS at 07:41

## 2018-10-27 RX ADMIN — OMEPRAZOLE 20 MG: 20 CAPSULE, DELAYED RELEASE ORAL at 08:24

## 2018-10-27 RX ADMIN — Medication 10 ML: at 13:46

## 2018-10-27 RX ADMIN — SODIUM CHLORIDE, SODIUM LACTATE, POTASSIUM CHLORIDE, AND CALCIUM CHLORIDE 125 ML/HR: 600; 310; 30; 20 INJECTION, SOLUTION INTRAVENOUS at 08:24

## 2018-10-27 RX ADMIN — INFLUENZA VIRUS VACCINE 0.5 ML: 15; 15; 15; 15 SUSPENSION INTRAMUSCULAR at 14:10

## 2018-10-27 RX ADMIN — VANCOMYCIN HYDROCHLORIDE 1250 MG: 10 INJECTION, POWDER, LYOPHILIZED, FOR SOLUTION INTRAVENOUS at 07:42

## 2018-10-27 RX ADMIN — HYDROMORPHONE HYDROCHLORIDE 2 MG: 2 TABLET ORAL at 07:52

## 2018-10-27 NOTE — PROGRESS NOTES
Call placed to [facility] to verify that pharmacy orders are in place for expected discharge of patient today; awaiting call back from on call nurse Alex Mcdonough.

## 2018-10-27 NOTE — PROGRESS NOTES
Verified with [facility] nurse that patient's abx is coordinated; she requested that he receive 1p dose of Ancef and they are prepared to administer next dose of Vancomycin; will fax verification orders to have labs drawn q Monday to [phone]. Will review this information with patient.

## 2018-10-27 NOTE — PROGRESS NOTES
Pharmacy Dosing Services: Vancomycin Consult for Vancomycin Dosing by Pharmacy by TRUMAN Garcia Consult provided for this [de-identified]y.o. year old male , for indication of Bone and Joint Infection. Day of Therapy 4 Vancomycin Trough= 22.4 mcg/ml at 0430 10/27/18 Goal Trough= 15-20 mcg/ml Continue with current dose of Vancomycin 1250 mg IV every 12 hours due to improvement in renal function Pharmacy to follow daily and will make changes to dose and/or frequency based on clinical status.

## 2018-10-27 NOTE — PROGRESS NOTES
1920: Assumed patient care from [address] [place] [address]. Patient is alert and oriented to person, place, time and situation. Respiratory status is stable on room air. Vital signs are stable. MEWS score is a one. Patient angelina any pain, discomfort, nausea vomiting dizziness or anxiety. White board and fall card is updated. Bed is locked and in lowest position. Call bell, water and personal belongings are within reach. Patient has no questions, comments or concerns after bedside shift report. 1933; Patient asked for and received 2 mg of po Dilaudid for bilateral knee pain which he rated at an 7/10, with five being an acceptable level. 2020: Rounded on the patient. He is watching television in bed with no signs of distress noted. Patient rated his pain at a five of five. His call bell, water and personal belongings are within reach. Patient's bed is locked and within the lowest position. His bed alarm is on for safety. Patient has no requests at this time. 2120:  Rounded on the patient. He is watching television in bed with no signs of distress noted. Patient's call bell, water and personal belongings are within reach. His bed is locked and within the lowest position. Patient's bed alarm is on for safety. He has no requests at this time. 2220:  Rounded on the patient. He is watching television in bed with no signs of distress noted. Patient's call bell, water and personal belongings are within reach. His bed is locked and within the lowest position. Patient's bed alarm is on for safety. He has no requests at this time. 2320:  Rounded on the patient. He appears to be sleeping in bed with no signs of distress noted. Patient's call bell, water and personal belongings are within reach. His bed is locked and within the lowest position. Patient's bed alarm is on for safety.  
 
0101: Patient asked for and received 2 mg of po Dilaudid for bilateral knee pain which he rated at an 7/10, with five being an acceptable level. 0120: Rounded on the patient. He appears to be sleeping in bed with no signs of distress noted. Patient's call bell, water and personal belongings are within reach. His bed is locked and within the lowest position. Patient's bed alarm is on for safety. 0320: Rounded on the patient. He appears to be sleeping in bed with no signs of distress noted. Patient's call bell, water and personal belongings are within reach. His bed is locked and within the lowest position. Patient's bed alarm is on for safety. 4924: Rounded on the patient. He appears to be sleeping in bed with no signs of distress noted. Patient's call bell, water and personal belongings are within reach. His bed is locked and within the lowest position. Patient's bed alarm is on for safety. 0700: Patient had an uneventful shift. Respiratory status, vital signs and MEWS score remained stable. Patient was resting quietly with no signs of distress noted. Bed locked and in lowest position. Call bell water and personal belongings were within reach. Patient had no questions, comments or concerns after bedside shift report.  Bedside report given to [facility].

## 2018-10-27 NOTE — PROGRESS NOTES
Problem: Mobility Impaired (Adult and Pediatric) Goal: *Acute Goals and Plan of Care (Insert Text) Goals to be addressed in 1-4 days: STG 
1. Rolling L to R to L modified independent for positioning. 2. Supine to sit to supine S with HR for meals. 3. Sit to stand to sit S with RW in prep for ambulation. LTG 1. Ambulate >150ft S with RW, WBAT, for home/community mobility. 2. Ascend/descend a >3 stair steps CGA with HR for home entry. 3. Tolerate up in chair 1-2 hours for ADLs. 4. Patient/family to be independent with HEP for follow-up care and safe discharge. Outcome: Progressing Towards Goal 
physical Therapy TREATMENT/DISCHARGE Patient: Susan Westbrook (29 y.o. male) Date: 10/27/2018 Diagnosis: BILATERAL KNEE INFECTION S/P TKR Joint prosthesis infection or inflammation, subsequent encounter Joint prosthesis infection or inflammation, subsequent encounter Procedure(s) (LRB): 
BILATERAL REVISION TOTAL KNEE ARTHROPLASTY WITH POLY SWAP, OPEN INCISION AND DEBRIDEMENT (Bilateral) 3 Days Post-Op Precautions: Fall, WBAT Chart, physical therapy assessment, plan of care and goals were reviewed. ASSESSMENT: 
Pt meets needs for safe home mobility, expresses understanding of HEP and is cleared from this level of PT. Stair goal addressed on previous session. Progression toward goals: 
[x]      Goals met 
[]      Improving appropriately and progressing toward goals 
[]      Improving slowly and progressing toward goals 
[]      Not making progress toward goals and plan of care will be adjusted PLAN: 
Patient will be discharged from physical therapy at this time. Rationale for discharge: 
[x] Goals Achieved 
[] [address] 
[] Patient not participating in therapy 
[] Other: 
Discharge Recommendations:  [address] [place] [address] Further Equipment Recommendations for Discharge:  rolling walker SUBJECTIVE:  
Patient stated Hard to get sleep in the place.  OBJECTIVE DATA SUMMARY:  
Critical Behavior: Neurologic State: Appropriate for age, Alert Safety/Judgement: Awareness of environment, Fall prevention Functional Mobility Training: 
Bed Mobility: 
Rolling: Modified independent Supine to Sit: Modified independent Sit to Supine: Modified independent Transfers: 
Sit to Stand: Modified independent Stand to Sit: Modified independent Balance: 
Sitting: Intact Standing: Intact; With supportAmbulation/Gait Training: 
Distance (ft): 350 Feet (ft) Assistive Device: Gait belt;Walker, rolling Ambulation - Level of Assistance: Stand-by assistance Gait Abnormalities: Antalgic;Decreased step clearance Right Side Weight Bearing: As tolerated Left Side Weight Bearing: As tolerated Base of Support: Widened Stance: Weight shift Speed/Shelbi: Slow Therapeutic Exercises:  
 
 
EXERCISE Sets Reps Active Active Assist  
Passive Self ROM Comments Ankle Pumps 1 30 [x] [] [] [] Quad Sets/Glut Sets 1 10 [x] [] [] [] Hamstring Sets 1 10 [x] [] [] [] Short Arc Quads 1 10 [x] [] [] [] Heel Slides 1 10 [x] [] [] [] Straight Leg Raises 1 10 [x] [] [] [] Hip Abd/Add   [] [] [] [] Long Arc Quads 1 10 [x] [] [] [] Seated Marching   [] [] [] []   
Standing Marching   [] [] [] []   
   [] [] [] []   
 
Pain: 
Pain Scale 1: Numeric (0 - 10) Pain Intensity 1: 7 Pain Location 1: Knee Pain Orientation 1: Left;Right Pain Description 1: Aching Pain Intervention(s) 1: Medication (see MAR) Activity Tolerance:  
Good Please refer to the flowsheet for vital signs taken during this treatment. After treatment:  
[] Patient left in no apparent distress sitting up in chair 
[x] Patient left in no apparent distress in bed 
[x] Call bell left within reach [x] Nursing notified 
[x] Caregiver present 
[] Bed alarm activated Reggie Ramírez PTA Time Calculation: 29 mins

## 2018-10-27 NOTE — PROGRESS NOTES
Progress Note Patient: Martine Vera MRN: [id number]  SSN: [id number] YOB: 1935  Age: [de-identified] y.o. Sex: male 3 Days Post-Op status post Procedure(s) (LRB): 
BILATERAL REVISION TOTAL KNEE ARTHROPLASTY WITH POLY SWAP, OPEN INCISION AND DEBRIDEMENT (Bilateral) Admit Date: 10/24/2018 Admit Diagnosis: BILATERAL KNEE INFECTION S/P TKR Joint prosthesis infection or inflammation, subsequent encounter Subjective:   
 
Doing well. No complaints. No SOB. No Chest Pain. No Nausea or Vomiting. No problems eating or voiding. Objective:  
  
 
Temp (24hrs), Av °F (36.7 °C), Min:97.6 °F (36.4 °C), Max:98.6 °F (37 °C) Body mass index is 27.26 kg/m². Patient Vitals for the past 12 hrs: 
 BP Temp Pulse Resp SpO2 Weight 10/27/18 0741 129/64 98 °F (36.7 °C) 66 16 96 %   
10/27/18 0332      86.2 kg (190 lb) 10/27/18 0317 110/54 98.6 °F (37 °C) 72 16 94 %   
10/26/18 2206 120/60 97.9 °F (36.6 °C) 67 18 96 %  Recent Labs 10/27/18 
2130 HGB 9.2* HCT 28.8* * K 4.7 CL 99* CO2 29 BUN 11  
CREA 0.65 GLU 91 Physical Exam: 
Vital Signs are Stable. No Acute Distress. Alert and Oriented. Negative Homans sign. Toes AROM Full. Neurovascular exam is normal.   
Dressing is Clean, Dry, and Intact. Assessment/Plan: 1. Continue oob/rehab 2. Abx per infectious disease 3. D/C planning Continue PT/OT Continue ROM Discharge Plan: Home with Home Health Signed By: CYNTHIA Puga 2018

## 2018-10-27 NOTE — CONSULTS
[facility] Consult Note      Patient: Zelalem Beard MRN: [id number]  SSN: [id number]    YOB: 1935  Age: [de-identified] y.o. Sex: male    Date of Consultation: 10/26/208  Referring Physician: eRggie Lopes MD  Reason for Consultation: Abnormal troponin    Chief complain: S/p knee surgery    HPI: 26-year-old gentleman status post bilateral total knee arthroplasty with I&D, Cardiology consult called for abnormal troponin. Patient denies any chest pain or shortness of breath. He denies any episode of chest pain yesterday. He mentioned  That he has not sure why the EKG was done and he had echocardiogram done. he does not recall the episode of dizziness with perspiration that he mentioned to  Hospital medicine. He has coronary artery disease and is following with cardiologist with Emir Choi.     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    occurred during Cardiac Cath    Nausea & vomiting     Prostate cancer ([facility] [address] 2008    Urgency of urination     Urinary frequency      Past Surgical History:   Procedure Laterality Date    HX CATARACT REMOVAL Right     HX CHOLECYSTECTOMY      HX HEART CATHETERIZATION  12/2017    HX HERNIA REPAIR Right     inguinal    HX KNEE REPLACEMENT Bilateral 2006    HX PROSTATECTOMY       Current Facility-Administered Medications   Medication Dose Route Frequency    HYDROmorphone (DILAUDID) tablet 2 mg  2 mg Oral Q4H PRN    meclizine (ANTIVERT) tablet 25 mg  25 mg Oral Q6H PRN    heparin (porcine) 100 unit/mL injection        heparinized saline 2 units/mL 1,000 unit/500 mL infusion        lidocaine (PF) (XYLOCAINE) 10 mg/mL (1 %) injection        [START ON 10/27/2018] Vancomycin Trough Due  1 Each Other ONCE    heparin (porcine) pf 300-500 Units  300-500 Units InterCATHeter PRN    mirabegron ER (MYRBETRIQ) tablet 50 mg  50 mg Oral DAILY    lactated Ringers infusion  125 mL/hr IntraVENous CONTINUOUS    apixaban (ELIQUIS) tablet 5 mg  5 mg Oral BID    atorvastatin (LIPITOR) tablet 40 mg  40 mg Oral QHS    clopidogrel (PLAVIX) tablet 75 mg  75 mg Oral DAILY    losartan (COZAAR) tablet 25 mg  25 mg Oral DAILY    omeprazole (PRILOSEC) capsule 20 mg  20 mg Oral DAILY    sodium chloride (NS) flush 5-10 mL  5-10 mL IntraVENous Q8H    sodium chloride (NS) flush 5-10 mL  5-10 mL IntraVENous PRN    naloxone (NARCAN) injection 0.4 mg  0.4 mg IntraVENous PRN    diphenhydrAMINE (BENADRYL) injection 12.5 mg  12.5 mg IntraVENous Q4H PRN    ondansetron (ZOFRAN) injection 4 mg  4 mg IntraVENous Q4H PRN    docusate sodium (COLACE) capsule 100 mg  100 mg Oral BID    vancomycin (VANCOCIN) 1250 mg in  ml infusion  1,250 mg IntraVENous Q12H    Vancomycin - Pharmacokinetic Dosing  1 Each Other Rx Dosing/Monitoring    ceFAZolin (ANCEF) 2 g/20 mL in sterile water IV syringe  2 g IntraVENous Q8H    lactated Ringers infusion  125 mL/hr IntraVENous CONTINUOUS    influenza vaccine 2018-19 (6 mos+)(PF) (FLUARIX QUAD/FLULAVAL QUAD) injection 0.5 mL  0.5 mL IntraMUSCular PRIOR TO DISCHARGE       Allergies and Intolerances:   No Known Allergies    Family History:   History reviewed. No pertinent family history. Social History:   He  reports that he quit smoking about 47 years ago. His smoking use included cigarettes. he has never used smokeless tobacco.  He  reports that he does not drink alcohol. Review of Systems:     Gen: No fever, chills, malaise, weight loss/gain. Heent: No headache, rhinorrhea, epistaxis, ear pain, hearing loss, sinus pain, neck pain/stiffness, sore throat. Heart: No chest pain, palpitations, shortness of breath on exertion, pnd, or orthopnea. Resp: No cough, hemoptysis, wheezing and dyspnea. GI: No nausea, vomiting, diarrhea, constipation, melena or hematochezia. : No urinary obstruction, dysuria or hematuria.    Derm: No rash, new skin lesion or pruritis. Musc/skeletal: positive bone or joint complains. Vasc: No edema, cyanosis or claudication. Endo: No heat/cold intolerance, no polyuria,polydipsia or polyphagia. Neuro: No unilateral weakness, numbness, tingling. No seizures. Heme: No easy bruising or bleeding. Physical:   Patient Vitals for the past 6 hrs:   Temp Pulse Resp BP SpO2   10/26/18 2206 97.9 °F (36.6 °C) 67 18 120/60 96 %   10/26/18 1954 97.9 °F (36.6 °C) 68 18 123/67 97 %         Exam:   General Appearance: Comfortable, not using accessory muscles of respiration. HEENT: YUVAL. HEAD: Atraumatic  NECK: No JVD, no thyroidomeglay. CAROTIDS: no bruit  LUNGS: Clear bilaterally. HEART: S1+S2 audible, no murmur, no pericardial rub. ABD: Non-tender, BS Audible    EXT: No edema, and no cysnosis. VASCULAR EXAM: Pulses are intact. PSYCHIATRIC EXAM: Mood is appropriate.   NEUROLOGICAL: AAO times 3, Motor and sensory sytem intact    Review of Data:   LABS:   Lab Results   Component Value Date/Time    WBC 8.2 10/26/2018 04:42 AM    HGB 9.4 (L) 10/26/2018 04:42 AM    HCT 29.5 (L) 10/26/2018 04:42 AM    PLATELET 202 60/13/3904 04:42 AM     Lab Results   Component Value Date/Time    Sodium 138 10/26/2018 04:42 AM    Potassium 4.4 10/26/2018 04:42 AM    Chloride 99 (L) 10/26/2018 04:42 AM    CO2 26 10/26/2018 04:42 AM    Glucose 100 (H) 10/26/2018 04:42 AM    BUN 10 10/26/2018 04:42 AM    Creatinine 0.78 10/26/2018 04:42 AM     No results found for: CHOL, CHOLX, CHLST, CHOLV, HDL, LDL, LDLC, DLDLP, TGLX, TRIGL, TRIGP  No results found for: GPT  Lab Results   Component Value Date/Time    Hemoglobin A1c 6.1 (H) 10/19/2018 09:25 AM         Cardiology Procedures:   Results for orders placed or performed during the hospital encounter of 10/24/18   EKG, 12 LEAD, INITIAL   Result Value Ref Range    Ventricular Rate 67 BPM    Atrial Rate 67 BPM    P-R Interval 240 ms    QRS Duration 104 ms    Q-T Interval 422 ms QTC Calculation (Bezet) 445 ms    Calculated P Axis 36 degrees    Calculated R Axis 41 degrees    Calculated T Axis 47 degrees    Diagnosis       Sinus rhythm with 1st degree AV block  Inferior infarct , age undetermined  Anterolateral infarct , age undetermined  Abnormal ECG    Confirmed by Elijah Dang MD, Claudia Vance ([id number]) on 10/25/2018 4:55:02 PM             Impression / Plan:    Patient Active Problem List   Diagnosis Code    Prostate cancer ([facility] [address] C61    Coronary artery disease involving native coronary artery I25.10    Stented coronary artery Z95.5         CAP (community acquired pneumonia) J18.9    DI (detrusor instability) N32.81    Hematuria R31.9    Hypercholesteremia E78.00    Hypertension I10    Impotence of organic origin N52.9         Pseudoaneurysm following procedure ([facility] [address] I97.89, I72.9    Urgency of urination R39.15    Urinary frequency R35.0    Joint prosthesis infection or inflammation, subsequent encounter T84.50XD     Abnormal troponin- most likely due to demand supply mismatch no clear evidence of acute coronary syndrome. Chronic systolic heart failure LVEF 41-45% with wall motion abnormality  Paroxysmal atrial fibrillation    Patient had LEFT heart catheterization and coronary angiogram done in February 2018 reported   LEFT MAIN: The vessel is a large, heavily calcified vessel with 30-40% ostial stenosis. LEFT ANTERIOR DESCENDING:  The vessel is a large vessel with   patent stents in its proximal and mid segments; the distal vessel   has mild diffuse luminal irregularities.  Diagonal 1 is a small   vessel with 90% proximal/ostial stenosis (jailed by previously   placed stents). LEFT CIRCUMFLEX: The vessel is a large vessel with 80% mid vessel   stenosis just proximal to a large aneurysm; it gives rise only to   a small (<2mm) bifurcation OM1 branch which has severe diffuse   disease (70-90%).   RIGHT CORONARY ARTERY:  The vessel is a large vessel that is   heavily calcified; there is mild to moderate diffuse disease; the   mid vessel has sequential 60-70% lesions; the distal RCA has 80%   stenosis at the PDA/PL bifurcation.  The R PDA is a small vessel   that is totally occluded proximally.  The R posterolateral system   is small in caliber with diffuse 70-90% stenosis. Previous cardioversion is not reviewed. Patient was on amiodarone but it was discontinued due to bradycardia    He has NSTEMI in 01/2018 and cath reported   Cath 1/12/18 (Devyn) - mild LM disease with heavily calcified vessels, proximal and mid LAD stents widely patent with late filling of diag from collaterals that supply distal anterolateral wall, poor filling of apical LAD possibly due to diffuse disease or small vessel occlusion -- no options for revascularization in LAD distribution,   H/o PCI 12/15/17 Walt Olea at [facility]) with PCI to proximal LAD and Staged PCI 1/10/18 (Walt Olea) - PCI to mid LAD using a 3.0 x 22mm Resolute Integrity stent; post stent deploment showed distal dissection s/p 2.75 x 18mm Resolute Integrity stent with good results, loss of small diag distally. Continue Plavix, losartan and statin. Start Metoprolol succinate 25 mg by mouth once a day. Continue Eliquis  Continue management as per Orthopedic surgeon. Medical management  Add nitrates if blood pressure permits. Plan discussed with patient. 35 minutes of critical care time spent in the direct evaluation and treatment of this high risk patient. The reason for providing this level of medical care for this critically ill patient was due a critical illness that impaired one or more vital organ systems such that there was a high probability of imminent or life threatening deterioration in the patients condition.  This care involved high complexity decision making to assess, manipulate, and support vital system functions, to treat this degree vital organ system failure and to prevent further life threatening deterioration of the patients condition.     Signed By: Golden Lawson MD     October 26, 2018

## 2018-10-27 NOTE — PROGRESS NOTES
Cardiology Progress Note 
 
 
10/27/2018 1:09 PM 
 
Admit Date: 10/24/2018 Admit Diagnosis: BILATERAL KNEE INFECTION S/P TKR Joint prosthesis infection or inflammation, subsequent encounter Subjective:  
 
Jamarcus Montes denies chest pain, chest pressure/discomfort, dyspnea. Visit Vitals /56 (BP 1 Location: Left arm, BP Patient Position: At rest) Pulse (!) 48 Temp 97.7 °F (36.5 °C) Resp 16 Ht 5' 10\" (1.778 m) Wt 86.2 kg (190 lb) SpO2 98% BMI 27.26 kg/m² Current Facility-Administered Medications Medication Dose Route Frequency  metoprolol succinate (TOPROL-XL) XL tablet 25 mg  25 mg Oral DAILY  acetaminophen (TYLENOL) tablet 500 mg  500 mg Oral Q4H PRN  
 HYDROmorphone (DILAUDID) tablet 2 mg  2 mg Oral Q4H PRN  
 meclizine (ANTIVERT) tablet 25 mg  25 mg Oral Q6H PRN  
 heparin (porcine) pf 300-500 Units  300-500 Units InterCATHeter PRN  mirabegron ER (MYRBETRIQ) tablet 50 mg  50 mg Oral DAILY  lactated Ringers infusion  125 mL/hr IntraVENous CONTINUOUS  
 apixaban (ELIQUIS) tablet 5 mg  5 mg Oral BID  atorvastatin (LIPITOR) tablet 40 mg  40 mg Oral QHS  clopidogrel (PLAVIX) tablet 75 mg  75 mg Oral DAILY  losartan (COZAAR) tablet 25 mg  25 mg Oral DAILY  omeprazole (PRILOSEC) capsule 20 mg  20 mg Oral DAILY  sodium chloride (NS) flush 5-10 mL  5-10 mL IntraVENous Q8H  
 sodium chloride (NS) flush 5-10 mL  5-10 mL IntraVENous PRN  
 naloxone (NARCAN) injection 0.4 mg  0.4 mg IntraVENous PRN  
 diphenhydrAMINE (BENADRYL) injection 12.5 mg  12.5 mg IntraVENous Q4H PRN  
 ondansetron (ZOFRAN) injection 4 mg  4 mg IntraVENous Q4H PRN  
 docusate sodium (COLACE) capsule 100 mg  100 mg Oral BID  vancomycin (VANCOCIN) 1250 mg in  ml infusion  1,250 mg IntraVENous Q12H  Vancomycin - Pharmacokinetic Dosing  1 Each Other Rx Dosing/Monitoring  ceFAZolin (ANCEF) 2 g/20 mL in sterile water IV syringe  2 g IntraVENous Q8H  
  lactated Ringers infusion  125 mL/hr IntraVENous CONTINUOUS  
 influenza vaccine 2018-19 (6 mos+)(PF) (FLUARIX QUAD/FLULAVAL QUAD) injection 0.5 mL  0.5 mL IntraMUSCular PRIOR TO DISCHARGE Objective:  
  
Physical Exam: 
Visit Vitals /56 (BP 1 Location: Left arm, BP Patient Position: At rest) Pulse (!) 48 Temp 97.7 °F (36.5 °C) Resp 16 Ht 5' 10\" (1.778 m) Wt 86.2 kg (190 lb) SpO2 98% BMI 27.26 kg/m² General Appearance:  Well developed, well nourished,alert and oriented x 3, and individual in no acute distress. Ears/Nose/Mouth/Throat:   Hearing grossly normal. 
  
    Neck: Supple. Chest:   Lungs clear to auscultation bilaterally. Cardiovascular:  Regular rate and rhythm, S1, S2 normal, no murmur. Abdomen:   Soft, non-tender, bowel sounds are active. Extremities: No edema bilaterally. Skin: Warm and dry. Data Review:  
Labs:   
Recent Results (from the past 24 hour(s)) METABOLIC PANEL, BASIC Collection Time: 10/27/18  4:38 AM  
Result Value Ref Range Sodium 135 (L) 136 - 145 mmol/L Potassium 4.7 3.5 - 5.5 mmol/L Chloride 99 (L) 100 - 108 mmol/L  
 CO2 29 21 - 32 mmol/L Anion gap 7 3.0 - 18 mmol/L Glucose 91 74 - 99 mg/dL BUN 11 7.0 - 18 MG/DL Creatinine 0.65 0.6 - 1.3 MG/DL  
 BUN/Creatinine ratio 17 GFR est AA >60 >60 ml/min/1.73m2 GFR est non-AA >60 >60 ml/min/1.73m2 Calcium 11.8 (H) 8.5 - 10.1 MG/DL  
CBC W/O DIFF Collection Time: 10/27/18  4:38 AM  
Result Value Ref Range WBC 8.9 4.6 - 13.2 K/uL  
 RBC 3.38 (L) 4.70 - 5.50 M/uL HGB 9.2 (L) 13.0 - 16.0 g/dL HCT 28.8 (L) 36.0 - 48.0 % MCV 85.2 74.0 - 97.0 FL  
 MCH 27.2 24.0 - 34.0 PG  
 MCHC 31.9 31.0 - 37.0 g/dL  
 RDW 15.7 (H) 11.6 - 14.5 % PLATELET 983 673 - 010 K/uL MPV 10.6 9.2 - 11.8 FL  
VANCOMYCIN, TROUGH Collection Time: 10/27/18  4:38 AM  
Result Value Ref Range Vancomycin,trough 22.4 (HH) 10.0 - 20.0 ug/mL Reported dose date: 20,181,026 Reported dose time: 1,700 Reported dose: VANCOMYCIN 1250 MG UNITS Telemetry: normal sinus rhythm Assessment:  
 
Principal Problem: 
  Joint prosthesis infection or inflammation, subsequent encounter (10/24/2018) Active Problems: 
  Coronary artery disease involving native coronary artery (12/13/2017) Acute combined systolic and diastolic congestive heart failure ([facility] [address] (1/16/2018) Overview: Overview: POA due Ischemic Cardiomyopathy Hypertension (1/12/2018) Plan: CHF is controlled. Patient is being discharged.   He will follow up with his cardiologist. 
 
Jason Coto MD

## 2018-10-27 NOTE — PROGRESS NOTES
Hospitalist Progress Note Patient: Zelalem Beard MRN: [id number]  CSN: [id number] YOB: 1935  Age: [de-identified] y.o. Sex: male DOA: 10/24/2018 LOS:  LOS: 3 days Assessment and Plan: 
 
Principal Problem: 
  Joint prosthesis infection or inflammation, subsequent encounter (10/24/2018) Active Problems: 
  Coronary artery disease involving native coronary artery (12/13/2017) Acute combined systolic and diastolic congestive heart failure ([address] (1/16/2018) Overview: Overview: POA due Ischemic Cardiomyopathy Hypertension (1/12/2018) Medically speaking he has been clear to go home. If he can be made comfortable and safe it should be ok. I did discuss SNF as well. Dizziness is chronic and he has meclizine at home PAF: on ELiquis Follow up with outpatient cardiologist   
 
Chief complaint:  Knee surgery Subjective: 
 
Feels ok overall Review of systems: 
 
General: No fevers or chills. Cardiovascular: No chest pain or pressure. No palpitations. Pulmonary: No shortness of breath. Gastrointestinal: No nausea, vomiting. Objective: 
 
Vital signs/Intake and Output: 
Visit Vitals /64 (BP 1 Location: Left arm, BP Patient Position: At rest) Pulse 66 Temp 98 °F (36.7 °C) Resp 16 Ht 5' 10\" (1.778 m) Wt 86.2 kg (190 lb) SpO2 96% BMI 27.26 kg/m² Current Shift:  10/27 0701 - 10/27 1900 In: -  
Out: 700 [Urine:700] Last three shifts:  10/25 1901 - 10/27 0700 In: 1995 [P.O.:960; I.V.:1035] Out: 3725 [IHVRV:6086] Physical Exam: 
General: NAD, AAOx3. Non-toxic. HEENT: NC/AT. PERRLA, EOMI.  MMM. Lungs: Nml inspection. CTA B/L. No wheezing, rales or rhonchi. Heart:  S1S2 RRR,  PMI mid 5th IC space. No M/RG. Abdomen: Soft, NT/ND.  BS+. No peritoneal signs. Extremities: No C/C/E. Psych:   Nml affect. Neurologic:  2-12 intact. Strength 5/5 throughout. Sensation symmetrical. 
 
 
 
 
Labs: Results: Chemistry Recent Labs 10/27/18 
3290 10/26/18 
2582 10/25/18 
1400 GLU 91 100* 129* * 138 137  
K 4.7 4.4 4.7 CL 99* 99* 101 CO2 29 26 23 BUN 11 10 15 CREA 0.65 0.78 0.86  
CA 11.8* 11.8* 10.0 AGAP 7 13 13 BUCR 17 13 17 CBC w/Diff Recent Labs 10/27/18 
3952 10/26/18 
4115 10/25/18 
3872 WBC 8.9 8.2 11.8  
RBC 3.38* 3.44* 3.53* HGB 9.2* 9.4* 9.6* HCT 28.8* 29.5* 30.1*  
 373 347 Cardiac Enzymes Recent Labs 10/26/18 
0442 10/25/18 
1940 CPK 69 94 CKND1 3.3 3.2 Coagulation No results for input(s): PTP, INR, APTT in the last 72 hours. No lab exists for component: INREXT, INREXT Lipid Panel No results found for: CHOL, CHOLPOCT, CHOLX, CHLST, CHOLV, 774263, HDL, LDL, LDLC, DLDLP, 413816, VLDLC, VLDL, TGLX, TRIGL, TRIGP, TGLPOCT, CHHD, CHHDX  
BNP No results for input(s): BNPP in the last 72 hours. Liver Enzymes No results for input(s): TP, ALB, TBIL, AP, SGOT, GPT in the last 72 hours. No lab exists for component: DBIL Thyroid Studies No results found for: T4, T3U, TSH, TSHEXT, TSHEXT Procedures/imaging: see electronic medical records for all procedures/Xrays and details which were not copied into this note but were reviewed prior to creation of Plan

## 2018-10-27 NOTE — PROGRESS NOTES
Met with patient to review discharge plan, that [facility] nurse will visit him later today to administer vancomycin and will visit on Monday to draw labs. Also discussed previous CM note about ID MD Dr. Ismael Rey seeing him on Wed. 10/31 morning in wound care - he was not very happy with coming back to [facility] and I told him I would contact Dr. Ismael Rey on Monday to see if she still wanted to see him on Monday since [facility] would be seeing him and labs would have been drawn. If she still wanted to see him, he would receive a call on Monday to schedule for Wednesday. Patient appreciative of information.

## 2018-10-29 LAB
BACTERIA SPEC CULT: NORMAL
GRAM STN SPEC: NORMAL
SERVICE CMNT-IMP: NORMAL

## 2018-10-31 ENCOUNTER — HOSPITAL ENCOUNTER (OUTPATIENT)
Dept: WOUND CARE | Age: 83
Discharge: HOME | End: 2018-10-31
Payer: MEDICARE

## 2018-10-31 VITALS
SYSTOLIC BLOOD PRESSURE: 102 MMHG | DIASTOLIC BLOOD PRESSURE: 53 MMHG | HEART RATE: 63 BPM | OXYGEN SATURATION: 100 % | RESPIRATION RATE: 18 BRPM | TEMPERATURE: 97.6 F

## 2018-10-31 PROCEDURE — 99211 OFF/OP EST MAY X REQ PHY/QHP: CPT

## 2018-10-31 NOTE — PROGRESS NOTES
[facility] Infectious Disease Progress Note HPI: 
Mr Nay Rincon is a pleasant [de-identified] yo male with a PMH of b/l TKA from 2006 which has become infected. He was seen in the hospital in consultation and is s/p I&D, with suppuration noted in the OR. His cultures remain negative today. He reports since going home he has not had any fevers/chills. He has been taking Dilaudid for pain but he has been taking Miralax and a stool softener and had a loose BM today. He states that his pain is better than when he was in the hospital but still present. He has been improving with his ROM exercises and is able to walk at times with a walker. ALLERGIES: 
No Known Allergies PHYSICAL EXAM 
Visit Vitals /53 (BP 1 Location: Left arm, BP Patient Position: Sitting) Pulse 63 Temp 97.6 °F (36.4 °C) Resp 18 SpO2 100% GEN: appears stated age, NAD, sitting up in wheelchair PULM: speaking comfortably on RA in full sentences EXT: b/l knee with bandages in place which are the same from the hospital.  No additional strikethrough. No TTP, no surrounding erythema or edema LABS: 
-- 
 
MICRO 
 
WOUND: 
--Negative ASSESSMENT · Bilat knee PJI infection · --culture negative RECOMMENDATIONS 
--f/up with Dr Nena Constantino on 11/21/18 
--f/up with Dr Bryan Diaz tomorrow as scheduled 
--Cont current abx regimen, he is on Vanc/Ancef/Rifampin. Anticipate 6 weeks duration with conversion to oral regimen after that. --Shree labwork: CBC, CMP 
--Check a CRP with the last set of labs 
--Pt to call for any changes clinically --I can't see his labs in our system and there is nothing in the inbox, but he does have [facility] so I will log in to [facility] and see what I can find. If not, will need to contact his home health company to fax them over. --Monitor for worsening symptoms or diarrhea Verdon Gowers, MD 
Infectious Disease

## 2018-11-02 NOTE — PROGRESS NOTES
Tracked down his lab work in the [facility] system WBC is normal and his Cr is <1 Phoned patient to let him know [facility] Infectious Diseases

## 2019-12-26 ENCOUNTER — HOSPITAL ENCOUNTER (OUTPATIENT)
Dept: LAB | Age: 84
Discharge: HOME | End: 2019-12-26

## 2019-12-26 PROCEDURE — 83935 ASSAY OF URINE OSMOLALITY: CPT

## 2019-12-26 PROCEDURE — 36415 COLL VENOUS BLD VENIPUNCTURE: CPT

## 2019-12-27 LAB — OSMOLALITY UR: 352 MOSM/KG H2O (ref 50–1400)

## (undated) DEVICE — PACK PROCEDURE SURG TOT KNEE CUST

## (undated) DEVICE — ROCKER SWITCH PENCIL HOLSTER: Brand: VALLEYLAB

## (undated) DEVICE — HEX-LOCKING BLADE ELECTRODE: Brand: EDGE

## (undated) DEVICE — STERILE POLYISOPRENE POWDER-FREE SURGICAL GLOVES: Brand: PROTEXIS

## (undated) DEVICE — (D)PREP SKN CHLRAPRP APPL 26ML -- CONVERT TO ITEM 371833

## (undated) DEVICE — STERILE POLYISOPRENE POWDER-FREE SURGICAL GLOVES WITH EMOLLIENT COATING: Brand: PROTEXIS

## (undated) DEVICE — KENDALL SCD EXPRESS SLEEVES, KNEE LENGTH, MEDIUM: Brand: KENDALL SCD

## (undated) DEVICE — HANDPIECE SET WITH FAN SPRAY TIP: Brand: INTERPULSE

## (undated) DEVICE — SUT VCRL + 1 36IN CT1 VIO --

## (undated) DEVICE — CONTAINER,SPECIMEN,OR STERILE,4OZ: Brand: MEDLINE

## (undated) DEVICE — 3M™ STERI-DRAPE™ U-DRAPE 1015: Brand: STERI-DRAPE™

## (undated) DEVICE — SOLUTION IRRIG 3000ML LAC R FLX CONT

## (undated) DEVICE — MEDI-VAC NON-CONDUCTIVE SUCTION TUBING: Brand: CARDINAL HEALTH

## (undated) DEVICE — CONCISE CEMENT SCULPS KIT: Brand: CONCISE

## (undated) DEVICE — SWAB CULT LIQ STUART AGR AERB MOD IN BRK SGL RAYON TIP PLAS 220099] BECTON DICKINSON MICRO]

## (undated) DEVICE — UNDERCAST PADDING: Brand: DEROYAL

## (undated) DEVICE — OSCILLATING TIP SAW CARTRIDGE: Brand: PRECISION FALCON

## (undated) DEVICE — REM POLYHESIVE ADULT PATIENT RETURN ELECTRODE: Brand: VALLEYLAB

## (undated) DEVICE — SWAB CULT SGL AMIES W/O CHAR FOR THRT VAG SKIN HRT CULTSWAB

## (undated) DEVICE — GOWN,SIRUS,NONRNF,SETINSLV,2XL,18/CS: Brand: MEDLINE

## (undated) DEVICE — SUT VCRL + 2-0 36IN CT1 UD --

## (undated) DEVICE — SHOWER SPRAY KIT: Brand: PULSAVAC®

## (undated) DEVICE — STERILE TETRA-FLEX CF LF, 6IN X 11 YD: Brand: TETRA-FLEX™ CF

## (undated) DEVICE — NDL PRT INJ NSAF BLNT 18GX1.5 --

## (undated) DEVICE — ZIMMER® STERILE DISPOSABLE TOURNIQUET CUFF WITH PROTECTIVE SLEEVE AND PLC, SINGLE PORT, SINGLE BLADDER, 24 IN. (61 CM)

## (undated) DEVICE — BANDAGE COMPR SELF ADH 5 YDX6 IN TAN STRL PREMIERPRO LF

## (undated) DEVICE — SYR LR LCK 1ML GRAD NSAF 30ML --

## (undated) DEVICE — STRYKER RECIPROCATOR BLADE REPLACEMENT, 12.5 X 76 X 0.9 MM: Brand: CONMED

## (undated) DEVICE — SUTURE MCRYL SZ 3-0 L27IN ABSRB UD L19MM PS-2 3/8 CIR PRIM Y427H